# Patient Record
Sex: FEMALE | Race: WHITE | URBAN - METROPOLITAN AREA
[De-identification: names, ages, dates, MRNs, and addresses within clinical notes are randomized per-mention and may not be internally consistent; named-entity substitution may affect disease eponyms.]

---

## 2018-12-23 ENCOUNTER — WALK IN (OUTPATIENT)
Dept: URGENT CARE | Age: 38
End: 2018-12-23

## 2018-12-23 VITALS — HEART RATE: 76 BPM | DIASTOLIC BLOOD PRESSURE: 72 MMHG | TEMPERATURE: 98.7 F | SYSTOLIC BLOOD PRESSURE: 114 MMHG

## 2018-12-23 DIAGNOSIS — H01.001 BLEPHARITIS OF RIGHT UPPER EYELID, UNSPECIFIED TYPE: Primary | ICD-10-CM

## 2018-12-23 PROCEDURE — 99202 OFFICE O/P NEW SF 15 MIN: CPT | Performed by: NURSE PRACTITIONER

## 2018-12-23 RX ORDER — ALBUTEROL SULFATE 90 UG/1
2 AEROSOL, METERED RESPIRATORY (INHALATION) EVERY 4 HOURS PRN
COMMUNITY

## 2018-12-23 RX ORDER — MONTELUKAST SODIUM 10 MG/1
10 TABLET ORAL NIGHTLY
COMMUNITY

## 2018-12-23 RX ORDER — ALBUTEROL SULFATE 1.25 MG/3ML
1.25 SOLUTION RESPIRATORY (INHALATION) EVERY 6 HOURS PRN
COMMUNITY

## 2018-12-23 RX ORDER — TOBRAMYCIN 3 MG/ML
1 SOLUTION/ DROPS OPHTHALMIC 4 TIMES DAILY
Qty: 5 ML | Refills: 0 | Status: SHIPPED | OUTPATIENT
Start: 2018-12-23 | End: 2018-12-30

## 2018-12-23 RX ORDER — SERTRALINE HYDROCHLORIDE 100 MG/1
100 TABLET, FILM COATED ORAL DAILY
COMMUNITY

## 2018-12-23 ASSESSMENT — ENCOUNTER SYMPTOMS
EYE ITCHING: 1
EYE DISCHARGE: 0
FEVER: 0
PHOTOPHOBIA: 0
EYE REDNESS: 0
CHILLS: 0
EYE PAIN: 0

## 2019-10-05 ENCOUNTER — HOSPITAL (OUTPATIENT)
Dept: OTHER | Age: 39
End: 2019-10-05

## 2021-01-30 ENCOUNTER — IMMUNIZATION (OUTPATIENT)
Dept: LAB | Facility: HOSPITAL | Age: 41
End: 2021-01-30
Attending: EMERGENCY MEDICINE
Payer: COMMERCIAL

## 2021-01-30 DIAGNOSIS — Z23 NEED FOR VACCINATION: Primary | ICD-10-CM

## 2021-01-30 PROCEDURE — 0011A SARSCOV2 VAC 100MCG/0.5ML IM: CPT

## 2021-02-10 ENCOUNTER — LAB ENCOUNTER (OUTPATIENT)
Dept: LAB | Age: 41
End: 2021-02-10
Attending: ALLERGY & IMMUNOLOGY
Payer: COMMERCIAL

## 2021-02-10 ENCOUNTER — NURSE ONLY (OUTPATIENT)
Dept: ALLERGY | Facility: CLINIC | Age: 41
End: 2021-02-10
Payer: COMMERCIAL

## 2021-02-10 ENCOUNTER — OFFICE VISIT (OUTPATIENT)
Dept: ALLERGY | Facility: CLINIC | Age: 41
End: 2021-02-10
Payer: COMMERCIAL

## 2021-02-10 VITALS
OXYGEN SATURATION: 99 % | HEART RATE: 74 BPM | BODY MASS INDEX: 30.31 KG/M2 | WEIGHT: 200 LBS | SYSTOLIC BLOOD PRESSURE: 117 MMHG | HEIGHT: 68 IN | DIASTOLIC BLOOD PRESSURE: 76 MMHG

## 2021-02-10 DIAGNOSIS — J45.40 MODERATE PERSISTENT EXTRINSIC ASTHMA WITHOUT COMPLICATION: Primary | ICD-10-CM

## 2021-02-10 DIAGNOSIS — J30.89 PERENNIAL ALLERGIC RHINITIS WITH SEASONAL VARIATION: ICD-10-CM

## 2021-02-10 DIAGNOSIS — J30.2 PERENNIAL ALLERGIC RHINITIS WITH SEASONAL VARIATION: ICD-10-CM

## 2021-02-10 DIAGNOSIS — J45.40 MODERATE PERSISTENT EXTRINSIC ASTHMA WITHOUT COMPLICATION: ICD-10-CM

## 2021-02-10 LAB
BASOPHILS # BLD AUTO: 0.03 X10(3) UL (ref 0–0.2)
BASOPHILS NFR BLD AUTO: 0.5 %
DEPRECATED RDW RBC AUTO: 40.4 FL (ref 35.1–46.3)
EOSINOPHIL # BLD AUTO: 0.12 X10(3) UL (ref 0–0.7)
EOSINOPHIL NFR BLD AUTO: 2 %
ERYTHROCYTE [DISTWIDTH] IN BLOOD BY AUTOMATED COUNT: 13 % (ref 11–15)
HCT VFR BLD AUTO: 43.8 %
HGB BLD-MCNC: 14.3 G/DL
IMM GRANULOCYTES # BLD AUTO: 0 X10(3) UL (ref 0–1)
IMM GRANULOCYTES NFR BLD: 0 %
LYMPHOCYTES # BLD AUTO: 2.07 X10(3) UL (ref 1–4)
LYMPHOCYTES NFR BLD AUTO: 34.7 %
MCH RBC QN AUTO: 28.1 PG (ref 26–34)
MCHC RBC AUTO-ENTMCNC: 32.6 G/DL (ref 31–37)
MCV RBC AUTO: 86.2 FL
MONOCYTES # BLD AUTO: 0.34 X10(3) UL (ref 0.1–1)
MONOCYTES NFR BLD AUTO: 5.7 %
NEUTROPHILS # BLD AUTO: 3.41 X10 (3) UL (ref 1.5–7.7)
NEUTROPHILS # BLD AUTO: 3.41 X10(3) UL (ref 1.5–7.7)
NEUTROPHILS NFR BLD AUTO: 57.1 %
PLATELET # BLD AUTO: 192 10(3)UL (ref 150–450)
RBC # BLD AUTO: 5.08 X10(6)UL
WBC # BLD AUTO: 6 X10(3) UL (ref 4–11)

## 2021-02-10 PROCEDURE — 3008F BODY MASS INDEX DOCD: CPT | Performed by: ALLERGY & IMMUNOLOGY

## 2021-02-10 PROCEDURE — 95024 IQ TESTS W/ALLERGENIC XTRCS: CPT | Performed by: ALLERGY & IMMUNOLOGY

## 2021-02-10 PROCEDURE — 36415 COLL VENOUS BLD VENIPUNCTURE: CPT

## 2021-02-10 PROCEDURE — 85025 COMPLETE CBC W/AUTO DIFF WBC: CPT

## 2021-02-10 PROCEDURE — 99204 OFFICE O/P NEW MOD 45 MIN: CPT | Performed by: ALLERGY & IMMUNOLOGY

## 2021-02-10 PROCEDURE — 95004 PERQ TESTS W/ALRGNC XTRCS: CPT | Performed by: ALLERGY & IMMUNOLOGY

## 2021-02-10 PROCEDURE — 82785 ASSAY OF IGE: CPT

## 2021-02-10 PROCEDURE — 3078F DIAST BP <80 MM HG: CPT | Performed by: ALLERGY & IMMUNOLOGY

## 2021-02-10 PROCEDURE — 3074F SYST BP LT 130 MM HG: CPT | Performed by: ALLERGY & IMMUNOLOGY

## 2021-02-10 RX ORDER — AMITRIPTYLINE HYDROCHLORIDE 50 MG/1
TABLET, FILM COATED ORAL
COMMUNITY
End: 2021-04-15

## 2021-02-10 RX ORDER — METHYLPREDNISOLONE 4 MG/1
TABLET ORAL
COMMUNITY
Start: 2020-10-09 | End: 2021-04-01

## 2021-02-10 RX ORDER — SUMATRIPTAN 50 MG/1
TABLET, FILM COATED ORAL
COMMUNITY
Start: 2018-12-05 | End: 2021-09-08

## 2021-02-10 RX ORDER — SERTRALINE HYDROCHLORIDE 100 MG/1
100 TABLET, FILM COATED ORAL
COMMUNITY
Start: 2018-04-23 | End: 2021-03-29

## 2021-02-10 RX ORDER — FLUTICASONE FUROATE AND VILANTEROL TRIFENATATE 100; 25 UG/1; UG/1
POWDER RESPIRATORY (INHALATION) DAILY
COMMUNITY
End: 2021-02-10

## 2021-02-10 RX ORDER — CETIRIZINE HCL/PSEUDOEPHEDRINE 5 MG-120MG
TABLET, EXTENDED RELEASE 12 HR ORAL
COMMUNITY
Start: 2020-11-17

## 2021-02-10 RX ORDER — MONTELUKAST SODIUM 10 MG/1
10 TABLET ORAL
COMMUNITY
Start: 2018-03-17 | End: 2021-02-10

## 2021-02-10 RX ORDER — PREDNISONE 20 MG/1
TABLET ORAL
COMMUNITY
Start: 2020-10-05 | End: 2021-04-01

## 2021-02-10 RX ORDER — MULTIVITAMIN WITH FOLIC ACID 400 MCG
TABLET ORAL
COMMUNITY

## 2021-02-10 RX ORDER — LORATADINE 10 MG/1
TABLET ORAL
COMMUNITY
End: 2021-10-04 | Stop reason: ALTCHOICE

## 2021-02-10 RX ORDER — BUDESONIDE 0.5 MG/2ML
INHALANT ORAL
COMMUNITY
Start: 2020-11-19 | End: 2022-01-28 | Stop reason: ALTCHOICE

## 2021-02-10 RX ORDER — TIOTROPIUM BROMIDE INHALATION SPRAY 3.12 UG/1
2 SPRAY, METERED RESPIRATORY (INHALATION) DAILY
Qty: 1 INHALER | Refills: 5 | Status: SHIPPED | OUTPATIENT
Start: 2021-02-10 | End: 2021-09-09

## 2021-02-10 RX ORDER — PREDNISONE 10 MG/1
TABLET ORAL
COMMUNITY
Start: 2020-10-22 | End: 2021-04-01

## 2021-02-10 RX ORDER — ALBUTEROL SULFATE 2.5 MG/3ML
SOLUTION RESPIRATORY (INHALATION)
COMMUNITY
Start: 2020-11-29

## 2021-02-10 RX ORDER — ALBUTEROL SULFATE 90 UG/1
2 AEROSOL, METERED RESPIRATORY (INHALATION)
COMMUNITY
Start: 2018-03-04 | End: 2022-01-28

## 2021-02-10 RX ORDER — MONTELUKAST SODIUM 10 MG/1
10 TABLET ORAL NIGHTLY
Qty: 30 TABLET | Refills: 5 | Status: SHIPPED | OUTPATIENT
Start: 2021-02-10 | End: 2021-10-04 | Stop reason: ALTCHOICE

## 2021-02-10 RX ORDER — AZITHROMYCIN 250 MG/1
TABLET, FILM COATED ORAL
COMMUNITY
Start: 2020-10-22 | End: 2021-04-01

## 2021-02-10 NOTE — PATIENT INSTRUCTIONS
1. Asthma  Moderate persistent by history. Denies active or persistent symptoms more than 2 days/week over the past month . 1 prior hospitalization In 2012. No history of intubations.   Flu vaccine and Pneumovax up-to-date  Continue with Alvesco 160 1 puf

## 2021-02-10 NOTE — PROGRESS NOTES
Kalpesh Ledbetter is a 41year old female. HPI:   Patient presents with:   Allergies: patient presents for environmental allergies, possible mold and dust, ragweed, has had allergies for 30 years, has sinuss pressure, itchy watery eyes, nasal congestio Smoking status: Never Smoker      Smokeless tobacco: Never Used    Alcohol use: Yes      Frequency: Monthly or less    Drug use: Never       Medications (Active prior to today's visit):  Current Outpatient Medications   Medication Sig Dispense Refill   • Allergic/Immuno:  See HPI  Cardiovascular:  Negative for irregular heartbeat/palpitations, chest pain, edema  Constitutional:  Negative night sweats,weight loss, irritability and lethargy  Endocrine:  Negative for cold intolerance, polydipsia and polyp was + to rw, weeds, mold, dog     1. Asthma  Moderate persistent by history. Denies active or persistent symptoms more than 2 days/week over the past month . 1 prior hospitalization In 2012. No history of intubations.   Flu vaccine and Pneumovax up-to-roscoe administration of these medications. Teaching, instruction and sample was provided to the patient by myself. Teaching included  a review of potential adverse side effects as well as potential efficacy.   Patient's questions were answered in regards to med

## 2021-02-18 ENCOUNTER — HOSPITAL ENCOUNTER (OUTPATIENT)
Age: 41
Discharge: HOME | End: 2021-02-18
Payer: COMMERCIAL

## 2021-02-18 ENCOUNTER — APPOINTMENT (OUTPATIENT)
Dept: GENERAL RADIOLOGY | Age: 41
End: 2021-02-18
Attending: NURSE PRACTITIONER
Payer: COMMERCIAL

## 2021-02-18 VITALS
RESPIRATION RATE: 18 BRPM | OXYGEN SATURATION: 97 % | HEART RATE: 83 BPM | SYSTOLIC BLOOD PRESSURE: 128 MMHG | DIASTOLIC BLOOD PRESSURE: 72 MMHG | TEMPERATURE: 99 F

## 2021-02-18 DIAGNOSIS — M25.561 ACUTE PAIN OF RIGHT KNEE: Primary | ICD-10-CM

## 2021-02-18 PROCEDURE — 99203 OFFICE O/P NEW LOW 30 MIN: CPT | Performed by: NURSE PRACTITIONER

## 2021-02-18 PROCEDURE — 99213 OFFICE O/P EST LOW 20 MIN: CPT | Performed by: NURSE PRACTITIONER

## 2021-02-18 PROCEDURE — 73560 X-RAY EXAM OF KNEE 1 OR 2: CPT | Performed by: NURSE PRACTITIONER

## 2021-02-18 NOTE — ED INITIAL ASSESSMENT (HPI)
Gradual onset of right knee pain getting worse over past 2 weeks. No known trauma. Pain is worse walking up stairs. Pain located just below patella. + distal CMS.

## 2021-02-18 NOTE — ED PROVIDER NOTES
Patient Seen in: [facility]      History   Patient presents with:  Leg or Foot Injury: Right Knee Pain - Entered by patient  Knee Pain    Stated Complaint: Leg or Foot Injury - Right Knee Pain    HPI/Subjective:   HPI    This is a well-appear external ear normal.      Nose: Nose normal.      Mouth/Throat:      Mouth: Mucous membranes are moist.      Pharynx: Oropharynx is clear. Uvula midline. Eyes:      General: Lids are normal.      Extraocular Movements: Extraocular movements intact. the patient the need for close follow-up. Orthopedics contact information on her discharge paperwork. I have placed her in a knee immobilizer. We discussed supportive care and close follow-up. Strict ER precautions given.   Patient verbalized plan of ca

## 2021-02-24 ENCOUNTER — OFFICE VISIT (OUTPATIENT)
Dept: ORTHOPEDICS CLINIC | Facility: CLINIC | Age: 41
End: 2021-02-24
Payer: COMMERCIAL

## 2021-02-24 ENCOUNTER — HOSPITAL ENCOUNTER (OUTPATIENT)
Dept: GENERAL RADIOLOGY | Facility: HOSPITAL | Age: 41
Discharge: HOME | End: 2021-02-24
Attending: ORTHOPAEDIC SURGERY
Payer: COMMERCIAL

## 2021-02-24 VITALS — HEIGHT: 68.31 IN | BODY MASS INDEX: 29.66 KG/M2 | WEIGHT: 198 LBS

## 2021-02-24 DIAGNOSIS — R52 PAIN: ICD-10-CM

## 2021-02-24 DIAGNOSIS — M22.41 PATELLOFEMORAL CHONDROSIS OF RIGHT KNEE: Primary | ICD-10-CM

## 2021-02-24 PROCEDURE — 3008F BODY MASS INDEX DOCD: CPT | Performed by: ORTHOPAEDIC SURGERY

## 2021-02-24 PROCEDURE — 99204 OFFICE O/P NEW MOD 45 MIN: CPT | Performed by: ORTHOPAEDIC SURGERY

## 2021-02-24 PROCEDURE — 73560 X-RAY EXAM OF KNEE 1 OR 2: CPT | Performed by: ORTHOPAEDIC SURGERY

## 2021-02-24 NOTE — PROGRESS NOTES
NURSING INTAKE COMMENTS: Patient presents with:  Knee Pain: Right - onset a couple of weeks ago - no injury - has pain more with the stairs - rates pain as 3-8/10 on and off - has x-rays in the system - has little swelling, no  numbness or tingling       H MCG/ACT Inhalation Aerosol Inhale into the lungs. • predniSONE 10 MG Oral Tab TK 2 TS PO D FOR 4 DAYS THEN TK 1 T PO D FOR 4 DAYS     • predniSONE 20 MG Oral Tab TK 2 TS PO D FOR 4 DAYS     • Sertraline HCl 100 MG Oral Tab Take 100 mg by mouth.      • S dizziness, memory loss  PSYCHIATRIC: denies Hx of depression, anxiety, other psychiatric disorders  HEMATOLOGIC: denies blood clots, anemia, blood clotting disorders, blood transfusion  ENDOCRINE: denies autoimmune disease, thyroid issues, or diabetes  ALL 2/24/2021 at 4:24 PM     Finalized by (CST): Snuil Napoles MD on 2/24/2021 at 4:25 PM          Xr Knee (1 Or 2 Views), Right (cpt=73560)    Result Date: 2/18/2021  PROCEDURE: XR KNEE (1 OR 2 VIEWS), RIGHT (CPT=73560)  COMPARISON: None.   INDICATIONS: An

## 2021-02-27 ENCOUNTER — IMMUNIZATION (OUTPATIENT)
Dept: LAB | Facility: HOSPITAL | Age: 41
End: 2021-02-27
Attending: PREVENTIVE MEDICINE
Payer: COMMERCIAL

## 2021-02-27 DIAGNOSIS — Z23 NEED FOR VACCINATION: Primary | ICD-10-CM

## 2021-02-27 PROCEDURE — 0012A SARSCOV2 VAC 100MCG/0.5ML IM: CPT

## 2021-03-16 ENCOUNTER — TELEPHONE (OUTPATIENT)
Dept: PHYSICAL THERAPY | Facility: HOSPITAL | Age: 41
End: 2021-03-16

## 2021-03-17 ENCOUNTER — OFFICE VISIT (OUTPATIENT)
Dept: PHYSICAL THERAPY | Facility: HOSPITAL | Age: 41
End: 2021-03-17
Attending: ORTHOPAEDIC SURGERY
Payer: COMMERCIAL

## 2021-03-17 DIAGNOSIS — M22.41 PATELLOFEMORAL CHONDROSIS OF RIGHT KNEE: ICD-10-CM

## 2021-03-17 PROCEDURE — 97530 THERAPEUTIC ACTIVITIES: CPT

## 2021-03-17 PROCEDURE — 97110 THERAPEUTIC EXERCISES: CPT

## 2021-03-17 PROCEDURE — 97161 PT EVAL LOW COMPLEX 20 MIN: CPT

## 2021-03-17 NOTE — PROGRESS NOTES
LOWER EXTREMITY EVALUATION:   Referring Physician: Dr. Jami Baker  Diagnosis: R knee pain, L posterolateral thigh pain, LBP     Date of Service: 3/17/2021     PATIENT SUMMARY   Lars Schroeder is a 41year old female who presents to therapy today with repetitively, worse at end of     Exercise/Hobbies/Recreation: chasing around 4yo      Sleep screen: slow adjustment, used to work overnights, getting 6-8 hours all in one.       Past Medical History:   Diagnosis Date   • Allergic rhinitis    • Asthma Furo-Formoterol Fum 100-5 MCG/ACT Inhalation Aerosol, Inhale into the lungs. , Disp: , Rfl:   predniSONE 10 MG Oral Tab, TK 2 TS PO D FOR 4 DAYS THEN TK 1 T PO D FOR 4 DAYS, Disp: , Rfl:   predniSONE 20 MG Oral Tab, TK 2 TS PO D FOR 4 DAYS, Disp: , Rfl:   S impairments and reach functional goals. Precautions:  None  OBJECTIVE:     Palpation: L lateral TFJ space, L GT bursae, ITB, gerdy.   R all WFL    LROM  TBD    Accessory Lumbar T12-S1 progressively more groin pain R as PT goes lower  AROM: (* denotes pe provided recommendations for activity modifications, possible soreness after evaluation, modalities as needed [ice/heat], postural corrections, ergonomics, pain science education , detrimental fear avoidance behaviors and importance of remaining active.   P be seen for 1-2 x/week or a total of 8 visits over a 90 day period.  Treatment will include: Gait training, Manual Therapy, Mechanical Traction, Neuromuscular Re-education, Self-Care Home Management, Therapeutic Activities, Therapeutic Exercise and Home Exe

## 2021-03-23 ENCOUNTER — OFFICE VISIT (OUTPATIENT)
Dept: PHYSICAL THERAPY | Facility: HOSPITAL | Age: 41
End: 2021-03-23
Attending: ORTHOPAEDIC SURGERY
Payer: COMMERCIAL

## 2021-03-23 PROCEDURE — 97112 NEUROMUSCULAR REEDUCATION: CPT

## 2021-03-23 PROCEDURE — 97110 THERAPEUTIC EXERCISES: CPT

## 2021-03-23 PROCEDURE — 97530 THERAPEUTIC ACTIVITIES: CPT

## 2021-03-23 NOTE — PROGRESS NOTES
PHYSICAL THERAPY DAILY TREATMENT NOTE  Phyllis Gramajo  41year old  female  Diagnosis: R knee pain, L posterolateral thigh pain, LBP      Insurance (Authorized # of Visits):      Vinita Melchor 73 Physician: Dr. Jesus Fraser -    Palpation: L lateral TFJ space, L GT bursae, ITB, gerdy.   R all WFL    Accessory Lumbar T12-S1 progressively more groin pain R as PT goes lower   •  •  •  •  •    NV=next visit, AMRAP= as many reps as possible, RPE= rate of perceived exertion, LLLD= l

## 2021-03-29 ENCOUNTER — PATIENT MESSAGE (OUTPATIENT)
Dept: OBGYN CLINIC | Facility: CLINIC | Age: 41
End: 2021-03-29

## 2021-03-29 ENCOUNTER — OFFICE VISIT (OUTPATIENT)
Dept: OBGYN CLINIC | Facility: CLINIC | Age: 41
End: 2021-03-29
Payer: COMMERCIAL

## 2021-03-29 VITALS
WEIGHT: 195.81 LBS | HEIGHT: 68 IN | HEART RATE: 96 BPM | SYSTOLIC BLOOD PRESSURE: 120 MMHG | DIASTOLIC BLOOD PRESSURE: 80 MMHG | BODY MASS INDEX: 29.68 KG/M2

## 2021-03-29 DIAGNOSIS — Z01.419 ENCOUNTER FOR ANNUAL ROUTINE GYNECOLOGICAL EXAMINATION: Primary | ICD-10-CM

## 2021-03-29 DIAGNOSIS — Z12.31 ENCOUNTER FOR SCREENING MAMMOGRAM FOR BREAST CANCER: ICD-10-CM

## 2021-03-29 DIAGNOSIS — Z98.890 HISTORY OF LOOP ELECTRICAL EXCISION PROCEDURE (LEEP): ICD-10-CM

## 2021-03-29 DIAGNOSIS — Z12.4 SCREENING FOR MALIGNANT NEOPLASM OF CERVIX: ICD-10-CM

## 2021-03-29 DIAGNOSIS — Z13.21 ENCOUNTER FOR VITAMIN DEFICIENCY SCREENING: ICD-10-CM

## 2021-03-29 PROBLEM — O14.23 HEMOLYSIS, ELEVATED LIVER ENZYMES, AND LOW PLATELET (HELLP) SYNDROME DURING PREGNANCY IN THIRD TRIMESTER: Status: ACTIVE | Noted: 2021-03-29

## 2021-03-29 PROBLEM — O14.23 HEMOLYSIS, ELEVATED LIVER ENZYMES, AND LOW PLATELET (HELLP) SYNDROME DURING PREGNANCY IN THIRD TRIMESTER (HCC): Status: ACTIVE | Noted: 2021-03-29

## 2021-03-29 PROBLEM — N80.9 ENDOMETRIOSIS: Status: ACTIVE | Noted: 2021-03-29

## 2021-03-29 PROCEDURE — 99386 PREV VISIT NEW AGE 40-64: CPT | Performed by: ADVANCED PRACTICE MIDWIFE

## 2021-03-29 PROCEDURE — 3074F SYST BP LT 130 MM HG: CPT | Performed by: ADVANCED PRACTICE MIDWIFE

## 2021-03-29 PROCEDURE — 3079F DIAST BP 80-89 MM HG: CPT | Performed by: ADVANCED PRACTICE MIDWIFE

## 2021-03-29 PROCEDURE — 3008F BODY MASS INDEX DOCD: CPT | Performed by: ADVANCED PRACTICE MIDWIFE

## 2021-03-29 NOTE — PROGRESS NOTES
HPI/Subjective:   Patient ID: Jessika Decker is a 41year old female. HPI    History/Other:   Review of Systems   Constitutional: Negative. HENT: Negative. Eyes: Negative. Respiratory: Negative. Cardiovascular: Negative.     Gastrointest SUMAtriptan Succinate 50 MG Oral Tab TK 1 T PO BID PRN     • Ciclesonide 160 MCG/ACT Inhalation Aero Soln Inhale 2 puffs into the lungs daily.  1 Inhaler 5   • Tiotropium Bromide Monohydrate (SPIRIVA RESPIMAT) 2.5 MCG/ACT Inhalation Aero Soln Inhale 2 puffs MG Oral Tab daily     • azithromycin 250 MG Oral Tab TK 2 TS PO FOR 1 DAY THEN TK 1 T PO D FOR 4 DAYS     • budesonide 0.5 MG/2ML Inhalation Suspension INHALE 1 VIAL PO VIA NEBULIZER BID     • ZYRTEC-D ALLERGY & CONGESTION 5-120 MG Oral Tablet 12 Hr TK 1 T Tenderness: There is no abdominal tenderness. Genitourinary:     General: Normal vulva. Vagina: Normal.      Adnexa:         Right: No mass, tenderness or fullness. Left: No mass, tenderness or fullness.         Rectum: Normal.      Comments

## 2021-03-30 ENCOUNTER — OFFICE VISIT (OUTPATIENT)
Dept: PHYSICAL THERAPY | Facility: HOSPITAL | Age: 41
End: 2021-03-30
Attending: ORTHOPAEDIC SURGERY
Payer: COMMERCIAL

## 2021-03-30 ENCOUNTER — LAB ENCOUNTER (OUTPATIENT)
Dept: LAB | Facility: HOSPITAL | Age: 41
End: 2021-03-30
Attending: ADVANCED PRACTICE MIDWIFE
Payer: COMMERCIAL

## 2021-03-30 DIAGNOSIS — Z01.419 ENCOUNTER FOR ANNUAL ROUTINE GYNECOLOGICAL EXAMINATION: ICD-10-CM

## 2021-03-30 DIAGNOSIS — Z13.21 ENCOUNTER FOR VITAMIN DEFICIENCY SCREENING: ICD-10-CM

## 2021-03-30 PROCEDURE — 82306 VITAMIN D 25 HYDROXY: CPT

## 2021-03-30 PROCEDURE — 36415 COLL VENOUS BLD VENIPUNCTURE: CPT

## 2021-03-30 PROCEDURE — 84443 ASSAY THYROID STIM HORMONE: CPT

## 2021-03-30 PROCEDURE — 97530 THERAPEUTIC ACTIVITIES: CPT

## 2021-03-30 PROCEDURE — 80053 COMPREHEN METABOLIC PANEL: CPT

## 2021-03-30 PROCEDURE — 97110 THERAPEUTIC EXERCISES: CPT

## 2021-03-30 PROCEDURE — 97112 NEUROMUSCULAR REEDUCATION: CPT

## 2021-03-30 PROCEDURE — 80061 LIPID PANEL: CPT

## 2021-03-30 NOTE — PROGRESS NOTES
PHYSICAL THERAPY DAILY TREATMENT NOTE  Britney Connell  41year old  female  Diagnosis: R knee pain, L posterolateral thigh pain, LBP      Insurance (Authorized # of Visits):      Meera Melchor 73 Physician: Dr. Waldo Curling 4+/5*    Flexibility:  Jorge test - Hip Flexor: R +, L +  Supine 90-90 - Hamstrings: R +; L +  Piriformis: R -; L -  Quads: R +; L +  Gastroc-soleus: R -; L -    Palpation: L lateral TFJ space, L GT bursae, ITB, gerdy.   R all WFL    Accessory Lumbar T12-S

## 2021-04-01 ENCOUNTER — MOBILE ENCOUNTER (OUTPATIENT)
Dept: OBGYN CLINIC | Facility: CLINIC | Age: 41
End: 2021-04-01

## 2021-04-01 ENCOUNTER — OFFICE VISIT (OUTPATIENT)
Dept: INTERNAL MEDICINE CLINIC | Facility: CLINIC | Age: 41
End: 2021-04-01
Payer: COMMERCIAL

## 2021-04-01 ENCOUNTER — HOSPITAL ENCOUNTER (OUTPATIENT)
Dept: MAMMOGRAPHY | Facility: HOSPITAL | Age: 41
Discharge: HOME | End: 2021-04-01
Attending: ADVANCED PRACTICE MIDWIFE
Payer: COMMERCIAL

## 2021-04-01 VITALS
HEIGHT: 68 IN | HEART RATE: 85 BPM | WEIGHT: 197 LBS | DIASTOLIC BLOOD PRESSURE: 74 MMHG | RESPIRATION RATE: 17 BRPM | BODY MASS INDEX: 29.86 KG/M2 | SYSTOLIC BLOOD PRESSURE: 109 MMHG

## 2021-04-01 DIAGNOSIS — Z12.31 ENCOUNTER FOR SCREENING MAMMOGRAM FOR BREAST CANCER: ICD-10-CM

## 2021-04-01 DIAGNOSIS — E55.9 VITAMIN D DEFICIENCY: ICD-10-CM

## 2021-04-01 DIAGNOSIS — Z00.00 PHYSICAL EXAM, ANNUAL: Primary | ICD-10-CM

## 2021-04-01 PROCEDURE — 3008F BODY MASS INDEX DOCD: CPT | Performed by: INTERNAL MEDICINE

## 2021-04-01 PROCEDURE — 77063 BREAST TOMOSYNTHESIS BI: CPT | Performed by: ADVANCED PRACTICE MIDWIFE

## 2021-04-01 PROCEDURE — 3078F DIAST BP <80 MM HG: CPT | Performed by: INTERNAL MEDICINE

## 2021-04-01 PROCEDURE — 77067 SCR MAMMO BI INCL CAD: CPT | Performed by: ADVANCED PRACTICE MIDWIFE

## 2021-04-01 PROCEDURE — 99386 PREV VISIT NEW AGE 40-64: CPT | Performed by: INTERNAL MEDICINE

## 2021-04-01 PROCEDURE — 3074F SYST BP LT 130 MM HG: CPT | Performed by: INTERNAL MEDICINE

## 2021-04-01 RX ORDER — CHOLECALCIFEROL (VITAMIN D3) 1250 MCG
1 CAPSULE ORAL WEEKLY
Qty: 8 CAPSULE | Refills: 0 | Status: SHIPPED | OUTPATIENT
Start: 2021-04-01 | End: 2021-05-31

## 2021-04-01 NOTE — PATIENT INSTRUCTIONS
Prevention Guidelines, Women Ages 36 to 52  Screening tests and vaccines are an important part of managing your health. A screening test is done to find diseases in people who don't have any symptoms.  The goal is to find a disease early so lifestyle brown sigmoidoscopy every 5 years, or  · Colonoscopy every 10 years, or  · CT colonography (virtual colonoscopy) every 5 years, or  · Yearly fecal occult blood test, or  · Yearly fecal immunochemical test every year, or  · Stool DNA test, every 3 years  If you c least 4 weeks after the first dose   Hepatitis A Women at increased risk for infection–talk with your healthcare provider 2 doses given 6 months apart   Hepatitis B Women at increased risk for infection–talk with your healthcare provider 3 doses over 6 mon American Academy of Ophthalmology  Domenico last reviewed this educational content on 11/1/2017  © 9092-9556 [employer]. All rights reserved. This information is not intended as a substitute for professional medical care.  Always follow your

## 2021-04-01 NOTE — PROGRESS NOTES
Funmi Julio is a 41year old female.   Patient presents with:  Physical      HPI:   New pt   C/c establish care   C/o  Physical   Overall doing well , had a hysterectomy for endometriosis and heavy periods etc         PMH  Allergic rhinitis/seasonal Laterality Date   • Appendectomy     •      •  delivery only     • Cholecystectomy  2016   • Hysterectomy  2015   • [facility], ,,    ablation of implants      Social History:  Social History    Tobacc advised     will take vit d otc as per the gyn recs , will monitor     Preventive medicine  Pap smear done 3/20/2021 results pending  Labs done  Mammogram 4/1/2021  Albs from 2/2021 and 3/2021        The patient indicates understanding of these issues and

## 2021-04-02 ENCOUNTER — APPOINTMENT (OUTPATIENT)
Dept: PHYSICAL THERAPY | Facility: HOSPITAL | Age: 41
End: 2021-04-02
Attending: ORTHOPAEDIC SURGERY
Payer: COMMERCIAL

## 2021-04-07 ENCOUNTER — OFFICE VISIT (OUTPATIENT)
Dept: PHYSICAL THERAPY | Facility: HOSPITAL | Age: 41
End: 2021-04-07
Attending: ORTHOPAEDIC SURGERY
Payer: COMMERCIAL

## 2021-04-07 PROCEDURE — 97140 MANUAL THERAPY 1/> REGIONS: CPT

## 2021-04-07 PROCEDURE — 97112 NEUROMUSCULAR REEDUCATION: CPT

## 2021-04-07 PROCEDURE — 97110 THERAPEUTIC EXERCISES: CPT

## 2021-04-07 NOTE — PROGRESS NOTES
PHYSICAL THERAPY DAILY TREATMENT NOTE  Donna Seen  41year old  female  Diagnosis: R knee pain, L posterolateral thigh pain, LBP      Insurance (Authorized # of Visits):      Alem Melchor 73 Physician: Dr. Narda Yanez sitting, bending/stooping over, lifting.   •  •  •  •  •    Objective comparable signs • decr squat  • MMT Hip F 4+ R*, Ext 4+ B, R knee ext 4+/5*    Flexibility:  Jorge test - Hip Flexor: R +, L +  Supine 90-90 - Hamstrings: R +; L +  Piriformis: R -; L - Total Timed Treatment: 45 min  Total Treatment Time: 45 min

## 2021-04-09 ENCOUNTER — APPOINTMENT (OUTPATIENT)
Dept: PHYSICAL THERAPY | Facility: HOSPITAL | Age: 41
End: 2021-04-09
Attending: ORTHOPAEDIC SURGERY
Payer: COMMERCIAL

## 2021-04-14 ENCOUNTER — OFFICE VISIT (OUTPATIENT)
Dept: PHYSICAL THERAPY | Facility: HOSPITAL | Age: 41
End: 2021-04-14
Attending: ORTHOPAEDIC SURGERY
Payer: COMMERCIAL

## 2021-04-14 ENCOUNTER — OFFICE VISIT (OUTPATIENT)
Dept: PODIATRY CLINIC | Facility: CLINIC | Age: 41
End: 2021-04-14
Payer: COMMERCIAL

## 2021-04-14 DIAGNOSIS — B35.1 ONYCHOMYCOSIS: Primary | ICD-10-CM

## 2021-04-14 PROCEDURE — 97110 THERAPEUTIC EXERCISES: CPT

## 2021-04-14 PROCEDURE — 99202 OFFICE O/P NEW SF 15 MIN: CPT | Performed by: PODIATRIST

## 2021-04-14 PROCEDURE — 97530 THERAPEUTIC ACTIVITIES: CPT

## 2021-04-14 NOTE — PROGRESS NOTES
HPI:    Patient ID: Donna Seen is a 41year old female. This pleasant 43-year-old female presents as a new patient to me and is self-referred. 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   The reason she is here is because of the nails par Aero Soln Inhale 2 puffs into the lungs daily. 1 Inhaler 5   • Tiotropium Bromide Monohydrate (SPIRIVA RESPIMAT) 2.5 MCG/ACT Inhalation Aero Soln Inhale 2 puffs into the lungs daily.  1 Inhaler 5   • Montelukast Sodium 10 MG Oral Tab Take 1 tablet (10 mg to

## 2021-04-14 NOTE — PROGRESS NOTES
PHYSICAL THERAPY DAILY TREATMENT NOTE  Jocy Moncada  41year old  female  Diagnosis: R knee pain, L posterolateral thigh pain, LBP      Insurance (Authorized # of Visits):      Kate Melchor 73 Physician: Dr. Nikki Rogers 2x8 ea • SL skater squat rtb 3x10-15 ea • Retro tap gtb 2x10 ea 6\"  Hip abd facil  • Lunge 2x10 static gtb hip abd facil ea •  •    Subjective Comparable signs stair negotiation, Sit <>stand, working (lots of repetitive sit <> stand), prolonged sitting, b over position  All: laying       PLAN OF CARE:    Short Term Goals: (to be met in 86 visits)  · Pt will be independent and compliant with comprehensive HEP to maintain progress achieved in PT   · Pt will improve quad strength to 5/5 to ascend 1 flight of s

## 2021-04-15 ENCOUNTER — OFFICE VISIT (OUTPATIENT)
Dept: ORTHOPEDICS CLINIC | Facility: CLINIC | Age: 41
End: 2021-04-15
Payer: COMMERCIAL

## 2021-04-15 VITALS — WEIGHT: 198 LBS | BODY MASS INDEX: 30.01 KG/M2 | HEIGHT: 68 IN

## 2021-04-15 DIAGNOSIS — M22.41 PATELLOFEMORAL CHONDROSIS OF RIGHT KNEE: Primary | ICD-10-CM

## 2021-04-15 DIAGNOSIS — Z78.9 KNOWN HEALTH PROBLEMS: NONE: Primary | ICD-10-CM

## 2021-04-15 PROCEDURE — 3008F BODY MASS INDEX DOCD: CPT | Performed by: ORTHOPAEDIC SURGERY

## 2021-04-15 PROCEDURE — 99213 OFFICE O/P EST LOW 20 MIN: CPT | Performed by: ORTHOPAEDIC SURGERY

## 2021-04-15 NOTE — PROGRESS NOTES
NURSING INTAKE COMMENTS: Patient presents with:  Knee Pain: right knee, a little pinchy, not in any pain. Pain worsens as day goes on. PT did help. HPI: This 41year old female presents today with complaints of right knee follow-up.   She has patellof lungs.     • SUMAtriptan Succinate 50 MG Oral Tab TK 1 T PO BID PRN     • Ciclesonide 160 MCG/ACT Inhalation Aero Soln Inhale 2 puffs into the lungs daily.  1 Inhaler 5   • Tiotropium Bromide Monohydrate (SPIRIVA RESPIMAT) 2.5 MCG/ACT Inhalation Aero Soln I denies numbness, tingling, weakness, balance issues, dizziness, memory loss  PSYCHIATRIC: denies Hx of depression, anxiety, other psychiatric disorders  HEMATOLOGIC: denies blood clots, anemia, blood clotting disorders, blood transfusion  ENDOCRINE: denies for breast cancer  TECHNIQUE:  Full field direct screening mammography was performed and images were reviewed with the R2 RAI [de-identified] CAD device.   3D tomosynthesis was performed and reviewed   BREAST COMPOSITION:   Category c-Heterogeneously dense, which m exercises for isometric strengthening of the VMO. Gradually increase activity. Follow-up on an as-needed basis. All of her questions were asked and answered. Follow Up: No follow-ups on file.     BRITTANEY Stubbs Dr. was present for th

## 2021-05-12 ENCOUNTER — APPOINTMENT (OUTPATIENT)
Dept: PHYSICAL THERAPY | Facility: HOSPITAL | Age: 41
End: 2021-05-12
Attending: ORTHOPAEDIC SURGERY
Payer: COMMERCIAL

## 2021-05-18 ENCOUNTER — OFFICE VISIT (OUTPATIENT)
Dept: PODIATRY CLINIC | Facility: CLINIC | Age: 41
End: 2021-05-18
Payer: COMMERCIAL

## 2021-05-18 DIAGNOSIS — B35.1 DERMATOPHYTOSIS OF NAIL: Primary | ICD-10-CM

## 2021-05-18 PROCEDURE — 99212 OFFICE O/P EST SF 10 MIN: CPT | Performed by: PODIATRIST

## 2021-05-18 NOTE — PROGRESS NOTES
HPI:    Patient ID: Emperatriz Pederson is a 41year old female. This 15-year-old female presents for nail culture. The indication is that she has discontinued all local antifungal treatments for the past month.   After discussion and review of options FACE FLUSHING, HIVES, RASH,                            SWELLING   PHYSICAL EXAM:              ASSESSMENT/PLAN:   Dermatophytosis of nail  (primary encounter diagnosis)    Orders Placed This Encounter      Dermatophyte Only, Culture [E]      Meds This [employer]

## 2021-05-26 ENCOUNTER — OFFICE VISIT (OUTPATIENT)
Dept: PHYSICAL THERAPY | Facility: HOSPITAL | Age: 41
End: 2021-05-26
Attending: ORTHOPAEDIC SURGERY
Payer: COMMERCIAL

## 2021-05-26 PROCEDURE — 97112 NEUROMUSCULAR REEDUCATION: CPT

## 2021-05-26 PROCEDURE — 97530 THERAPEUTIC ACTIVITIES: CPT

## 2021-05-26 NOTE — PROGRESS NOTES
PHYSICAL THERAPY DAILY TREATMENT NOTE  Mecca Watts  41year old  female  Diagnosis: R knee pain, L posterolateral thigh pain, LBP      Insurance (Authorized # of Visits):      Sherri Melchor 73 Physician: Dr. Sujey Ng bracing  • 10'   Manual Therapy •  •  •  • TFJ post mob L Gr 3  • Lumbar L L5 Gr 3-4, x1 Gr 5 rotation •  •    Neuromuscular Re-education •  • Squat 3x10 rtb  • Standing hip abd 3x10 ea rtb  • Y balance anterior slide 2x8 ea • SL skater squat rtb 3x10-15 e more activity    L constant posterolateral thigh pain (since she fell in a septic tank) 2018 May  Aggs: depends on how she is sitting (just depends on chair), in the car, pressure into posterior thigh  All: standing     LBP  Agg: bending, lifting, bent ove

## 2021-06-15 ENCOUNTER — PATIENT MESSAGE (OUTPATIENT)
Dept: INTERNAL MEDICINE CLINIC | Facility: CLINIC | Age: 41
End: 2021-06-15

## 2021-06-15 DIAGNOSIS — R10.31 RIGHT INGUINAL PAIN: Primary | ICD-10-CM

## 2021-06-15 NOTE — TELEPHONE ENCOUNTER
Reviewed PT note from [facility], PT from 5/26/21 in chart. Pt reported having right inguinal pain. Please see patient's report below.  Requesting referral for pelvic floor PT. LOV for you 4/1/21 for physical. Per message seems she has history of abdomi

## 2021-06-15 NOTE — TELEPHONE ENCOUNTER
From: Nathen Lomeli  To: America Mayer MD  Sent: 6/15/2021 11:53 AM CDT  Subject: Referral Request    Hi Dr Holger Moseley,     I was wondering if you could write me a referral to see physical therapist Angelo Salcido.  Last month when I seen Philipp Oro for for my 1 mo

## 2021-06-18 ENCOUNTER — TELEPHONE (OUTPATIENT)
Dept: PHYSICAL THERAPY | Facility: HOSPITAL | Age: 41
End: 2021-06-18

## 2021-07-14 ENCOUNTER — TELEPHONE (OUTPATIENT)
Dept: ALLERGY | Facility: CLINIC | Age: 41
End: 2021-07-14

## 2021-07-14 NOTE — TELEPHONE ENCOUNTER
Per cover my meds, no PA required, is covered med. Per [employer] [employer] prescriptions for 90 day supply needs to be filled at pharmacies listed below, not at [place].      Called [facility], [phone], spoke with Arin Duran, inquired which are el

## 2021-07-14 NOTE — TELEPHONE ENCOUNTER
Medication PA Requested:  Tiotropium Bromide Monohydrate (SPIRIVA RESPIMAT) 2.5 MCG/ACT Inhalation Aero Soln, Disp: 1 Inhaler, Rfl: 5                     CoverMyMeds Used:  Key: [id number]  Sig:  Inhale 2 puffs into the lungs daily. ,  DX Code:  Moderate persi

## 2021-07-14 NOTE — TELEPHONE ENCOUNTER
Medication PA requested:  Tiotropium Bromide Monohydrate (SPIRIVA RESPIMAT) 2.5 MCG/ACT Inhalation Aero Soln, Inhale 2 puffs into the lungs daily. , Disp: 1 Inhaler, Rfl: 5    Dx Code:  Moderate persistent extrinsic asthma without complication D56.52      [facility]

## 2021-07-14 NOTE — TELEPHONE ENCOUNTER
•  Tiotropium Bromide Monohydrate (SPIRIVA RESPIMAT) 2.5 MCG/ACT Inhalation Aero Soln, Inhale 2 puffs into the lungs daily. , Disp: 1 Inhaler, Rfl: 5    Key: [id number]

## 2021-07-15 NOTE — TELEPHONE ENCOUNTER
LMTCB on patient's voicemail. When patient returns call please inform her as below and ask what pharmacy that is covered by per insurance plan she would like a prescription sent to for Spiriva Respimat.       Per Angelica, reject states has to be 90day

## 2021-08-05 ENCOUNTER — NURSE ONLY (OUTPATIENT)
Dept: LAB | Age: 41
End: 2021-08-05
Attending: PREVENTIVE MEDICINE
Payer: COMMERCIAL

## 2021-08-05 ENCOUNTER — TELEPHONE (OUTPATIENT)
Dept: INTERNAL MEDICINE CLINIC | Facility: HOSPITAL | Age: 41
End: 2021-08-05

## 2021-08-05 DIAGNOSIS — Z20.822 SUSPECTED COVID-19 VIRUS INFECTION: Primary | ICD-10-CM

## 2021-08-05 DIAGNOSIS — Z20.822 SUSPECTED COVID-19 VIRUS INFECTION: ICD-10-CM

## 2021-08-05 LAB — SARS-COV-2 RNA RESP QL NAA+PROBE: NOT DETECTED

## 2021-08-05 NOTE — TELEPHONE ENCOUNTER
Results and RTW guidelines:    COVID RESULT GIVEN:      Test type:    [x] Rapid         [] Alinity      [x] NEGATIVE     Ordered Alinity retest?  []Yes   [x] No (skip to RTW)  Notes: Needs to be symptom free for 48hrs    RTW PLAN:    [] RTW 10 days with

## 2021-08-05 NOTE — TELEPHONE ENCOUNTER
Department:   [facility]                             [] [address] [facility]  []JAQUI   [x] [address] [facility]    Dept Manager/Supervisor/team or clinical lead: Sarah Vickers    Position:  [] MD     [] RN     [] Respiratory Therapist     [] PCT     [] Other psr    [address] [facility] [phone]  When are you next scheduled to work? 08.05.2021    Did you have close contact with someone on your unit while not wearing a mask? (e.g., during meal breaks):  Yes []   No [x]    If yes, who:   Do you share a workspace?  Yes [x]   No []       If months: Remove from work. Order Alinity testing at least 5 days from exposure. If negative may return to work after 7 days from exposure date (on day 8 after exposure).         [] Asymptomatic AND vaccinated or COVID i

## 2021-08-19 ENCOUNTER — OFFICE VISIT (OUTPATIENT)
Dept: PHYSICAL THERAPY | Facility: HOSPITAL | Age: 41
End: 2021-08-19
Attending: INTERNAL MEDICINE
Payer: COMMERCIAL

## 2021-08-19 DIAGNOSIS — R10.31 RIGHT INGUINAL PAIN: ICD-10-CM

## 2021-08-19 PROCEDURE — 97140 MANUAL THERAPY 1/> REGIONS: CPT

## 2021-08-19 PROCEDURE — 97535 SELF CARE MNGMENT TRAINING: CPT

## 2021-08-19 PROCEDURE — 97162 PT EVAL MOD COMPLEX 30 MIN: CPT

## 2021-08-19 NOTE — PROGRESS NOTES
PELVIC FLOOR EVALUATION:   Referring Physician: Dr. Escobar Ruiz  Diagnosis: Right inguinal pain (R10.31)     Date of Onset: 2014 Date of Service: 8/19/2021     PATIENT SUMMARY   Nathen Lomeli is a 41year old female  who presents to therapy today with stabbing    PFDI 20    Scores   POPDI 6:   0    CRAD 8:   0    KASEY 6:   [id number]    Summary:   [id number]         PMH was discussed/reviewed with patient and pertinent findings are noted below.    Past Medical History:   Diagnosis Date   • All during day and occasionally at night   Pad Change frequency: as needed   Water Intake (oz/day): 40-50  Bladder irritants: soda, coffee     BOWEL HABITS  Types of symptoms: n/a   Frequency of bowel movements: 1-3x/day   [place] Stool Scale: 3, 4  Do you str layer 3. Demonstrates fair PF activation, 1+/5 MMT grade for 3 seconds endurance. Patient would benefit from skilled Physical Therapy to address the above stated impairments in order to return patient to prior level of function.  Patient was instructed in a minimal restriction and tenderness    Ischiocavernosus minimal restriction and tenderness minimal restriction and tenderness    Deep Transverse Perineal WNL minimal restriction and tenderness    Compress Urethrae/Sphincter Urethrovaginalis   WNL minimal re HEP during and upon discharge to aide with symptom management and return to previous level of function. 2. Patient to increase water intake to at least 40-50 oz/day to improve overall health/hydration.      3. Patient to report improved urinary frequency

## 2021-08-26 ENCOUNTER — APPOINTMENT (OUTPATIENT)
Dept: PHYSICAL THERAPY | Facility: HOSPITAL | Age: 41
End: 2021-08-26
Attending: INTERNAL MEDICINE
Payer: COMMERCIAL

## 2021-09-02 ENCOUNTER — OFFICE VISIT (OUTPATIENT)
Dept: PHYSICAL THERAPY | Facility: HOSPITAL | Age: 41
End: 2021-09-02
Attending: INTERNAL MEDICINE
Payer: COMMERCIAL

## 2021-09-02 PROCEDURE — 97535 SELF CARE MNGMENT TRAINING: CPT

## 2021-09-02 PROCEDURE — 97140 MANUAL THERAPY 1/> REGIONS: CPT

## 2021-09-02 NOTE — PROGRESS NOTES
Dx: Right inguinal pain (R10.31)          Authorized # of Visits/Insurance: [facility] PPO  Script Dates: 8/19/21 - 10/15/21           Next MD visit: none scheduled  Referring MD: Oli Philippe  Fall Risk:  standard             Medication Changes since last visit displacement     Today's Treatment   9/2/2021   Visit: 2   Notes/Precautions:    HEP    8/19: Female pelvic floor anatomy, bladder normatives, PF holds/quicks 3x10 3s, x5; diaphragmatic breathing   9/2: bladder inhibition techniques    Warm Up       Stretc

## 2021-09-03 ENCOUNTER — PATIENT MESSAGE (OUTPATIENT)
Dept: INTERNAL MEDICINE CLINIC | Facility: CLINIC | Age: 41
End: 2021-09-03

## 2021-09-04 RX ORDER — SUMATRIPTAN 25 MG/1
25 TABLET, FILM COATED ORAL EVERY 2 HOUR PRN
Qty: 20 TABLET | Refills: 0 | Status: SHIPPED | OUTPATIENT
Start: 2021-09-04 | End: 2021-09-08

## 2021-09-04 NOTE — TELEPHONE ENCOUNTER
Pt's last appt was 4/1/21 for a physical with you, advised appt as migraines were not discussed at that visit.

## 2021-09-04 NOTE — TELEPHONE ENCOUNTER
From: Afia Moscoso  To: Chalino Boyle MD  Sent: 9/3/2021 2:05 PM CDT  Subject: Non-Urgent Medical Question    I know I haven't seen you for dealing with Migraines. It's not that often that I get them or that i get them really bad.  Most of the time I

## 2021-09-04 NOTE — TELEPHONE ENCOUNTER
Noted and agree with advise given --ty   Video visit is ok as well   Stay well hydrated   Avoid etoh , get enough rest /sleep   Noted sumatriptan on prifile -- dd she take this ? Does she need a refill ?

## 2021-09-08 RX ORDER — SUMATRIPTAN 25 MG/1
TABLET, FILM COATED ORAL
Qty: 20 TABLET | Refills: 0 | Status: SHIPPED | OUTPATIENT
Start: 2021-09-08 | End: 2022-01-28

## 2021-09-08 RX ORDER — SUMATRIPTAN 25 MG/1
TABLET, FILM COATED ORAL
Qty: 20 TABLET | Refills: 0 | Status: SHIPPED | OUTPATIENT
Start: 2021-09-08 | End: 2021-09-09

## 2021-09-09 ENCOUNTER — APPOINTMENT (OUTPATIENT)
Dept: PHYSICAL THERAPY | Facility: HOSPITAL | Age: 41
End: 2021-09-09
Attending: INTERNAL MEDICINE
Payer: COMMERCIAL

## 2021-09-09 ENCOUNTER — TELEMEDICINE (OUTPATIENT)
Dept: INTERNAL MEDICINE CLINIC | Facility: CLINIC | Age: 41
End: 2021-09-09
Payer: COMMERCIAL

## 2021-09-09 ENCOUNTER — TELEPHONE (OUTPATIENT)
Dept: INTERNAL MEDICINE CLINIC | Facility: HOSPITAL | Age: 41
End: 2021-09-09

## 2021-09-09 DIAGNOSIS — G43.909 MIGRAINE WITHOUT STATUS MIGRAINOSUS, NOT INTRACTABLE, UNSPECIFIED MIGRAINE TYPE: Primary | ICD-10-CM

## 2021-09-09 DIAGNOSIS — Z20.822 SUSPECTED COVID-19 VIRUS INFECTION: Primary | ICD-10-CM

## 2021-09-09 PROCEDURE — 99213 OFFICE O/P EST LOW 20 MIN: CPT | Performed by: INTERNAL MEDICINE

## 2021-09-09 RX ORDER — ONDANSETRON 4 MG/1
4 TABLET, FILM COATED ORAL 2 TIMES DAILY PRN
Qty: 20 TABLET | Refills: 2 | Status: SHIPPED | OUTPATIENT
Start: 2021-09-09

## 2021-09-09 NOTE — TELEPHONE ENCOUNTER
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 days  When was the last shift you worked? 9/9/2021  When are you next scheduled to work?  9/10/2021    Did you have close contact with someone on your unit while not wearing a mask? (e.g., during meal vaccinated or COVID infection in past 3 months: May work and continue to monitor symptoms for the                                       next 14 days. Test w/ Alinity 3-5 days after exposure.                                                  COVID-19 testing

## 2021-09-09 NOTE — PROGRESS NOTES
Patient ID: Mecca Watts is a 41year old female. Patient presents with:  Migraine       HISTORY OF PRESENT ILLNESS:   Patient presents for above. This visit is conducted using Telemedicine with live, interactive video and audio.   C/C migraine  C/ PRN, Disp: , Rfl:   •  CALCIUM CITRATE-VITAMIN D OR, Take by mouth., Disp: , Rfl:   •  Multiple Vitamin (HIGH POTENCY MULTIVITAMIN) Oral Tab, one tablet daily, Disp: , Rfl:   •  OMEGA-3 FATTY ACIDS OR, Take by mouth., Disp: , Rfl:   •  Psyllium 58.6 % Oral (Non-Medical):   Physical Activity:       Days of Exercise per Week:       Minutes of Exercise per Session:   Stress:       Feeling of Stress :   Social Connections:       Frequency of Communication with Friends and Family:       Frequency of Social Gather continuity of care in the best interest of the provider-patient relationship, due to the COVID -19 public health crisis/national emergency where restrictions of face-to-face office visits are ongoing.  Every conscious effort was taken to allow for sufficien

## 2021-09-10 ENCOUNTER — NURSE ONLY (OUTPATIENT)
Dept: LAB | Age: 41
End: 2021-09-10
Attending: PREVENTIVE MEDICINE
Payer: COMMERCIAL

## 2021-09-10 DIAGNOSIS — Z20.822 SUSPECTED COVID-19 VIRUS INFECTION: ICD-10-CM

## 2021-09-10 LAB — SARS-COV-2 RNA RESP QL NAA+PROBE: DETECTED

## 2021-09-10 NOTE — TELEPHONE ENCOUNTER
Results and RTW guidelines:    COVID RESULT:    [] Viewed by employee in [address] RTW plan and instructions as indicated on triage call. Manager notified. Estimated RTW date:   [x] Discussed with employee   [] Unable to reach by phone.   Sent via [employer] results  INSTRUCTIONS PROVIDED:  [x]  Plan as noted above  []  Length of time to obtain results  []  May return to work if employee views negative result in [address] and remains fever, vomiting, and diarrhea free  [x]  Quarantine instructions  [x]  S/S of w

## 2021-09-16 ENCOUNTER — APPOINTMENT (OUTPATIENT)
Dept: PHYSICAL THERAPY | Facility: HOSPITAL | Age: 41
End: 2021-09-16
Attending: INTERNAL MEDICINE
Payer: COMMERCIAL

## 2021-09-16 ENCOUNTER — TELEPHONE (OUTPATIENT)
Dept: INTERNAL MEDICINE CLINIC | Facility: CLINIC | Age: 41
End: 2021-09-16

## 2021-09-16 ENCOUNTER — PATIENT MESSAGE (OUTPATIENT)
Dept: INTERNAL MEDICINE CLINIC | Facility: CLINIC | Age: 41
End: 2021-09-16

## 2021-09-17 RX ORDER — BENZONATATE 100 MG/1
100 CAPSULE ORAL 2 TIMES DAILY PRN
Qty: 10 CAPSULE | Refills: 0 | Status: SHIPPED | OUTPATIENT
Start: 2021-09-17 | End: 2021-09-18

## 2021-09-17 NOTE — TELEPHONE ENCOUNTER
Spoke with patient ( verified) and relayed Dr. Miles Jacobo message below--patient verbalizes understanding and agreement--PCP did call patient back and spoke with her. No further questions/concerns at this time.

## 2021-09-17 NOTE — TELEPHONE ENCOUNTER
Patient called about [employer] message.  Had telemedicine     States that every time she coughs, she feels pain along her abdominal surgical scars     She has applied the abdominal wrap from her  to hold her abdomen    Has difficulty moving elianu

## 2021-09-17 NOTE — TELEPHONE ENCOUNTER
Tried to call patient–no answer did not leave msg -  message that Yadira Monteiro has been sent to her pharmacy on file was what I wanted to tell her

## 2021-09-17 NOTE — TELEPHONE ENCOUNTER
RN pls call pt and triage or offer virtual ov if needed, thanks. From: Malick sEtes  To:  Kayla Jo MD  Sent: 9/16/2021  6:31 PM CDT  Subject: Abdominal pain    So up until today I was doing fairly decent with Covid.  Today my coughing ki

## 2021-09-17 NOTE — TELEPHONE ENCOUNTER
See TE 9-16-21.     Future Appointments   Date Time Provider Nydia Young   9/23/2021  8:00 AM Zackary London, PT [facility] NEURO PT EM [facility]   9/30/2021  8:00 AM Zackary London, PT [facility] NEURO PT EM [facility]

## 2021-09-17 NOTE — TELEPHONE ENCOUNTER
From: Eligio Bah  To: Erik Griggs MD  Sent: 9/16/2021 6:31 PM CDT  Subject: Abdominal pain    So up until today I was doing fairly decent with Covid. Today my coughing kicked up really bad.  I'm not short of breathe really but there is some sligh

## 2021-09-17 NOTE — TELEPHONE ENCOUNTER
Patient read [employer] message. Will postpone until Monday and will check if patient will call back.        Abdominal pain  Message [phone]  From   Tami Araya RN To   Gómez Bradley and Delivered   9/16/2021 11:07 PM   Last Read in My

## 2021-09-20 ENCOUNTER — TELEPHONE (OUTPATIENT)
Dept: INTERNAL MEDICINE CLINIC | Facility: CLINIC | Age: 41
End: 2021-09-20

## 2021-09-20 DIAGNOSIS — Z86.16 HISTORY OF COVID-19: ICD-10-CM

## 2021-09-20 DIAGNOSIS — R05.9 COUGH: Primary | ICD-10-CM

## 2021-09-20 RX ORDER — GUAIFENESIN AND CODEINE PHOSPHATE 100; 10 MG/5ML; MG/5ML
5 SOLUTION ORAL 2 TIMES DAILY PRN
Qty: 118 ML | Refills: 0 | Status: SHIPPED | OUTPATIENT
Start: 2021-09-20 | End: 2021-09-23

## 2021-09-20 NOTE — TELEPHONE ENCOUNTER
Called and spoke to patient  If not better in the next couple days–chest x-ray ordered and follow-up visit advised  Cough syrup sent to pharmacy on file  Encounter closed

## 2021-09-20 NOTE — TELEPHONE ENCOUNTER
----- Message from Fatmata Zarco sent at 9/20/2021 11:06 AM CDT -----  Regarding: Not cleared by Employee  Health  Hi,  Had my appt with Employee health today and was not cleared.  My cough is still really bad, occasionally  shortness in breath, lopez

## 2021-09-20 NOTE — TELEPHONE ENCOUNTER
Spoke with patient ( verified) RE: MyChart message below:    Patient denies fever, but is experiencing persistent, harsh cough--has tried [employer] without relief--also taking NyQuil at bedtime, but cough not letting her sleep.     Today is day #10

## 2021-09-22 ENCOUNTER — HOSPITAL ENCOUNTER (OUTPATIENT)
Dept: GENERAL RADIOLOGY | Facility: HOSPITAL | Age: 41
Discharge: HOME | End: 2021-09-22
Attending: INTERNAL MEDICINE
Payer: COMMERCIAL

## 2021-09-22 DIAGNOSIS — R05.9 COUGH: ICD-10-CM

## 2021-09-22 DIAGNOSIS — Z86.16 HISTORY OF COVID-19: ICD-10-CM

## 2021-09-22 PROCEDURE — 71046 X-RAY EXAM CHEST 2 VIEWS: CPT | Performed by: INTERNAL MEDICINE

## 2021-09-22 NOTE — TELEPHONE ENCOUNTER
ESTEPHANIAI, patient calling to provide update since last conversation 9/20/21. patient recently diagnosed with Covid19    Patient states she has CXR scheduled for [address]  Still having harsh frequent cough that is aggravated by talking or doing a lot of mov

## 2021-09-23 ENCOUNTER — APPOINTMENT (OUTPATIENT)
Dept: PHYSICAL THERAPY | Facility: HOSPITAL | Age: 41
End: 2021-09-23
Attending: INTERNAL MEDICINE
Payer: COMMERCIAL

## 2021-09-23 ENCOUNTER — VIRTUAL PHONE E/M (OUTPATIENT)
Dept: INTERNAL MEDICINE CLINIC | Facility: CLINIC | Age: 41
End: 2021-09-23
Payer: COMMERCIAL

## 2021-09-23 DIAGNOSIS — Z86.16 HISTORY OF COVID-19: ICD-10-CM

## 2021-09-23 DIAGNOSIS — R05.9 COUGH: Primary | ICD-10-CM

## 2021-09-23 PROCEDURE — 99213 OFFICE O/P EST LOW 20 MIN: CPT | Performed by: INTERNAL MEDICINE

## 2021-09-23 RX ORDER — PREDNISONE 20 MG/1
20 TABLET ORAL DAILY
Qty: 7 TABLET | Refills: 0 | Status: SHIPPED | OUTPATIENT
Start: 2021-09-23 | End: 2021-09-30

## 2021-09-23 RX ORDER — GUAIFENESIN AND CODEINE PHOSPHATE 100; 10 MG/5ML; MG/5ML
5 SOLUTION ORAL 2 TIMES DAILY PRN
Qty: 118 ML | Refills: 0 | Status: SHIPPED | OUTPATIENT
Start: 2021-09-23 | End: 2021-10-04

## 2021-09-23 NOTE — PROGRESS NOTES
Telemedicine Visit for Respiratory Illness - COVID-19 Infection    Virtual/Telephone Check-In    Michelleailyn Meredith verbally consents to a Telephone Check-In service on 09/23/21.  Patient understands and accepts financial responsibility for any deductible, Endometriosis    Current Outpatient Medications   Medication Sig Dispense Refill   • predniSONE 20 MG Oral Tab Take 1 tablet (20 mg total) by mouth daily for 7 days.  7 tablet 0   • guaiFENesin-Codeine 100-10 MG/5ML Oral Syrup Take 5 mg of codeine by mouth check CT if not better  A letter for her work to be off until she feels better    Duration of service, in minutes: 12 min    Follow up: if not better     Please note that the following visit was completed using telephone communications.   This has been done

## 2021-09-24 ENCOUNTER — TELEPHONE (OUTPATIENT)
Dept: INTERNAL MEDICINE CLINIC | Facility: CLINIC | Age: 41
End: 2021-09-24

## 2021-09-24 NOTE — TELEPHONE ENCOUNTER
Disability forms received in forms department, logged for processing, sent pt a [employer] message for missing HIPAA.

## 2021-09-26 ENCOUNTER — TELEPHONE (OUTPATIENT)
Dept: INTERNAL MEDICINE CLINIC | Facility: CLINIC | Age: 41
End: 2021-09-26

## 2021-09-26 DIAGNOSIS — R05.9 COUGH: ICD-10-CM

## 2021-09-26 RX ORDER — CODEINE PHOSPHATE AND GUAIFENESIN 10; 100 MG/5ML; MG/5ML
SOLUTION ORAL
Qty: 180 ML | Refills: 0 | OUTPATIENT
Start: 2021-09-26

## 2021-09-26 NOTE — TELEPHONE ENCOUNTER
Patient called requesting refill on guaifenesin with codeine, states that insurance will not cover for a while could not get medication as she has been taking 10 mL instead of 5 mL it works better for her requested to increase the dose.   Sent prescription

## 2021-09-27 ENCOUNTER — TELEPHONE (OUTPATIENT)
Dept: INTERNAL MEDICINE CLINIC | Facility: CLINIC | Age: 41
End: 2021-09-27

## 2021-09-27 ENCOUNTER — APPOINTMENT (OUTPATIENT)
Dept: GENERAL RADIOLOGY | Facility: HOSPITAL | Age: 41
End: 2021-09-27
Payer: COMMERCIAL

## 2021-09-27 ENCOUNTER — HOSPITAL ENCOUNTER (EMERGENCY)
Facility: HOSPITAL | Age: 41
Discharge: HOME | End: 2021-09-27
Payer: COMMERCIAL

## 2021-09-27 VITALS
OXYGEN SATURATION: 97 % | RESPIRATION RATE: 20 BRPM | TEMPERATURE: 98 F | HEART RATE: 71 BPM | SYSTOLIC BLOOD PRESSURE: 116 MMHG | DIASTOLIC BLOOD PRESSURE: 72 MMHG

## 2021-09-27 DIAGNOSIS — R05.9 COUGH: ICD-10-CM

## 2021-09-27 DIAGNOSIS — R07.89 CHEST PAIN, MUSCULOSKELETAL: Primary | ICD-10-CM

## 2021-09-27 PROCEDURE — 71046 X-RAY EXAM CHEST 2 VIEWS: CPT

## 2021-09-27 PROCEDURE — 93005 ELECTROCARDIOGRAM TRACING: CPT

## 2021-09-27 PROCEDURE — 99284 EMERGENCY DEPT VISIT MOD MDM: CPT

## 2021-09-27 PROCEDURE — 93010 ELECTROCARDIOGRAM REPORT: CPT | Performed by: INTERNAL MEDICINE

## 2021-09-27 RX ORDER — HYDROCODONE BITARTRATE AND ACETAMINOPHEN 5; 325 MG/1; MG/1
1 TABLET ORAL EVERY 6 HOURS PRN
Qty: 15 TABLET | Refills: 0 | Status: SHIPPED | OUTPATIENT
Start: 2021-09-27 | End: 2021-10-04

## 2021-09-27 RX ORDER — HYDROCODONE BITARTRATE AND ACETAMINOPHEN 5; 325 MG/1; MG/1
1 TABLET ORAL EVERY 6 HOURS PRN
Qty: 15 TABLET | Refills: 0 | Status: SHIPPED | OUTPATIENT
Start: 2021-09-27 | End: 2021-10-11

## 2021-09-27 RX ORDER — HYDROCODONE BITARTRATE AND ACETAMINOPHEN 5; 325 MG/1; MG/1
2 TABLET ORAL ONCE
Status: COMPLETED | OUTPATIENT
Start: 2021-09-27 | End: 2021-09-27

## 2021-09-27 NOTE — TELEPHONE ENCOUNTER
Current Outpatient Medications:    VIRTUSSIN EVERETT SY (SF&ALCOHOL FREE)AS NEEDED TAKE 2.5 MLBY MOUTH TWICE A DAY

## 2021-09-27 NOTE — ED QUICK NOTES
PATIENT APPROVED FOR DISCHARGE PER ER PROVIDER. PATIENT GIVEN VERBAL AND WRITTEN DISCHARGE INSTRUCTIONS. GIVEN INSTRUCTIONS ON RX X 1, FOLLOW UP WITH PCP AND SYMPTOMS THAT NECESSITATE COMING BACK TO ED. VERBALIZES UNDERSTANDING OF DISCHARGE INSTRUCTIONS.

## 2021-09-27 NOTE — TELEPHONE ENCOUNTER
Disp Refills Start End     guaiFENesin-Codeine 100-10 MG/5ML Oral Syrup 180 mL 0 9/26/2021     Sig: Take 10 ml po every 12 hours a s needed for cough    Sent to pharmacy as: guaiFENesin-Codeine 100-10 MG/5ML Oral Syrup    E-Prescribing Status: Receipt conf

## 2021-09-27 NOTE — ED INITIAL ASSESSMENT (HPI)
Nasreen Sol was diagnosed with COVID-19 on 9/10. She developed midsternal chest pain radiating to back one week later.

## 2021-09-28 NOTE — ED PROVIDER NOTES
Patient Seen in: [facility]    History   Patient presents with:  Chest Pain Angina  Cough/URI      HPI    Patient presents to the ED after being diagnosed with COVID-19 on 9/10.   She states that she felt okay for the first wee Male      ROS  Pertinent positives: Chest and back pain, cough  All other organ systems are reviewed and are negative. Constitutional and vital signs reviewed.       Social History and Family History elements reviewed from today, pertinent positives to t Musculoskeletal:         General: No deformity. Skin:     General: Skin is warm and dry. Neurological:      Mental Status: She is alert and oriented to person, place, and time.    Psychiatric:         Mood and Affect: Mood normal.         Behavior: Be PE clinically and normal EKG. Patient reassured and stable for discharge with pain control and outpatient follow-up. Additional verbal instructions and return precautions were discussed with the patient and/or caregiver.       Condition upon leaving the

## 2021-09-29 ENCOUNTER — TELEPHONE (OUTPATIENT)
Dept: INTERNAL MEDICINE CLINIC | Facility: CLINIC | Age: 41
End: 2021-09-29

## 2021-09-29 NOTE — TELEPHONE ENCOUNTER
Patient calling to schedule an ER f/u visit. Patient scheduled for 10/4/21. She is fully vaccinated. Covid posititive 9/10/2021. Gave patient number to call [phone] when she arrives for her appointment.    Advised to call back for worsening of symptoms

## 2021-09-30 ENCOUNTER — HOSPITAL ENCOUNTER (OUTPATIENT)
Dept: CT IMAGING | Facility: HOSPITAL | Age: 41
Discharge: HOME | End: 2021-09-30
Attending: INTERNAL MEDICINE
Payer: COMMERCIAL

## 2021-09-30 ENCOUNTER — APPOINTMENT (OUTPATIENT)
Dept: PHYSICAL THERAPY | Facility: HOSPITAL | Age: 41
End: 2021-09-30
Attending: INTERNAL MEDICINE
Payer: COMMERCIAL

## 2021-09-30 DIAGNOSIS — R05.9 COUGH: ICD-10-CM

## 2021-09-30 DIAGNOSIS — R05.3 CHRONIC COUGH: Primary | ICD-10-CM

## 2021-09-30 DIAGNOSIS — Z86.16 HISTORY OF COVID-19: ICD-10-CM

## 2021-09-30 PROCEDURE — 74160 CT ABDOMEN W/CONTRAST: CPT | Performed by: INTERNAL MEDICINE

## 2021-09-30 PROCEDURE — 82565 ASSAY OF CREATININE: CPT

## 2021-09-30 PROCEDURE — 71260 CT THORAX DX C+: CPT | Performed by: INTERNAL MEDICINE

## 2021-09-30 RX ORDER — FLUTICASONE FUROATE AND VILANTEROL TRIFENATATE 100; 25 UG/1; UG/1
1 POWDER RESPIRATORY (INHALATION) DAILY
Qty: 1 EACH | Refills: 0 | Status: SHIPPED | OUTPATIENT
Start: 2021-09-30 | End: 2021-10-19

## 2021-10-01 ENCOUNTER — TELEPHONE (OUTPATIENT)
Dept: INTERNAL MEDICINE CLINIC | Facility: CLINIC | Age: 41
End: 2021-10-01

## 2021-10-01 NOTE — TELEPHONE ENCOUNTER
Disability forms received in forms department, possible duplicate, logged for processing, [employer] message for missing HIPAA already sent.

## 2021-10-04 ENCOUNTER — OFFICE VISIT (OUTPATIENT)
Dept: INTERNAL MEDICINE CLINIC | Facility: CLINIC | Age: 41
End: 2021-10-04
Payer: COMMERCIAL

## 2021-10-04 VITALS
HEART RATE: 96 BPM | DIASTOLIC BLOOD PRESSURE: 70 MMHG | OXYGEN SATURATION: 96 % | WEIGHT: 196 LBS | HEIGHT: 68 IN | BODY MASS INDEX: 29.7 KG/M2 | SYSTOLIC BLOOD PRESSURE: 114 MMHG | TEMPERATURE: 98 F

## 2021-10-04 DIAGNOSIS — Z86.16 HISTORY OF COVID-19: ICD-10-CM

## 2021-10-04 DIAGNOSIS — R05.8 POST-VIRAL COUGH SYNDROME: Primary | ICD-10-CM

## 2021-10-04 DIAGNOSIS — R07.9 CHEST PAIN, UNSPECIFIED TYPE: ICD-10-CM

## 2021-10-04 DIAGNOSIS — R10.9 RIGHT SIDED ABDOMINAL PAIN: ICD-10-CM

## 2021-10-04 PROCEDURE — 3078F DIAST BP <80 MM HG: CPT | Performed by: INTERNAL MEDICINE

## 2021-10-04 PROCEDURE — 99214 OFFICE O/P EST MOD 30 MIN: CPT | Performed by: INTERNAL MEDICINE

## 2021-10-04 PROCEDURE — 3074F SYST BP LT 130 MM HG: CPT | Performed by: INTERNAL MEDICINE

## 2021-10-04 PROCEDURE — 3008F BODY MASS INDEX DOCD: CPT | Performed by: INTERNAL MEDICINE

## 2021-10-04 RX ORDER — CYCLOBENZAPRINE HCL 10 MG
10 TABLET ORAL DAILY PRN
Qty: 20 TABLET | Refills: 1 | Status: SHIPPED | OUTPATIENT
Start: 2021-10-04 | End: 2021-10-19 | Stop reason: ALTCHOICE

## 2021-10-04 RX ORDER — FLUTICASONE PROPIONATE 50 MCG
2 SPRAY, SUSPENSION (ML) NASAL DAILY
Qty: 1 EACH | Refills: 0 | Status: SHIPPED | OUTPATIENT
Start: 2021-10-04 | End: 2021-10-05

## 2021-10-04 RX ORDER — HYDROCODONE BITARTRATE AND ACETAMINOPHEN 5; 325 MG/1; MG/1
1 TABLET ORAL
Qty: 20 TABLET | Refills: 0 | Status: SHIPPED | OUTPATIENT
Start: 2021-10-04 | End: 2022-01-28

## 2021-10-04 NOTE — TELEPHONE ENCOUNTER
Dr. Michael Jett,     Please sign off on form: Disability   -Highlight the patient and hit \"Chart\" button.   -In Chart Review, w/in the Encounter tab - click 1 time on the Telephone call encounter for 10/4/21 Scroll down the telephone encounter.  -Click Bogdan Earl

## 2021-10-04 NOTE — TELEPHONE ENCOUNTER
Disab completed and faxed to [address] [place], [place] [address] [phone].  Sent pt [employer] message

## 2021-10-04 NOTE — PROGRESS NOTES
Bj Guzman is a 41year old female.   Patient presents with:  ER F/U      HPI:   Pt comes for f/u  C/c cough , h/o covid   C/o breo is helping finally   No help with Tessalon Perles with the cough syrup with codeine helps a little  Rate under here total) by mouth 2 (two) times daily as needed for Nausea.  20 tablet 2   • SUMATRIPTAN SUCCINATE 25 MG Oral Tab TAKE 1 TABLET(25 MG) BY MOUTH EVERY 2 HOURS AS NEEDED 20 tablet 0   • albuterol sulfate (2.5 MG/3ML) 0.083% Inhalation Nebu Soln INHALE 1 VIAL PO depression    EXAM:   /70   Pulse 96   Temp 98.4 °F (36.9 °C) (Tympanic)   Ht 5' 8\" (1.727 m)   Wt 196 lb (88.9 kg)   SpO2 96%   BMI 29.80 kg/m²   GENERAL: well developed, well nourished,in no apparent distress, pt coughing throughout the exam-dry h pending  Labs done  Mammogram 4/1/2021  Albs from 2/2021 and 3/2021            The patient indicates understanding of these issues and agrees to the plan. No follow-ups on file.

## 2021-10-04 NOTE — TELEPHONE ENCOUNTER
Dr. Barrett,     Please sign off on form: Disability   -Highlight the patient and hit \"Chart\" button.   -In Chart Review, w/in the Encounter tab - click 1 time on the Telephone call encounter for 9/24/21 Scroll down the telephone encounter.  -Click Robin Milton

## 2021-10-05 DIAGNOSIS — R05.8 POST-VIRAL COUGH SYNDROME: ICD-10-CM

## 2021-10-05 RX ORDER — FLUTICASONE PROPIONATE 50 MCG
2 SPRAY, SUSPENSION (ML) NASAL DAILY
Qty: 3 EACH | Refills: 0 | Status: SHIPPED | OUTPATIENT
Start: 2021-10-05

## 2021-10-05 NOTE — TELEPHONE ENCOUNTER
Please review as was prescribed yesterday and per pharmacy note--requesting 90-day Rx instead.    Requested Prescriptions   Pending Prescriptions Disp Refills    FLUTICASONE PROPIONATE 50 MCG/ACT Nasal Suspension [Pharmacy Med Name: FLUTICASONE 50MCG NASAL

## 2021-10-11 ENCOUNTER — OFFICE VISIT (OUTPATIENT)
Dept: INTERNAL MEDICINE CLINIC | Facility: CLINIC | Age: 41
End: 2021-10-11
Payer: COMMERCIAL

## 2021-10-11 ENCOUNTER — TELEPHONE (OUTPATIENT)
Dept: PULMONOLOGY | Facility: CLINIC | Age: 41
End: 2021-10-11

## 2021-10-11 VITALS
DIASTOLIC BLOOD PRESSURE: 70 MMHG | BODY MASS INDEX: 29.7 KG/M2 | HEART RATE: 96 BPM | HEIGHT: 68 IN | SYSTOLIC BLOOD PRESSURE: 106 MMHG | WEIGHT: 196 LBS | OXYGEN SATURATION: 98 %

## 2021-10-11 DIAGNOSIS — J45.909 MODERATE ASTHMA WITHOUT COMPLICATION, UNSPECIFIED WHETHER PERSISTENT: ICD-10-CM

## 2021-10-11 DIAGNOSIS — R05.3 CHRONIC COUGH: ICD-10-CM

## 2021-10-11 DIAGNOSIS — Z86.16 HISTORY OF COVID-19: Primary | ICD-10-CM

## 2021-10-11 DIAGNOSIS — R05.8 POST-VIRAL COUGH SYNDROME: ICD-10-CM

## 2021-10-11 PROCEDURE — 3008F BODY MASS INDEX DOCD: CPT | Performed by: INTERNAL MEDICINE

## 2021-10-11 PROCEDURE — 3074F SYST BP LT 130 MM HG: CPT | Performed by: INTERNAL MEDICINE

## 2021-10-11 PROCEDURE — 99214 OFFICE O/P EST MOD 30 MIN: CPT | Performed by: INTERNAL MEDICINE

## 2021-10-11 PROCEDURE — 3078F DIAST BP <80 MM HG: CPT | Performed by: INTERNAL MEDICINE

## 2021-10-11 RX ORDER — DIAZEPAM 2 MG/1
2 TABLET ORAL DAILY
Qty: 10 TABLET | Refills: 0 | Status: SHIPPED | OUTPATIENT
Start: 2021-10-11 | End: 2021-10-19 | Stop reason: ALTCHOICE

## 2021-10-11 RX ORDER — MONTELUKAST SODIUM 10 MG/1
10 TABLET ORAL DAILY
Qty: 90 TABLET | Refills: 3 | Status: SHIPPED | OUTPATIENT
Start: 2021-10-11 | End: 2022-01-28

## 2021-10-11 RX ORDER — CODEINE PHOSPHATE AND GUAIFENESIN 10; 100 MG/5ML; MG/5ML
5 SOLUTION ORAL NIGHTLY PRN
Qty: 118 ML | Refills: 0 | Status: SHIPPED | OUTPATIENT
Start: 2021-10-11 | End: 2021-10-19 | Stop reason: ALTCHOICE

## 2021-10-11 NOTE — PROGRESS NOTES
Britney Connell is a 41year old female. Patient presents with:   Follow - Up: cough       HPI:   Pt comes for f/u  C/c chronic cough   C/o           HISTORY   Sternum isnt constantly hurting , now its more in the left lower back and goes to sternum VIA NEBULIZER Q 4 H PRN     • CALCIUM CITRATE-VITAMIN D OR Take by mouth. • Multiple Vitamin (HIGH POTENCY MULTIVITAMIN) Oral Tab one tablet daily     • OMEGA-3 FATTY ACIDS OR Take by mouth. • Psyllium 58.6 % Oral Powder Take by mouth.      • Albute depression    EXAM:   /70   Pulse 96   Ht 5' 8\" (1.727 m)   Wt 196 lb (88.9 kg)   SpO2 98%   BMI 29.80 kg/m²   GENERAL: well developed, well nourished,in no apparent distress BUT HARD BARKING COUGH THROUGHOUT THE EPISODE   SKIN: no rashes,no suspici relaxant to be used cautiously and aware of the side effects due to her severe pain and hopefully this will help reduce the need for Norco but it makes her very sleepy  Advised her to take Colace twice a day and she is on the Norco and causing constipation

## 2021-10-11 NOTE — TELEPHONE ENCOUNTER
Incoming call from Dr. Bret Spurling, patient was seen by PCP with history of COVID, has lingering cough. Per Dr. Bret Spurling, ok to add on to schedule on Thursday 10/14/21.   Spoke with patient consult appointment with Dr. Bret Spurling scheduled on 10/14/21 at 1:15pm.  Miriam Frialanna

## 2021-10-14 ENCOUNTER — LAB ENCOUNTER (OUTPATIENT)
Dept: LAB | Facility: HOSPITAL | Age: 41
End: 2021-10-14
Attending: INTERNAL MEDICINE
Payer: COMMERCIAL

## 2021-10-14 ENCOUNTER — OFFICE VISIT (OUTPATIENT)
Dept: PULMONOLOGY | Facility: CLINIC | Age: 41
End: 2021-10-14
Payer: COMMERCIAL

## 2021-10-14 VITALS
HEIGHT: 68 IN | RESPIRATION RATE: 14 BRPM | OXYGEN SATURATION: 99 % | WEIGHT: 196.81 LBS | HEART RATE: 88 BPM | TEMPERATURE: 99 F | BODY MASS INDEX: 29.83 KG/M2 | DIASTOLIC BLOOD PRESSURE: 77 MMHG | SYSTOLIC BLOOD PRESSURE: 118 MMHG

## 2021-10-14 DIAGNOSIS — R05.9 COUGH: Primary | ICD-10-CM

## 2021-10-14 DIAGNOSIS — R06.00 DYSPNEA, UNSPECIFIED TYPE: ICD-10-CM

## 2021-10-14 PROCEDURE — 99243 OFF/OP CNSLTJ NEW/EST LOW 30: CPT | Performed by: INTERNAL MEDICINE

## 2021-10-14 PROCEDURE — 36415 COLL VENOUS BLD VENIPUNCTURE: CPT

## 2021-10-14 PROCEDURE — 3008F BODY MASS INDEX DOCD: CPT | Performed by: INTERNAL MEDICINE

## 2021-10-14 PROCEDURE — 3074F SYST BP LT 130 MM HG: CPT | Performed by: INTERNAL MEDICINE

## 2021-10-14 PROCEDURE — 85379 FIBRIN DEGRADATION QUANT: CPT

## 2021-10-14 PROCEDURE — 3078F DIAST BP <80 MM HG: CPT | Performed by: INTERNAL MEDICINE

## 2021-10-14 RX ORDER — HYDROCODONE POLISTIREX AND CHLORPHENIRAMINE POLISTIREX 10; 8 MG/5ML; MG/5ML
5 SUSPENSION, EXTENDED RELEASE ORAL 2 TIMES DAILY PRN
Qty: 140 ML | Refills: 0 | Status: SHIPPED | OUTPATIENT
Start: 2021-10-14 | End: 2021-10-28

## 2021-10-14 NOTE — PROGRESS NOTES
Dear  Thelma Gonsales  :           As you know, Anthony Bourgeois is a 52-year-old female who I am now evaluating for cough.        HISTORY OF PRESENT ILLNESS: The patient has a longstanding history of asthma which began in her middle school years and then improved through high No depression currently but depression in the past. No thyroid disease. No lymphatic system concerns. No rash currently although eczema in the past. Muscles and joints unremarkable. Weight fluctuates.     PHYSICAL EXAMINATION: Physical Examination:  Vital month and give me a buzz in a week with an update.   Interestingly, years ago her cough symptomatology had improved in response to danazol making me question whether or not the long-term asthmatic syndrome is somehow related to the endometriosis as has occu

## 2021-10-18 ENCOUNTER — OFFICE VISIT (OUTPATIENT)
Dept: NEUROLOGY | Facility: CLINIC | Age: 41
End: 2021-10-18
Payer: COMMERCIAL

## 2021-10-18 ENCOUNTER — LAB ENCOUNTER (OUTPATIENT)
Dept: LAB | Age: 41
End: 2021-10-18
Attending: Other
Payer: COMMERCIAL

## 2021-10-18 VITALS — BODY MASS INDEX: 30.01 KG/M2 | WEIGHT: 198 LBS | HEIGHT: 68 IN

## 2021-10-18 DIAGNOSIS — U09.9 LONG COVID: Primary | ICD-10-CM

## 2021-10-18 DIAGNOSIS — U09.9 LONG COVID: ICD-10-CM

## 2021-10-18 PROCEDURE — 86256 FLUORESCENT ANTIBODY TITER: CPT

## 2021-10-18 PROCEDURE — 83516 IMMUNOASSAY NONANTIBODY: CPT

## 2021-10-18 PROCEDURE — 82784 ASSAY IGA/IGD/IGG/IGM EACH: CPT

## 2021-10-18 PROCEDURE — 85025 COMPLETE CBC W/AUTO DIFF WBC: CPT | Performed by: OTHER

## 2021-10-18 PROCEDURE — 99203 OFFICE O/P NEW LOW 30 MIN: CPT | Performed by: OTHER

## 2021-10-18 PROCEDURE — 86780 TREPONEMA PALLIDUM: CPT

## 2021-10-18 PROCEDURE — 36415 COLL VENOUS BLD VENIPUNCTURE: CPT

## 2021-10-18 PROCEDURE — 83090 ASSAY OF HOMOCYSTEINE: CPT

## 2021-10-18 PROCEDURE — 3008F BODY MASS INDEX DOCD: CPT | Performed by: OTHER

## 2021-10-18 PROCEDURE — 82607 VITAMIN B-12: CPT | Performed by: OTHER

## 2021-10-18 NOTE — PROGRESS NOTES
Neurology Initial Visit     Referred By: Dr. Michael Jett    Chief Complaint: Patient presents with:  Cough: Referredby Dr. Michael Jett- cough and tightness of chest since having covid in September.  Pt has seen Dr. Siria Crespo as well who gave her Chlorpheniramine / Kevin Valdovinos Maternal Cousin Female 37         Current Outpatient Medications:   •  fluticasone furoate-vilanterol (BREO ELLIPTA) 200-25 MCG/INH Inhalation Aerosol Powder, Breath Activated, Inhale 1 puff into the lungs daily. , Disp: 1 each, Rfl: 5  •  Hydrocod Polst-CP Tablet 12 Hr, TK 1 T PO 1 TO 2 TIMES A DAY, Disp: , Rfl:   •  fluticasone furoate-vilanterol (BREO ELLIPTA) 100-25 MCG/INH Inhalation Aerosol Powder, Breath Activated, Inhale 1 puff into the lungs daily.  (Patient not taking: Reported on 10/18/2021), Disp: symmetric  Ankle jerk 2+ bilateral symmetric    No clonus  No Babinski sign    Coordination:  Finger to nose intact  Rapid alternating movements intact    Gait:  Normal posture  Normal physiologic      Labs:    Lab Results   Component Value Date    TSH 1.1

## 2021-10-19 ENCOUNTER — OFFICE VISIT (OUTPATIENT)
Dept: INTERNAL MEDICINE CLINIC | Facility: CLINIC | Age: 41
End: 2021-10-19
Payer: COMMERCIAL

## 2021-10-19 VITALS
HEIGHT: 68 IN | WEIGHT: 195 LBS | RESPIRATION RATE: 18 BRPM | BODY MASS INDEX: 29.55 KG/M2 | OXYGEN SATURATION: 98 % | HEART RATE: 114 BPM | SYSTOLIC BLOOD PRESSURE: 127 MMHG | DIASTOLIC BLOOD PRESSURE: 81 MMHG

## 2021-10-19 DIAGNOSIS — R05.9 COUGH: Primary | ICD-10-CM

## 2021-10-19 DIAGNOSIS — Z86.16 HISTORY OF COVID-19: ICD-10-CM

## 2021-10-19 PROCEDURE — 3074F SYST BP LT 130 MM HG: CPT | Performed by: INTERNAL MEDICINE

## 2021-10-19 PROCEDURE — 99213 OFFICE O/P EST LOW 20 MIN: CPT | Performed by: INTERNAL MEDICINE

## 2021-10-19 PROCEDURE — 3008F BODY MASS INDEX DOCD: CPT | Performed by: INTERNAL MEDICINE

## 2021-10-19 PROCEDURE — 3079F DIAST BP 80-89 MM HG: CPT | Performed by: INTERNAL MEDICINE

## 2021-10-19 NOTE — PROGRESS NOTES
Yaneli Henderson is a 41year old female.   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

## 2021-10-21 ENCOUNTER — TELEPHONE (OUTPATIENT)
Dept: INTERNAL MEDICINE CLINIC | Facility: CLINIC | Age: 41
End: 2021-10-21

## 2021-10-21 NOTE — TELEPHONE ENCOUNTER
Forms received by fax today. Not sure if they are duplicate but sending to Forms Dept.  PROGRESS NOTES

## 2021-10-22 ENCOUNTER — TELEPHONE (OUTPATIENT)
Dept: PULMONOLOGY | Facility: CLINIC | Age: 41
End: 2021-10-22

## 2021-10-22 NOTE — TELEPHONE ENCOUNTER
Spoke with patient. Patient wanted to give Dr. Pau Felder an update. Patient states cough is better, chest pain a lot easier, using cough med as needed, patient using Breo and is helping. Patient has been having constipation/nausea from taking Norco- has been taking stool softener and zofran. PCP aware of constipation/nausea.

## 2021-10-22 NOTE — TELEPHONE ENCOUNTER
Patient requesting to speak with RN to update Dr Raffy Cheema how she is doing with her treatment. Please call. Thank you.

## 2021-10-22 NOTE — TELEPHONE ENCOUNTER
Disability forms received in forms department, logged for processing, valid [employer] consent on file.

## 2021-10-28 ENCOUNTER — TELEPHONE (OUTPATIENT)
Dept: INTERNAL MEDICINE CLINIC | Facility: CLINIC | Age: 41
End: 2021-10-28

## 2021-10-28 NOTE — TELEPHONE ENCOUNTER
Patient calling, confirmed name and . Covid positive 9/10.  Med hx: Asthma    She states she was instructed by Dr. Michael Jett to call with a condition update:     She states she was feeling better over the weekend but now her sinus pressure has become worse

## 2021-10-28 NOTE — TELEPHONE ENCOUNTER
----- Message from Jennifer Hobson sent at 10/28/2021  1:31 PM CDT -----  Regarding: Poss sinus infection   Hi,  So my cough had been getting so much better. But I feel like I have now started with a sinus infection.  I've started with alot of post nasa

## 2021-10-29 RX ORDER — AMOXICILLIN AND CLAVULANATE POTASSIUM 875; 125 MG/1; MG/1
1 TABLET, FILM COATED ORAL 2 TIMES DAILY
Qty: 20 TABLET | Refills: 0 | Status: SHIPPED | OUTPATIENT
Start: 2021-10-29 | End: 2021-11-08

## 2021-11-01 NOTE — TELEPHONE ENCOUNTER
Patient agreed to try sitz bath. Reports she feels ok to return to work by tomorrow if provider agrees. Requesting work note clearing to return to work. Letter pended review.

## 2021-11-01 NOTE — TELEPHONE ENCOUNTER
Spoke to patient. She said she was instructed by Dr. Vivian Lawler to call with update. She said that her cough is good. The cough is rare and it is more like her regular asthma cough.      She said she had a sinus infection last week and has been on antibiot

## 2021-11-04 NOTE — TELEPHONE ENCOUNTER
Dr. Kelton Dunn,     Please sign off on form: Disability update  -Highlight the patient and hit \"Chart\" button.   -In Chart Review, w/in the Encounter tab - click 1 time on the Telephone call encounter for 9/24/21 Scroll down the telephone encounter.  -Click

## 2021-11-05 ENCOUNTER — TELEPHONE (OUTPATIENT)
Dept: NEUROLOGY | Facility: CLINIC | Age: 41
End: 2021-11-05

## 2021-11-05 NOTE — TELEPHONE ENCOUNTER
----- Message from Fabian Hightower MD sent at 11/5/2021  9:30 AM CDT -----  Please let the patient know that results of these particular lab tests so far were normal.    Thank you

## 2021-11-09 ENCOUNTER — OFFICE VISIT (OUTPATIENT)
Dept: PULMONOLOGY | Facility: CLINIC | Age: 41
End: 2021-11-09
Payer: COMMERCIAL

## 2021-11-09 VITALS
OXYGEN SATURATION: 98 % | HEART RATE: 83 BPM | HEIGHT: 68 IN | RESPIRATION RATE: 16 BRPM | WEIGHT: 195 LBS | BODY MASS INDEX: 29.55 KG/M2 | SYSTOLIC BLOOD PRESSURE: 113 MMHG | DIASTOLIC BLOOD PRESSURE: 72 MMHG

## 2021-11-09 DIAGNOSIS — Z86.16 HISTORY OF COVID-19: Primary | ICD-10-CM

## 2021-11-09 PROCEDURE — 3008F BODY MASS INDEX DOCD: CPT | Performed by: INTERNAL MEDICINE

## 2021-11-09 PROCEDURE — 99213 OFFICE O/P EST LOW 20 MIN: CPT | Performed by: INTERNAL MEDICINE

## 2021-11-09 PROCEDURE — 3074F SYST BP LT 130 MM HG: CPT | Performed by: INTERNAL MEDICINE

## 2021-11-09 PROCEDURE — 3078F DIAST BP <80 MM HG: CPT | Performed by: INTERNAL MEDICINE

## 2021-11-09 NOTE — PROGRESS NOTES
The patient is a 10-year-old female who Remedios from prior evaluation comes in now for follow-up. Her cough is gone. She is breathing well. The Tussionex and Breo both helped. She mainly needs cough medication.   She wants to stay on the [facility] although

## 2021-11-11 ENCOUNTER — TELEPHONE (OUTPATIENT)
Dept: ALLERGY | Facility: CLINIC | Age: 41
End: 2021-11-11

## 2021-11-11 NOTE — TELEPHONE ENCOUNTER
Fax from [place] received. Fax states that pt's insurance plan does not cover Spiriva Respimat 2. 5MCG inhaler 4GM 60D. Dr. Patel Dhaliwal would you like us to start a Prior Authorization?

## 2021-11-11 NOTE — TELEPHONE ENCOUNTER
Hello, would you be able to please complete a Prior Authorization for patient's Spiriva Inhaler? Thank you. Medication PA requested: Spiriva Respimat 2. 5MCG INH 4GM 60D     Dx Code:  Moderate persistent extrinsic asthma without complication U16.12    D

## 2021-11-12 ENCOUNTER — TELEPHONE (OUTPATIENT)
Dept: INTERNAL MEDICINE CLINIC | Facility: CLINIC | Age: 41
End: 2021-11-12

## 2021-11-12 NOTE — TELEPHONE ENCOUNTER
Called [employer] clinical review [phone], spoke with Vargas. Inquired if PA required for Spiriva Respimat 2.5MG inhaler,   Is Tier 3, preferred, has quantity limit. She states cannot do a PA for Spiriva.   Can have max of 90 days supply and mu

## 2021-11-12 NOTE — TELEPHONE ENCOUNTER
Ok to get flu shot if she is feeling better   I believe she had covid inf and it wasn't the covid vaccine that gave her the covid inf so it is ok to get the booster   Both these immunizations is also rec by pulm

## 2021-11-12 NOTE — TELEPHONE ENCOUNTER
Patient calling to ask pcp if she should receive the flu vaccine due to being a Covid [place]. She is also asking about receiving the Covid booster as she got sick after receiving the 2nd vaccine. .    Please advise.  We can respond by calling her at 200

## 2021-11-23 NOTE — TELEPHONE ENCOUNTER
We last saw patient in 2021 at that time Dr. Sherice Vann had prescribed Spiriva. It appears it has not been covered. Office visit note from Dr. Victor Hugo Manzanares in 2021 states patient is on Breo.      Called patient to find out which inhaler she is takin

## 2021-12-07 RX ORDER — TIOTROPIUM BROMIDE INHALATION SPRAY 3.12 UG/1
2 SPRAY, METERED RESPIRATORY (INHALATION) DAILY
Qty: 3 EACH | Refills: 0 | Status: SHIPPED | OUTPATIENT
Start: 2021-12-07 | End: 2022-01-28

## 2021-12-07 NOTE — TELEPHONE ENCOUNTER
Spiriva prescription sent to [facility]. Patient will need follow-up appointment prior to any further refills.

## 2021-12-07 NOTE — TELEPHONE ENCOUNTER
Dr. oRsario Nixon, please see PA approval as below. Spiriva is covered only through [facility]. Rx for Spriva pended for routing to [facility]. Original Rx for Bozena Alberto was sent to [facility].      Tiotropium Bromide Monohydrat

## 2021-12-14 NOTE — TELEPHONE ENCOUNTER
Dr. Jose Martin English, please advise. Patient is requesting that she be able to return Spiriva to pharmacy as she has not been taking the medication. Patient last seen in Allergy 2/10/2021 for . . . 1. Asthma  Moderate persistent by history.   Denies activ

## 2021-12-14 NOTE — TELEPHONE ENCOUNTER
[facility] contacted at [phone]. Spoke with Pharmacy Balaji Gerardo. Informed by rep that Spiriva was shipped out on 12/9, delivered 12/14 and cannot be returned. [employer] message sent to patient.

## 2021-12-14 NOTE — TELEPHONE ENCOUNTER
Call reviewed and noted. Per notes patient was on Spiriva at her visit earlier this year. Please call [facility] and cancel the prescription. Please see if patient is allowed to return the Spiriva as patient reports she is no longer using it.   Ple

## 2022-01-17 ENCOUNTER — TELEPHONE (OUTPATIENT)
Dept: INTERNAL MEDICINE CLINIC | Facility: CLINIC | Age: 42
End: 2022-01-17

## 2022-01-17 NOTE — TELEPHONE ENCOUNTER
Patient is requesting an appointment to see PCP on 1/28/2022 at 12:20 PM.  Patient states her migraines have not improved with medication and she also has left ear irritation and rib tender since covid in September. Patient states she works at [employer] and only has 1/28/022 open. Please advise.

## 2022-01-28 ENCOUNTER — OFFICE VISIT (OUTPATIENT)
Dept: INTERNAL MEDICINE CLINIC | Facility: CLINIC | Age: 42
End: 2022-01-28
Payer: COMMERCIAL

## 2022-01-28 VITALS
HEIGHT: 68 IN | HEART RATE: 69 BPM | WEIGHT: 198 LBS | DIASTOLIC BLOOD PRESSURE: 65 MMHG | RESPIRATION RATE: 16 BRPM | BODY MASS INDEX: 30.01 KG/M2 | SYSTOLIC BLOOD PRESSURE: 100 MMHG

## 2022-01-28 DIAGNOSIS — G43.101 MIGRAINE WITH AURA AND WITH STATUS MIGRAINOSUS, NOT INTRACTABLE: Primary | ICD-10-CM

## 2022-01-28 DIAGNOSIS — R07.81 RIB PAIN: ICD-10-CM

## 2022-01-28 DIAGNOSIS — G43.101 MIGRAINE WITH AURA AND WITH STATUS MIGRAINOSUS, NOT INTRACTABLE: ICD-10-CM

## 2022-01-28 DIAGNOSIS — H92.12 EAR DISCHARGE, LEFT: ICD-10-CM

## 2022-01-28 DIAGNOSIS — J45.20 MILD INTERMITTENT ASTHMA WITHOUT COMPLICATION: ICD-10-CM

## 2022-01-28 DIAGNOSIS — R07.89 STERNAL PAIN: ICD-10-CM

## 2022-01-28 PROCEDURE — 3078F DIAST BP <80 MM HG: CPT | Performed by: INTERNAL MEDICINE

## 2022-01-28 PROCEDURE — 3074F SYST BP LT 130 MM HG: CPT | Performed by: INTERNAL MEDICINE

## 2022-01-28 PROCEDURE — 3008F BODY MASS INDEX DOCD: CPT | Performed by: INTERNAL MEDICINE

## 2022-01-28 PROCEDURE — 99214 OFFICE O/P EST MOD 30 MIN: CPT | Performed by: INTERNAL MEDICINE

## 2022-01-28 RX ORDER — ALBUTEROL SULFATE 90 UG/1
2 AEROSOL, METERED RESPIRATORY (INHALATION) EVERY 6 HOURS PRN
Qty: 1 EACH | Refills: 1 | Status: SHIPPED | OUTPATIENT
Start: 2022-01-28

## 2022-01-28 RX ORDER — TOPIRAMATE 25 MG/1
25 CAPSULE, COATED PELLETS ORAL DAILY
Qty: 30 CAPSULE | Refills: 0 | Status: SHIPPED | OUTPATIENT
Start: 2022-01-28

## 2022-01-28 RX ORDER — SUMATRIPTAN 25 MG/1
25 TABLET, FILM COATED ORAL EVERY 2 HOUR PRN
Qty: 20 TABLET | Refills: 0 | Status: SHIPPED | OUTPATIENT
Start: 2022-01-28

## 2022-01-28 RX ORDER — NEOMYCIN SULFATE, POLYMYXIN B SULFATE, HYDROCORTISONE 3.5; 10000; 1 MG/ML; [USP'U]/ML; MG/ML
2 SOLUTION/ DROPS AURICULAR (OTIC) 3 TIMES DAILY
Qty: 1 EACH | Refills: 0 | Status: SHIPPED | OUTPATIENT
Start: 2022-01-28

## 2022-01-28 RX ORDER — TOPIRAMATE 25 MG/1
CAPSULE, COATED PELLETS ORAL
Qty: 90 CAPSULE | Refills: 0 | OUTPATIENT
Start: 2022-01-28

## 2022-01-28 NOTE — PROGRESS NOTES
Soham Shepherd is a 35year old female.   Patient presents with:  Headache  Ear Problem: left       HPI:   Patient comes for follow-up  C/C headache and left ear pain (ITHCES)    C/o still has the sternal pain although it is now specifically to one are office   Lives with daughter 8 yo       Current Outpatient Medications   Medication Sig Dispense Refill   • SUMAtriptan 25 MG Oral Tab Take 1 tablet (25 mg total) by mouth every 2 (two) hours as needed.  20 tablet 0   • albuterol 108 (90 Base) MCG/ACT Inhal HEALTH: No fevers, chills, sweats, fatigue  VISION: No recent vision problems, blurry vision or double vision  HEENT: No decreased hearing ear pain, +itcy and drains ,  No nasal congestion or sore throat  RESPIRATORY: denies shortness of breath, cough, whe Neomycin-Polymyxin-HC 1 % Otic Solution; Place 2 drops in ear(s) in the morning, at noon, and at bedtime.  -     ENT - INTERNAL  Will try eardrops that if not better then however to follow-up with ENT         Preventive medicine  Pap smear done 3/20/2021 n

## 2022-01-28 NOTE — TELEPHONE ENCOUNTER
Duplicate request, previously addressed. Topiramate 25 MG Oral Capsule Sprinkle 30 capsule 0 1/28/2022     Sig - Route: Take 1 capsule (25 mg total) by mouth daily. - Oral    Sent to pharmacy as:  Topiramate 25 MG Oral Capsule Sprinkle    E-Prescribing Status: Receipt confirmed by pharmacy (1/28/2022  1:04 PM CST)

## 2022-02-10 ENCOUNTER — NURSE TRIAGE (OUTPATIENT)
Dept: INTERNAL MEDICINE CLINIC | Facility: CLINIC | Age: 42
End: 2022-02-10

## 2022-02-10 NOTE — TELEPHONE ENCOUNTER
Call placed to patient. Patient identity confirmed by name and date of birth  Pt advised of provider response below. Patient verbalizes understanding and agrees with plan.

## 2022-02-15 ENCOUNTER — TELEPHONE (OUTPATIENT)
Dept: NEUROLOGY | Facility: CLINIC | Age: 42
End: 2022-02-15

## 2022-02-15 ENCOUNTER — OFFICE VISIT (OUTPATIENT)
Dept: NEUROLOGY | Facility: CLINIC | Age: 42
End: 2022-02-15
Payer: COMMERCIAL

## 2022-02-15 VITALS
HEIGHT: 68 IN | WEIGHT: 200 LBS | BODY MASS INDEX: 30.31 KG/M2 | SYSTOLIC BLOOD PRESSURE: 102 MMHG | DIASTOLIC BLOOD PRESSURE: 78 MMHG | HEART RATE: 80 BPM

## 2022-02-15 DIAGNOSIS — G43.101 MIGRAINE WITH AURA AND WITH STATUS MIGRAINOSUS, NOT INTRACTABLE: ICD-10-CM

## 2022-02-15 DIAGNOSIS — M54.81 BILATERAL OCCIPITAL NEURALGIA: ICD-10-CM

## 2022-02-15 DIAGNOSIS — G43.001 MIGRAINE WITHOUT AURA AND WITH STATUS MIGRAINOSUS, NOT INTRACTABLE: ICD-10-CM

## 2022-02-15 DIAGNOSIS — U09.9 LONG COVID: Primary | ICD-10-CM

## 2022-02-15 PROCEDURE — 3074F SYST BP LT 130 MM HG: CPT | Performed by: OTHER

## 2022-02-15 PROCEDURE — 3078F DIAST BP <80 MM HG: CPT | Performed by: OTHER

## 2022-02-15 PROCEDURE — 99214 OFFICE O/P EST MOD 30 MIN: CPT | Performed by: OTHER

## 2022-02-15 PROCEDURE — 3008F BODY MASS INDEX DOCD: CPT | Performed by: OTHER

## 2022-02-15 PROCEDURE — 64405 NJX AA&/STRD GR OCPL NRV: CPT | Performed by: OTHER

## 2022-02-15 RX ORDER — RIZATRIPTAN BENZOATE 10 MG/1
TABLET ORAL
Qty: 12 TABLET | Refills: 3 | Status: SHIPPED | OUTPATIENT
Start: 2022-02-15

## 2022-02-15 RX ORDER — LIDOCAINE HYDROCHLORIDE 10 MG/ML
2 INJECTION, SOLUTION INFILTRATION; PERINEURAL ONCE
Status: COMPLETED | OUTPATIENT
Start: 2022-02-15 | End: 2022-02-15

## 2022-02-15 RX ORDER — TOPIRAMATE 25 MG/1
TABLET ORAL
Qty: 90 TABLET | Refills: 2 | Status: SHIPPED | OUTPATIENT
Start: 2022-02-15 | End: 2022-03-08

## 2022-02-15 RX ORDER — TRIAMCINOLONE ACETONIDE 40 MG/ML
40 INJECTION, SUSPENSION INTRA-ARTICULAR; INTRAMUSCULAR ONCE
Status: COMPLETED | OUTPATIENT
Start: 2022-02-15 | End: 2022-02-15

## 2022-02-15 NOTE — PROCEDURES
Procedure Note: Bilateral Greater Occipital Nerve Block    Indications:  Severe left occipital pain    Informed consent was obtained (explaining the procedure and risks and benefits of procedure) from patient:  the signed consent form was placed in the medical record. A time out was completed, verifying correct patient, procedure,site, positioning, and implants or special equipment. Patient's left occipital area was palpated to identify location of greater occipital nerve. Alcohol was applied topically to the skin. Using a 27 gauge needle (aspirating during insertion), 2 cc of a mixture of Kenalog, 1 cc of 1% lidocaine was injected on the left side (directing needle to center, left and right of painful focus). Pressure with a gauze pad was held briefly upon the site of puncture to minimize bleeding and to further spread anaesthetic subcutaneously. It was repeated on the right side. There were no complications. Patient was comfortable and left without complaint.

## 2022-02-15 NOTE — TELEPHONE ENCOUNTER
[employer] for authorization of approval for Occipital nerve block cpt code 95134 Authorization is not required. Transaction ID: [id number]. Will inform Nursing. Merary Amador

## 2022-02-17 ENCOUNTER — TELEPHONE (OUTPATIENT)
Dept: NEUROLOGY | Facility: CLINIC | Age: 42
End: 2022-02-17

## 2022-02-23 NOTE — TELEPHONE ENCOUNTER
Fax received from [employer]. Case Number [id number]  Rizatriptan Benzonate 10mg tablets up to 18 tablets (1 package of 18) per month does not require authorization at this time per letter.     Letter placed in scanning bin

## 2022-03-08 ENCOUNTER — OFFICE VISIT (OUTPATIENT)
Dept: NEUROLOGY | Facility: CLINIC | Age: 42
End: 2022-03-08
Payer: COMMERCIAL

## 2022-03-08 ENCOUNTER — LAB ENCOUNTER (OUTPATIENT)
Dept: LAB | Facility: HOSPITAL | Age: 42
End: 2022-03-08
Attending: Other
Payer: COMMERCIAL

## 2022-03-08 ENCOUNTER — OFFICE VISIT (OUTPATIENT)
Dept: PODIATRY CLINIC | Facility: CLINIC | Age: 42
End: 2022-03-08
Payer: COMMERCIAL

## 2022-03-08 VITALS
HEART RATE: 84 BPM | BODY MASS INDEX: 30.31 KG/M2 | HEIGHT: 68 IN | WEIGHT: 200 LBS | DIASTOLIC BLOOD PRESSURE: 64 MMHG | SYSTOLIC BLOOD PRESSURE: 102 MMHG

## 2022-03-08 DIAGNOSIS — B35.1 ONYCHOMYCOSIS: Primary | ICD-10-CM

## 2022-03-08 DIAGNOSIS — U09.9 LONG COVID: ICD-10-CM

## 2022-03-08 DIAGNOSIS — G43.001 MIGRAINE WITHOUT AURA AND WITH STATUS MIGRAINOSUS, NOT INTRACTABLE: ICD-10-CM

## 2022-03-08 DIAGNOSIS — G43.001 MIGRAINE WITHOUT AURA AND WITH STATUS MIGRAINOSUS, NOT INTRACTABLE: Primary | ICD-10-CM

## 2022-03-08 LAB
ALBUMIN SERPL-MCNC: 3.8 G/DL (ref 3.4–5)
ALBUMIN/GLOB SERPL: 1.4 {RATIO} (ref 1–2)
ALP LIVER SERPL-CCNC: 57 U/L
ALT SERPL-CCNC: 19 U/L
ANION GAP SERPL CALC-SCNC: 5 MMOL/L (ref 0–18)
AST SERPL-CCNC: 11 U/L (ref 15–37)
BILIRUB SERPL-MCNC: 0.3 MG/DL (ref 0.1–2)
BUN BLD-MCNC: 13 MG/DL (ref 7–18)
BUN/CREAT SERPL: 14.6 (ref 10–20)
CALCIUM BLD-MCNC: 8.6 MG/DL (ref 8.5–10.1)
CHLORIDE SERPL-SCNC: 109 MMOL/L (ref 98–112)
CO2 SERPL-SCNC: 24 MMOL/L (ref 21–32)
CREAT BLD-MCNC: 0.89 MG/DL
FASTING STATUS PATIENT QL REPORTED: NO
GLOBULIN PLAS-MCNC: 2.8 G/DL (ref 2.8–4.4)
OSMOLALITY SERPL CALC.SUM OF ELEC: 286 MOSM/KG (ref 275–295)
POTASSIUM SERPL-SCNC: 4.1 MMOL/L (ref 3.5–5.1)
PROT SERPL-MCNC: 6.6 G/DL (ref 6.4–8.2)
SODIUM SERPL-SCNC: 138 MMOL/L (ref 136–145)

## 2022-03-08 PROCEDURE — 99213 OFFICE O/P EST LOW 20 MIN: CPT | Performed by: PODIATRIST

## 2022-03-08 PROCEDURE — 3008F BODY MASS INDEX DOCD: CPT | Performed by: OTHER

## 2022-03-08 PROCEDURE — 3078F DIAST BP <80 MM HG: CPT | Performed by: OTHER

## 2022-03-08 PROCEDURE — 99214 OFFICE O/P EST MOD 30 MIN: CPT | Performed by: OTHER

## 2022-03-08 PROCEDURE — 80053 COMPREHEN METABOLIC PANEL: CPT

## 2022-03-08 PROCEDURE — 3074F SYST BP LT 130 MM HG: CPT | Performed by: OTHER

## 2022-03-08 PROCEDURE — 36415 COLL VENOUS BLD VENIPUNCTURE: CPT

## 2022-03-08 RX ORDER — DIVALPROEX SODIUM 500 MG/1
TABLET, EXTENDED RELEASE ORAL
Qty: 90 TABLET | Refills: 3 | Status: SHIPPED | OUTPATIENT
Start: 2022-03-08

## 2022-03-09 ENCOUNTER — TELEPHONE (OUTPATIENT)
Dept: NEUROLOGY | Facility: CLINIC | Age: 42
End: 2022-03-09

## 2022-03-09 NOTE — TELEPHONE ENCOUNTER
----- Message from Shannon Otero MD sent at 3/9/2022  7:17 AM CST -----  Please let the patient know that results of these particular lab tests so far were normal.    Thank you

## 2022-03-11 ENCOUNTER — PATIENT MESSAGE (OUTPATIENT)
Dept: INTERNAL MEDICINE CLINIC | Facility: CLINIC | Age: 42
End: 2022-03-11

## 2022-03-11 ENCOUNTER — TELEPHONE (OUTPATIENT)
Dept: ORTHOPEDICS CLINIC | Facility: CLINIC | Age: 42
End: 2022-03-11

## 2022-03-11 RX ORDER — TERBINAFINE HYDROCHLORIDE 250 MG/1
250 TABLET ORAL DAILY
Qty: 90 TABLET | Refills: 0 | Status: SHIPPED | OUTPATIENT
Start: 2022-03-11

## 2022-03-11 NOTE — TELEPHONE ENCOUNTER
Labs done on 3/8/22 ordered by Dr. Julio Rankin. Please advise if you would like patient scheduled to review results or ok to send Lamisil 250 mg 1 tablet per day for 90 days. Thank you.

## 2022-03-11 NOTE — TELEPHONE ENCOUNTER
From: Cody Patel  To: Derrek Santana MD  Sent: 3/11/2022 12:28 PM CST  Subject: Blood work     Hi  Dr Lex Moody did some blood work for me prior to starting some new meds. He said that everything was good to go ahead and start the new medication for my migraines. But I was wondering, there are a couple things that are low but normal, and AST is in the low range. Is this anything to be of concerned about? Just wondering.      Thank you

## 2022-03-11 NOTE — TELEPHONE ENCOUNTER
Called pt verified name and  and informed her liver enzymes in labs were WNL and per Dr. Melita Vivar orders. Confirmed pharmacy. Pt already made 2 month f/u appt. rx sent.

## 2022-04-01 ENCOUNTER — OFFICE VISIT (OUTPATIENT)
Dept: OBGYN CLINIC | Facility: CLINIC | Age: 42
End: 2022-04-01
Payer: COMMERCIAL

## 2022-04-01 VITALS — BODY MASS INDEX: 30 KG/M2 | SYSTOLIC BLOOD PRESSURE: 100 MMHG | DIASTOLIC BLOOD PRESSURE: 68 MMHG | WEIGHT: 198 LBS

## 2022-04-01 DIAGNOSIS — N90.89 VULVAR IRRITATION: Primary | ICD-10-CM

## 2022-04-01 DIAGNOSIS — N89.8 VAGINAL DISCHARGE: ICD-10-CM

## 2022-04-01 PROCEDURE — 99213 OFFICE O/P EST LOW 20 MIN: CPT | Performed by: NURSE PRACTITIONER

## 2022-04-01 PROCEDURE — 3078F DIAST BP <80 MM HG: CPT | Performed by: NURSE PRACTITIONER

## 2022-04-01 PROCEDURE — 3074F SYST BP LT 130 MM HG: CPT | Performed by: NURSE PRACTITIONER

## 2022-04-01 RX ORDER — CLOTRIMAZOLE AND BETAMETHASONE DIPROPIONATE 10; .64 MG/G; MG/G
1 CREAM TOPICAL 2 TIMES DAILY
Qty: 1 EACH | Refills: 0 | Status: SHIPPED | OUTPATIENT
Start: 2022-04-01 | End: 2022-04-08

## 2022-04-03 LAB — TRICHOMONAS SCREEN: NEGATIVE

## 2022-04-04 LAB
C TRACH DNA SPEC QL NAA+PROBE: NEGATIVE
N GONORRHOEA DNA SPEC QL NAA+PROBE: NEGATIVE

## 2022-04-12 ENCOUNTER — OFFICE VISIT (OUTPATIENT)
Dept: OBGYN CLINIC | Facility: CLINIC | Age: 42
End: 2022-04-12
Payer: COMMERCIAL

## 2022-04-12 VITALS — SYSTOLIC BLOOD PRESSURE: 112 MMHG | BODY MASS INDEX: 30 KG/M2 | WEIGHT: 199 LBS | DIASTOLIC BLOOD PRESSURE: 74 MMHG

## 2022-04-12 DIAGNOSIS — N90.89 VULVAR IRRITATION: Primary | ICD-10-CM

## 2022-04-12 DIAGNOSIS — Z86.718 HISTORY OF DVT (DEEP VEIN THROMBOSIS): ICD-10-CM

## 2022-04-12 DIAGNOSIS — Z90.710 H/O: HYSTERECTOMY: ICD-10-CM

## 2022-04-12 PROCEDURE — 99213 OFFICE O/P EST LOW 20 MIN: CPT | Performed by: NURSE PRACTITIONER

## 2022-04-12 PROCEDURE — 3074F SYST BP LT 130 MM HG: CPT | Performed by: NURSE PRACTITIONER

## 2022-04-12 PROCEDURE — 3078F DIAST BP <80 MM HG: CPT | Performed by: NURSE PRACTITIONER

## 2022-04-13 ENCOUNTER — LAB ENCOUNTER (OUTPATIENT)
Dept: LAB | Facility: HOSPITAL | Age: 42
End: 2022-04-13
Attending: NURSE PRACTITIONER
Payer: COMMERCIAL

## 2022-04-13 DIAGNOSIS — N90.89 VULVAR IRRITATION: ICD-10-CM

## 2022-04-13 LAB
FSH SERPL-ACNC: 17.2 MIU/ML
LH SERPL-ACNC: 36.6 MIU/ML

## 2022-04-13 PROCEDURE — 36415 COLL VENOUS BLD VENIPUNCTURE: CPT

## 2022-04-13 PROCEDURE — 83001 ASSAY OF GONADOTROPIN (FSH): CPT

## 2022-04-13 PROCEDURE — 83002 ASSAY OF GONADOTROPIN (LH): CPT

## 2022-04-17 ENCOUNTER — TELEPHONE (OUTPATIENT)
Dept: OBGYN CLINIC | Facility: CLINIC | Age: 42
End: 2022-04-17

## 2022-04-19 ENCOUNTER — TELEMEDICINE (OUTPATIENT)
Dept: NEUROLOGY | Facility: CLINIC | Age: 42
End: 2022-04-19
Payer: COMMERCIAL

## 2022-04-19 DIAGNOSIS — U09.9 LONG COVID: ICD-10-CM

## 2022-04-19 DIAGNOSIS — G43.001 MIGRAINE WITHOUT AURA AND WITH STATUS MIGRAINOSUS, NOT INTRACTABLE: Primary | ICD-10-CM

## 2022-04-19 PROCEDURE — 99214 OFFICE O/P EST MOD 30 MIN: CPT | Performed by: OTHER

## 2022-04-19 RX ORDER — RIZATRIPTAN BENZOATE 10 MG/1
TABLET ORAL
Qty: 12 TABLET | Refills: 3 | Status: SHIPPED | OUTPATIENT
Start: 2022-04-19

## 2022-04-19 RX ORDER — DIVALPROEX SODIUM 500 MG/1
TABLET, EXTENDED RELEASE ORAL
Qty: 90 TABLET | Refills: 3 | Status: SHIPPED | OUTPATIENT
Start: 2022-04-19

## 2022-04-21 ENCOUNTER — LAB ENCOUNTER (OUTPATIENT)
Dept: LAB | Facility: HOSPITAL | Age: 42
End: 2022-04-21
Attending: Other
Payer: COMMERCIAL

## 2022-04-21 DIAGNOSIS — G43.001 MIGRAINE WITHOUT AURA AND WITH STATUS MIGRAINOSUS, NOT INTRACTABLE: ICD-10-CM

## 2022-04-21 LAB
ALBUMIN SERPL-MCNC: 3.6 G/DL (ref 3.4–5)
ALBUMIN/GLOB SERPL: 1.2 {RATIO} (ref 1–2)
ALP LIVER SERPL-CCNC: 46 U/L
ALT SERPL-CCNC: 17 U/L
ANION GAP SERPL CALC-SCNC: 5 MMOL/L (ref 0–18)
AST SERPL-CCNC: 6 U/L (ref 15–37)
BASOPHILS # BLD AUTO: 0.03 X10(3) UL (ref 0–0.2)
BASOPHILS NFR BLD AUTO: 0.5 %
BILIRUB SERPL-MCNC: 0.3 MG/DL (ref 0.1–2)
BUN BLD-MCNC: 11 MG/DL (ref 7–18)
BUN/CREAT SERPL: 15.1 (ref 10–20)
CALCIUM BLD-MCNC: 8.8 MG/DL (ref 8.5–10.1)
CHLORIDE SERPL-SCNC: 105 MMOL/L (ref 98–112)
CO2 SERPL-SCNC: 28 MMOL/L (ref 21–32)
CREAT BLD-MCNC: 0.73 MG/DL
DEPRECATED RDW RBC AUTO: 42 FL (ref 35.1–46.3)
EOSINOPHIL # BLD AUTO: 0.15 X10(3) UL (ref 0–0.7)
EOSINOPHIL NFR BLD AUTO: 2.7 %
ERYTHROCYTE [DISTWIDTH] IN BLOOD BY AUTOMATED COUNT: 13.2 % (ref 11–15)
FASTING STATUS PATIENT QL REPORTED: NO
GLOBULIN PLAS-MCNC: 3 G/DL (ref 2.8–4.4)
GLUCOSE BLD-MCNC: 89 MG/DL (ref 70–99)
HCT VFR BLD AUTO: 39.4 %
HGB BLD-MCNC: 13 G/DL
IMM GRANULOCYTES # BLD AUTO: 0.01 X10(3) UL (ref 0–1)
IMM GRANULOCYTES NFR BLD: 0.2 %
LYMPHOCYTES # BLD AUTO: 1.92 X10(3) UL (ref 1–4)
LYMPHOCYTES NFR BLD AUTO: 34.4 %
MCH RBC QN AUTO: 28.8 PG (ref 26–34)
MCHC RBC AUTO-ENTMCNC: 33 G/DL (ref 31–37)
MCV RBC AUTO: 87.4 FL
MONOCYTES # BLD AUTO: 0.34 X10(3) UL (ref 0.1–1)
MONOCYTES NFR BLD AUTO: 6.1 %
NEUTROPHILS # BLD AUTO: 3.13 X10 (3) UL (ref 1.5–7.7)
NEUTROPHILS # BLD AUTO: 3.13 X10(3) UL (ref 1.5–7.7)
NEUTROPHILS NFR BLD AUTO: 56.1 %
OSMOLALITY SERPL CALC.SUM OF ELEC: 285 MOSM/KG (ref 275–295)
PLATELET # BLD AUTO: 164 10(3)UL (ref 150–450)
POTASSIUM SERPL-SCNC: 4.4 MMOL/L (ref 3.5–5.1)
PROT SERPL-MCNC: 6.6 G/DL (ref 6.4–8.2)
RBC # BLD AUTO: 4.51 X10(6)UL
SODIUM SERPL-SCNC: 138 MMOL/L (ref 136–145)
VALPROATE SERPL-MCNC: 35.8 UG/ML (ref 50–100)
WBC # BLD AUTO: 5.6 X10(3) UL (ref 4–11)

## 2022-04-21 PROCEDURE — 80053 COMPREHEN METABOLIC PANEL: CPT

## 2022-04-21 PROCEDURE — 80164 ASSAY DIPROPYLACETIC ACD TOT: CPT

## 2022-04-21 PROCEDURE — 36415 COLL VENOUS BLD VENIPUNCTURE: CPT

## 2022-04-21 PROCEDURE — 85025 COMPLETE CBC W/AUTO DIFF WBC: CPT

## 2022-04-22 ENCOUNTER — TELEPHONE (OUTPATIENT)
Dept: NEUROLOGY | Facility: CLINIC | Age: 42
End: 2022-04-22

## 2022-04-22 NOTE — TELEPHONE ENCOUNTER
----- Message from Julee Wallis MD sent at 4/22/2022 10:43 AM CDT -----  Please let the patient know that results of these particular lab tests so far were normal.    Thank you

## 2022-04-27 ENCOUNTER — NURSE TRIAGE (OUTPATIENT)
Dept: INTERNAL MEDICINE CLINIC | Facility: CLINIC | Age: 42
End: 2022-04-27

## 2022-04-29 ENCOUNTER — OFFICE VISIT (OUTPATIENT)
Dept: INTERNAL MEDICINE CLINIC | Facility: CLINIC | Age: 42
End: 2022-04-29
Payer: COMMERCIAL

## 2022-04-29 VITALS
WEIGHT: 195 LBS | RESPIRATION RATE: 16 BRPM | BODY MASS INDEX: 29.55 KG/M2 | HEIGHT: 68 IN | SYSTOLIC BLOOD PRESSURE: 121 MMHG | HEART RATE: 76 BPM | DIASTOLIC BLOOD PRESSURE: 79 MMHG

## 2022-04-29 DIAGNOSIS — H92.02 LEFT EAR PAIN: Primary | ICD-10-CM

## 2022-04-29 DIAGNOSIS — N90.89 VULVAR IRRITATION: ICD-10-CM

## 2022-04-29 PROCEDURE — 3008F BODY MASS INDEX DOCD: CPT | Performed by: NURSE PRACTITIONER

## 2022-04-29 PROCEDURE — 99213 OFFICE O/P EST LOW 20 MIN: CPT | Performed by: NURSE PRACTITIONER

## 2022-04-29 PROCEDURE — 3074F SYST BP LT 130 MM HG: CPT | Performed by: NURSE PRACTITIONER

## 2022-04-29 PROCEDURE — 3078F DIAST BP <80 MM HG: CPT | Performed by: NURSE PRACTITIONER

## 2022-04-29 RX ORDER — AZITHROMYCIN 250 MG/1
TABLET, FILM COATED ORAL
Qty: 6 TABLET | Refills: 0 | Status: SHIPPED | OUTPATIENT
Start: 2022-04-29 | End: 2022-05-04

## 2022-05-05 ENCOUNTER — OFFICE VISIT (OUTPATIENT)
Dept: OTOLARYNGOLOGY | Facility: CLINIC | Age: 42
End: 2022-05-05
Payer: COMMERCIAL

## 2022-05-05 ENCOUNTER — NURSE TRIAGE (OUTPATIENT)
Dept: INTERNAL MEDICINE CLINIC | Facility: CLINIC | Age: 42
End: 2022-05-05

## 2022-05-05 VITALS — BODY MASS INDEX: 30.01 KG/M2 | HEIGHT: 68 IN | WEIGHT: 198 LBS

## 2022-05-05 DIAGNOSIS — H92.03 REFERRED OTALGIA OF BOTH EARS: Primary | ICD-10-CM

## 2022-05-05 DIAGNOSIS — Z91.09 ENVIRONMENTAL ALLERGIES: ICD-10-CM

## 2022-05-05 PROCEDURE — 3008F BODY MASS INDEX DOCD: CPT | Performed by: OTOLARYNGOLOGY

## 2022-05-05 PROCEDURE — 99204 OFFICE O/P NEW MOD 45 MIN: CPT | Performed by: OTOLARYNGOLOGY

## 2022-05-05 RX ORDER — PREDNISONE 20 MG/1
TABLET ORAL
Qty: 7 TABLET | Refills: 0 | Status: SHIPPED | OUTPATIENT
Start: 2022-05-05

## 2022-05-05 NOTE — TELEPHONE ENCOUNTER
Spoke with the patient,verified full name and , informed her of message and instructions below, patient verbalized understanding. Recommend she follow up with ENT. She should be able to get an appt before she flies out on . If she not able to follow up with ENT let me know.

## 2022-05-05 NOTE — TELEPHONE ENCOUNTER
Patient saw Dominick Medeiros on 4/29/2022 for left ear pain and given a Z-Edouard. Patient finished the Z-Edouard on 5/3/2022. Still having the left ear pain and her normal allergy symptoms:post nasal drip. Taking zyrtec D or zyrtec and flonase. Patient is flying on 5/8/2022 and does not want symptoms to get worse. Wanted to know what else to do? Please advise.

## 2022-05-06 ENCOUNTER — OFFICE VISIT (OUTPATIENT)
Dept: PODIATRY CLINIC | Facility: CLINIC | Age: 42
End: 2022-05-06
Payer: COMMERCIAL

## 2022-05-06 DIAGNOSIS — B35.1 ONYCHOMYCOSIS: Primary | ICD-10-CM

## 2022-05-06 PROCEDURE — 99212 OFFICE O/P EST SF 10 MIN: CPT | Performed by: PODIATRIST

## 2022-09-22 ENCOUNTER — OFFICE VISIT (OUTPATIENT)
Dept: INTERNAL MEDICINE CLINIC | Facility: CLINIC | Age: 42
End: 2022-09-22

## 2022-09-22 VITALS
WEIGHT: 196.81 LBS | SYSTOLIC BLOOD PRESSURE: 101 MMHG | BODY MASS INDEX: 29.83 KG/M2 | DIASTOLIC BLOOD PRESSURE: 68 MMHG | HEIGHT: 68 IN | HEART RATE: 91 BPM | RESPIRATION RATE: 18 BRPM

## 2022-09-22 DIAGNOSIS — J45.20 MILD INTERMITTENT ASTHMA WITHOUT COMPLICATION: ICD-10-CM

## 2022-09-22 DIAGNOSIS — Z12.31 ENCOUNTER FOR SCREENING MAMMOGRAM FOR MALIGNANT NEOPLASM OF BREAST: ICD-10-CM

## 2022-09-22 DIAGNOSIS — Z00.00 PHYSICAL EXAM, ANNUAL: Primary | ICD-10-CM

## 2022-09-22 PROCEDURE — 3078F DIAST BP <80 MM HG: CPT | Performed by: INTERNAL MEDICINE

## 2022-09-22 PROCEDURE — 99396 PREV VISIT EST AGE 40-64: CPT | Performed by: INTERNAL MEDICINE

## 2022-09-22 PROCEDURE — 3074F SYST BP LT 130 MM HG: CPT | Performed by: INTERNAL MEDICINE

## 2022-09-22 PROCEDURE — 3008F BODY MASS INDEX DOCD: CPT | Performed by: INTERNAL MEDICINE

## 2022-09-22 RX ORDER — ALBUTEROL SULFATE 90 UG/1
2 AEROSOL, METERED RESPIRATORY (INHALATION) EVERY 6 HOURS PRN
Qty: 1 EACH | Refills: 1 | OUTPATIENT
Start: 2022-09-22

## 2022-09-22 RX ORDER — ALBUTEROL SULFATE 2.5 MG/3ML
SOLUTION RESPIRATORY (INHALATION)
Qty: 1 EACH | Refills: 0 | Status: SHIPPED | OUTPATIENT
Start: 2022-09-22

## 2022-09-22 RX ORDER — ALBUTEROL SULFATE 90 UG/1
2 AEROSOL, METERED RESPIRATORY (INHALATION) EVERY 6 HOURS PRN
Qty: 1 EACH | Refills: 1 | Status: SHIPPED | OUTPATIENT
Start: 2022-09-22

## 2022-09-27 ENCOUNTER — LAB ENCOUNTER (OUTPATIENT)
Dept: LAB | Facility: HOSPITAL | Age: 42
End: 2022-09-27
Attending: INTERNAL MEDICINE
Payer: COMMERCIAL

## 2022-09-27 DIAGNOSIS — Z00.00 PHYSICAL EXAM, ANNUAL: ICD-10-CM

## 2022-09-27 LAB
ALBUMIN SERPL-MCNC: 3.6 G/DL (ref 3.4–5)
ALBUMIN/GLOB SERPL: 1.2 {RATIO} (ref 1–2)
ALP LIVER SERPL-CCNC: 53 U/L
ALT SERPL-CCNC: 17 U/L
ANION GAP SERPL CALC-SCNC: 3 MMOL/L (ref 0–18)
AST SERPL-CCNC: 7 U/L (ref 15–37)
BILIRUB SERPL-MCNC: 0.4 MG/DL (ref 0.1–2)
BUN BLD-MCNC: 9 MG/DL (ref 7–18)
BUN/CREAT SERPL: 13.2 (ref 10–20)
CALCIUM BLD-MCNC: 8.6 MG/DL (ref 8.5–10.1)
CHLORIDE SERPL-SCNC: 104 MMOL/L (ref 98–112)
CHOLEST SERPL-MCNC: 168 MG/DL (ref ?–200)
CO2 SERPL-SCNC: 29 MMOL/L (ref 21–32)
CREAT BLD-MCNC: 0.68 MG/DL
DEPRECATED RDW RBC AUTO: 40 FL (ref 35.1–46.3)
ERYTHROCYTE [DISTWIDTH] IN BLOOD BY AUTOMATED COUNT: 12.7 % (ref 11–15)
FASTING PATIENT LIPID ANSWER: YES
FASTING STATUS PATIENT QL REPORTED: YES
GFR SERPLBLD BASED ON 1.73 SQ M-ARVRAT: 111 ML/MIN/1.73M2 (ref 60–?)
GLOBULIN PLAS-MCNC: 3 G/DL (ref 2.8–4.4)
GLUCOSE BLD-MCNC: 85 MG/DL (ref 70–99)
HCT VFR BLD AUTO: 42 %
HDLC SERPL-MCNC: 55 MG/DL (ref 40–59)
HGB BLD-MCNC: 13.9 G/DL
LDLC SERPL CALC-MCNC: 98 MG/DL (ref ?–100)
MCH RBC QN AUTO: 28.7 PG (ref 26–34)
MCHC RBC AUTO-ENTMCNC: 33.1 G/DL (ref 31–37)
MCV RBC AUTO: 86.8 FL
NONHDLC SERPL-MCNC: 113 MG/DL (ref ?–130)
OSMOLALITY SERPL CALC.SUM OF ELEC: 280 MOSM/KG (ref 275–295)
PLATELET # BLD AUTO: 168 10(3)UL (ref 150–450)
POTASSIUM SERPL-SCNC: 4.4 MMOL/L (ref 3.5–5.1)
PROT SERPL-MCNC: 6.6 G/DL (ref 6.4–8.2)
RBC # BLD AUTO: 4.84 X10(6)UL
SODIUM SERPL-SCNC: 136 MMOL/L (ref 136–145)
TRIGL SERPL-MCNC: 80 MG/DL (ref 30–149)
TSI SER-ACNC: 1.13 MIU/ML (ref 0.36–3.74)
VLDLC SERPL CALC-MCNC: 13 MG/DL (ref 0–30)
WBC # BLD AUTO: 5.4 X10(3) UL (ref 4–11)

## 2022-09-27 PROCEDURE — 80053 COMPREHEN METABOLIC PANEL: CPT

## 2022-09-27 PROCEDURE — 85027 COMPLETE CBC AUTOMATED: CPT

## 2022-09-27 PROCEDURE — 80061 LIPID PANEL: CPT

## 2022-09-27 PROCEDURE — 36415 COLL VENOUS BLD VENIPUNCTURE: CPT

## 2022-09-27 PROCEDURE — 84443 ASSAY THYROID STIM HORMONE: CPT

## 2022-09-28 ENCOUNTER — OFFICE VISIT (OUTPATIENT)
Dept: OBGYN CLINIC | Facility: CLINIC | Age: 42
End: 2022-09-28

## 2022-09-28 VITALS
WEIGHT: 196.63 LBS | BODY MASS INDEX: 30 KG/M2 | DIASTOLIC BLOOD PRESSURE: 69 MMHG | HEART RATE: 73 BPM | SYSTOLIC BLOOD PRESSURE: 103 MMHG

## 2022-09-28 DIAGNOSIS — N89.8 VAGINAL DISCHARGE: ICD-10-CM

## 2022-09-28 DIAGNOSIS — N89.8 VAGINAL IRRITATION: Primary | ICD-10-CM

## 2022-09-28 PROCEDURE — 3078F DIAST BP <80 MM HG: CPT | Performed by: NURSE PRACTITIONER

## 2022-09-28 PROCEDURE — 99213 OFFICE O/P EST LOW 20 MIN: CPT | Performed by: NURSE PRACTITIONER

## 2022-09-28 PROCEDURE — 3074F SYST BP LT 130 MM HG: CPT | Performed by: NURSE PRACTITIONER

## 2022-09-28 RX ORDER — NYSTATIN 100000 U/G
1 CREAM TOPICAL 2 TIMES DAILY
Qty: 30 G | Refills: 0 | Status: SHIPPED | OUTPATIENT
Start: 2022-09-28

## 2022-09-29 LAB
C TRACH DNA SPEC QL NAA+PROBE: NEGATIVE
N GONORRHOEA DNA SPEC QL NAA+PROBE: NEGATIVE

## 2022-09-30 LAB
GENITAL VAGINOSIS SCREEN: NEGATIVE
TRICHOMONAS SCREEN: NEGATIVE

## 2022-10-11 ENCOUNTER — HOSPITAL ENCOUNTER (OUTPATIENT)
Dept: MAMMOGRAPHY | Facility: HOSPITAL | Age: 42
Discharge: HOME | End: 2022-10-11
Attending: INTERNAL MEDICINE
Payer: COMMERCIAL

## 2022-10-11 DIAGNOSIS — Z12.31 ENCOUNTER FOR SCREENING MAMMOGRAM FOR MALIGNANT NEOPLASM OF BREAST: ICD-10-CM

## 2022-10-11 PROCEDURE — 77063 BREAST TOMOSYNTHESIS BI: CPT | Performed by: INTERNAL MEDICINE

## 2022-10-11 PROCEDURE — 77067 SCR MAMMO BI INCL CAD: CPT | Performed by: INTERNAL MEDICINE

## 2022-10-12 ENCOUNTER — PATIENT MESSAGE (OUTPATIENT)
Dept: OBGYN CLINIC | Facility: CLINIC | Age: 42
End: 2022-10-12

## 2022-10-12 NOTE — TELEPHONE ENCOUNTER
From: Valdemar Estrada  To: FILIPE Brady  Sent: 10/12/2022 9:10 AM CDT  Subject: Done with medication     Saul Peralta,  I'm done with the medication and there has not been much of a change.

## 2022-10-12 NOTE — TELEPHONE ENCOUNTER
Pt seen 9/28 for vaginal irritation. All labs normal. Pt states she used rx cream and is still symptomatic. Routed to EMB to please review and advise, thank you.

## 2022-10-13 ENCOUNTER — OFFICE VISIT (OUTPATIENT)
Dept: OBGYN CLINIC | Facility: CLINIC | Age: 42
End: 2022-10-13
Payer: COMMERCIAL

## 2022-10-13 VITALS
HEIGHT: 68 IN | BODY MASS INDEX: 29.98 KG/M2 | HEART RATE: 80 BPM | DIASTOLIC BLOOD PRESSURE: 73 MMHG | WEIGHT: 197.81 LBS | SYSTOLIC BLOOD PRESSURE: 118 MMHG

## 2022-10-13 DIAGNOSIS — N89.8 VAGINAL ITCHING: Primary | ICD-10-CM

## 2022-10-13 PROCEDURE — 3074F SYST BP LT 130 MM HG: CPT | Performed by: NURSE PRACTITIONER

## 2022-10-13 PROCEDURE — 3078F DIAST BP <80 MM HG: CPT | Performed by: NURSE PRACTITIONER

## 2022-10-13 PROCEDURE — 3008F BODY MASS INDEX DOCD: CPT | Performed by: NURSE PRACTITIONER

## 2022-10-13 PROCEDURE — 99212 OFFICE O/P EST SF 10 MIN: CPT | Performed by: NURSE PRACTITIONER

## 2022-10-19 ENCOUNTER — IMMUNIZATION (OUTPATIENT)
Dept: LAB | Facility: HOSPITAL | Age: 42
End: 2022-10-19
Attending: PREVENTIVE MEDICINE

## 2022-10-19 DIAGNOSIS — Z23 NEED FOR VACCINATION: Primary | ICD-10-CM

## 2022-10-19 PROCEDURE — 90471 IMMUNIZATION ADMIN: CPT

## 2022-11-22 ENCOUNTER — HOSPITAL ENCOUNTER (OUTPATIENT)
Age: 42
Discharge: HOME | End: 2022-11-22
Payer: COMMERCIAL

## 2022-11-22 VITALS
RESPIRATION RATE: 16 BRPM | OXYGEN SATURATION: 99 % | SYSTOLIC BLOOD PRESSURE: 109 MMHG | TEMPERATURE: 98 F | HEART RATE: 83 BPM | DIASTOLIC BLOOD PRESSURE: 50 MMHG

## 2022-11-22 DIAGNOSIS — H00.011 HORDEOLUM OF RIGHT UPPER EYELID, UNSPECIFIED HORDEOLUM TYPE: Primary | ICD-10-CM

## 2022-11-22 DIAGNOSIS — S05.01XA ABRASION OF RIGHT CORNEA, INITIAL ENCOUNTER: ICD-10-CM

## 2022-11-22 PROCEDURE — 99213 OFFICE O/P EST LOW 20 MIN: CPT

## 2022-11-22 RX ORDER — ERYTHROMYCIN 5 MG/G
1 OINTMENT OPHTHALMIC EVERY 6 HOURS
Qty: 1 G | Refills: 0 | Status: SHIPPED | OUTPATIENT
Start: 2022-11-22 | End: 2022-11-29

## 2022-11-22 NOTE — DISCHARGE INSTRUCTIONS
Apply the antibiotic ointment on the inside of the inner lower lid will coat the eye and the eyelashes you may apply a little extra along the right upper eyelid. Aggressive warm compresses to the right eye 5-7 times per day. Continue to wear your glasses do not wear your contacts. If you develop severe swelling around the entire eye pus or drainage from the eye feel like you are losing vision severe headache fever nausea or vomiting or any new or worsening symptoms go to the nearest emergency department.

## 2022-11-22 NOTE — ED INITIAL ASSESSMENT (HPI)
Pt c/o right upper eyelid swelling, redness and pain x 2 days.  Also c/o right side of face pressure and pain

## 2022-12-05 ENCOUNTER — TELEPHONE (OUTPATIENT)
Dept: INTERNAL MEDICINE CLINIC | Facility: HOSPITAL | Age: 42
End: 2022-12-05

## 2022-12-05 ENCOUNTER — LAB ENCOUNTER (OUTPATIENT)
Dept: LAB | Facility: HOSPITAL | Age: 42
End: 2022-12-05
Attending: PREVENTIVE MEDICINE

## 2022-12-05 DIAGNOSIS — Z20.822 SUSPECTED COVID-19 VIRUS INFECTION: Primary | ICD-10-CM

## 2022-12-05 DIAGNOSIS — Z20.822 SUSPECTED COVID-19 VIRUS INFECTION: ICD-10-CM

## 2022-12-05 LAB — SARS-COV-2 RNA RESP QL NAA+PROBE: NOT DETECTED

## 2022-12-05 NOTE — TELEPHONE ENCOUNTER
Results and RTW guidelines:    COVID RESULT:    [x] Viewed by employee in [address] RTW plan and instructions as indicated on triage call. Manager notified. Estimated RTW date:  12/05/2022  [] Discussed with employee   [] Unable to reach by phone.   Sent via [employer] message      Test type:    [x] Rapid         [] Alinity         [] Outside test:       [x] NEGATIVE     Ordered Alinity retest?  []Yes   [x] No (skip to RTW)   Ordered Rapid retest?   []Yes   [x] No (skip to RTW)           Dated to be taken:      If Yes, PLACE ORDER NOW and instruct the following:  -Originally Symptomatic or Now Symptoms:   -RTW when sx improve- fever free for 24 hours w/o medications, Diarrhea/Vomiting for 24 hours w/o medications    -Originally  Asymptomatic  -Asymptomatic AND Vaccinated or Unvaccinated or Prior infection in past 90 days:   -May work and continue to monitor symptoms for the next 14 days.                                         -Rapid test day 2, rapid test day 5 (day 0 - exposure)

## 2022-12-06 ENCOUNTER — OFFICE VISIT (OUTPATIENT)
Dept: INTERNAL MEDICINE CLINIC | Facility: CLINIC | Age: 42
End: 2022-12-06
Payer: COMMERCIAL

## 2022-12-06 ENCOUNTER — NURSE TRIAGE (OUTPATIENT)
Dept: INTERNAL MEDICINE CLINIC | Facility: CLINIC | Age: 42
End: 2022-12-06

## 2022-12-06 VITALS
BODY MASS INDEX: 29.55 KG/M2 | SYSTOLIC BLOOD PRESSURE: 102 MMHG | HEART RATE: 91 BPM | HEIGHT: 68 IN | WEIGHT: 195 LBS | DIASTOLIC BLOOD PRESSURE: 68 MMHG

## 2022-12-06 DIAGNOSIS — J45.21 MILD INTERMITTENT ASTHMA WITH ACUTE EXACERBATION: ICD-10-CM

## 2022-12-06 DIAGNOSIS — J03.90 TONSILLITIS: Primary | ICD-10-CM

## 2022-12-06 LAB
CONTROL LINE PRESENT WITH A CLEAR BACKGROUND (YES/NO): YES YES/NO
KIT LOT #: NORMAL NUMERIC
STREP GRP A CUL-SCR: NEGATIVE

## 2022-12-06 PROCEDURE — 3008F BODY MASS INDEX DOCD: CPT | Performed by: INTERNAL MEDICINE

## 2022-12-06 PROCEDURE — 99214 OFFICE O/P EST MOD 30 MIN: CPT | Performed by: INTERNAL MEDICINE

## 2022-12-06 PROCEDURE — 87880 STREP A ASSAY W/OPTIC: CPT | Performed by: INTERNAL MEDICINE

## 2022-12-06 PROCEDURE — 3078F DIAST BP <80 MM HG: CPT | Performed by: INTERNAL MEDICINE

## 2022-12-06 PROCEDURE — 3074F SYST BP LT 130 MM HG: CPT | Performed by: INTERNAL MEDICINE

## 2022-12-06 RX ORDER — CODEINE PHOSPHATE AND GUAIFENESIN 10; 100 MG/5ML; MG/5ML
5 SOLUTION ORAL EVERY 6 HOURS PRN
Qty: 236 ML | Refills: 0 | Status: SHIPPED | OUTPATIENT
Start: 2022-12-06

## 2022-12-06 RX ORDER — PREDNISONE 20 MG/1
40 TABLET ORAL DAILY
Qty: 21 TABLET | Refills: 0 | Status: SHIPPED | OUTPATIENT
Start: 2022-12-06 | End: 2022-12-11

## 2022-12-06 RX ORDER — AZITHROMYCIN 250 MG/1
TABLET, FILM COATED ORAL
Qty: 6 TABLET | Refills: 0 | Status: SHIPPED | OUTPATIENT
Start: 2022-12-06 | End: 2022-12-11

## 2022-12-08 ENCOUNTER — PATIENT MESSAGE (OUTPATIENT)
Dept: INTERNAL MEDICINE CLINIC | Facility: CLINIC | Age: 42
End: 2022-12-08

## 2022-12-09 NOTE — TELEPHONE ENCOUNTER
Office staff=please  Assists with the letters. Alexander Mcintosh RN 12/8/2022  5:50 PM CST        ----- Message -----  From: Lizetmartín Altagracia  Sent: 12/8/2022   2:07 PM CST  To: [facility] Rn Triage  Subject: Olga He note                                     Hi Dr. Gracy Newell., I was wondering  if you can redo my doctor's note. 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 Also, I work a second part time job at [employer] in the evening and I know I am not going to be able to make  it to work through the evening  on Friday after working all day. I am supposed to work all weekend at [employer], but I'm just trying to figure out how I'm feeling day by day. I'm trying to to push it too hard, but single mom life. So if it's ok ,could you please make those 2 separate  notes for me. First one is for my main job here at [employer] to go back on Friday. I know I'll be decent if I dint have to talk to alot  of people. Wish me luck on that. Mahesh. The second is for my second job at [employer], to return to work Saturday. Thank you again,   Clovis Oliva only had a portion of the cough medicine with codeine. So hoping what I got will be enough till they get more or get me through  this.

## 2022-12-13 ENCOUNTER — NURSE TRIAGE (OUTPATIENT)
Dept: INTERNAL MEDICINE CLINIC | Facility: CLINIC | Age: 42
End: 2022-12-13

## 2022-12-13 DIAGNOSIS — H66.90 ACUTE OTITIS MEDIA, UNSPECIFIED OTITIS MEDIA TYPE: Primary | ICD-10-CM

## 2022-12-13 RX ORDER — AMOXICILLIN AND CLAVULANATE POTASSIUM 875; 125 MG/1; MG/1
1 TABLET, FILM COATED ORAL 2 TIMES DAILY
Qty: 20 TABLET | Refills: 0 | Status: SHIPPED | OUTPATIENT
Start: 2022-12-13 | End: 2022-12-23

## 2022-12-13 NOTE — TELEPHONE ENCOUNTER
Patient needs to be reexamined by the first available provider. because she recently finished antibiotics. However, I will send her amoxicillin clavulanate prescription as I am getting be out of the office in the next few days.

## 2022-12-13 NOTE — TELEPHONE ENCOUNTER
Patient was left a message to call back. Transfer to triage dept [id number]  Triage further; has she used ibuprofen, sudafed, any discharge? LOV 12/6/22    ------ Message -----  From: Marti Sutton  Sent: 12/12/2022   3:34 PM CST  To: Em Rn Triage  Subject: Left Ear pain                                    Hi Dr. Oneil Hood,    Breathing and coughing  has been getting better each day, I know that's a process for sure. But, the pressure in my left ear has not gotten better and has now begun to hurt and get sharper. To the point that when I swallow something to drink I feel the pinching in my ear but also more inside/throat side. I know I finished the zpack not to long ago, and that is supposed to linger in my system for a bit even when I am done. But when I seen you it was just some ear pressure and now I am actually  having ear pain. Hope to hear from you soon and thank you again.     Marti Sutton

## 2022-12-13 NOTE — TELEPHONE ENCOUNTER
Pt was called and informed of Dr. Rhona Freeman and she will see Brittny Junior on 12/15. Pt not able to come in tomorrow as she will be working at the [facility] location.      Future Appointments   Date Time Provider Nydia Young   12/15/2022  9:00 AM FILIPE Rondon

## 2022-12-15 ENCOUNTER — OFFICE VISIT (OUTPATIENT)
Dept: INTERNAL MEDICINE CLINIC | Facility: CLINIC | Age: 42
End: 2022-12-15
Payer: COMMERCIAL

## 2022-12-15 VITALS
BODY MASS INDEX: 30.01 KG/M2 | HEART RATE: 85 BPM | SYSTOLIC BLOOD PRESSURE: 112 MMHG | WEIGHT: 198 LBS | DIASTOLIC BLOOD PRESSURE: 73 MMHG | HEIGHT: 68 IN | RESPIRATION RATE: 16 BRPM

## 2022-12-15 DIAGNOSIS — J45.21 MILD INTERMITTENT ASTHMA WITH ACUTE EXACERBATION: ICD-10-CM

## 2022-12-15 DIAGNOSIS — H92.02 LEFT EAR PAIN: Primary | ICD-10-CM

## 2022-12-15 PROCEDURE — 3008F BODY MASS INDEX DOCD: CPT | Performed by: NURSE PRACTITIONER

## 2022-12-15 PROCEDURE — 3074F SYST BP LT 130 MM HG: CPT | Performed by: NURSE PRACTITIONER

## 2022-12-15 PROCEDURE — 3078F DIAST BP <80 MM HG: CPT | Performed by: NURSE PRACTITIONER

## 2022-12-15 PROCEDURE — 99214 OFFICE O/P EST MOD 30 MIN: CPT | Performed by: NURSE PRACTITIONER

## 2022-12-15 RX ORDER — MULTIVITAMIN WITH FOLIC ACID 400 MCG
1 TABLET ORAL DAILY
Qty: 30 TABLET | Refills: 0 | COMMUNITY
Start: 2022-12-15

## 2022-12-15 NOTE — PATIENT INSTRUCTIONS
Continue Augmentin as prescribed     Continue prednisone as prescribed     Take zytrec-D daily     Add Flonase nasal spray

## 2023-01-24 ENCOUNTER — OFFICE VISIT (OUTPATIENT)
Dept: OPHTHALMOLOGY | Facility: CLINIC | Age: 43
End: 2023-01-24

## 2023-01-24 DIAGNOSIS — H02.883 MEIBOMIAN GLAND DYSFUNCTION (MGD) OF BOTH EYES: Primary | ICD-10-CM

## 2023-01-24 DIAGNOSIS — H02.886 MEIBOMIAN GLAND DYSFUNCTION (MGD) OF BOTH EYES: Primary | ICD-10-CM

## 2023-01-24 PROCEDURE — 99203 OFFICE O/P NEW LOW 30 MIN: CPT | Performed by: OPHTHALMOLOGY

## 2023-01-24 NOTE — ASSESSMENT & PLAN NOTE
Patient was instructed to use warm compresses to the eyelids twice a day everyday. Instructions for warm compress use:   Patient should place wash compresses on both eyelids for 5 minutes every morning and every night. After 5 minutes of holding the warm compresses on the eyelids, patient should gently rub the eyelashes and then rinse thoroughly with warm water. Patient should also use artificial tears (any over the counter brand is okay) up to 4-6  times per day as needed.

## 2023-01-24 NOTE — PATIENT INSTRUCTIONS
Meibomian gland dysfunction (MGD) of both eyes  Patient was instructed to use warm compresses to the eyelids twice a day everyday. Instructions for warm compress use:   Patient should place wash compresses on both eyelids for 5 minutes every morning and every night. After 5 minutes of holding the warm compresses on the eyelids, patient should gently rub the eyelashes and then rinse thoroughly with warm water. Patient should also use artificial tears (any over the counter brand is okay) up to 4-6  times per day as needed.

## 2023-02-03 ENCOUNTER — HOSPITAL ENCOUNTER (OUTPATIENT)
Dept: CT IMAGING | Age: 43
Discharge: HOME | End: 2023-02-03
Attending: INTERNAL MEDICINE

## 2023-02-03 DIAGNOSIS — Z13.6 SCREENING FOR CARDIOVASCULAR CONDITION: ICD-10-CM

## 2023-03-13 ENCOUNTER — OFFICE VISIT (OUTPATIENT)
Dept: OBGYN CLINIC | Facility: CLINIC | Age: 43
End: 2023-03-13

## 2023-03-13 VITALS
SYSTOLIC BLOOD PRESSURE: 114 MMHG | WEIGHT: 198 LBS | HEART RATE: 73 BPM | DIASTOLIC BLOOD PRESSURE: 77 MMHG | BODY MASS INDEX: 30 KG/M2

## 2023-03-13 DIAGNOSIS — Z01.419 WELL WOMAN EXAM WITH ROUTINE GYNECOLOGICAL EXAM: Primary | ICD-10-CM

## 2023-03-13 DIAGNOSIS — Z12.31 SCREENING MAMMOGRAM FOR BREAST CANCER: ICD-10-CM

## 2023-03-13 DIAGNOSIS — Z11.3 ROUTINE SCREENING FOR STI (SEXUALLY TRANSMITTED INFECTION): ICD-10-CM

## 2023-03-13 DIAGNOSIS — N89.8 VAGINAL DISCHARGE: ICD-10-CM

## 2023-03-13 DIAGNOSIS — R92.2 DENSE BREAST TISSUE ON MAMMOGRAM: ICD-10-CM

## 2023-03-13 PROCEDURE — 3078F DIAST BP <80 MM HG: CPT | Performed by: NURSE PRACTITIONER

## 2023-03-13 PROCEDURE — 99396 PREV VISIT EST AGE 40-64: CPT | Performed by: NURSE PRACTITIONER

## 2023-03-13 PROCEDURE — 3074F SYST BP LT 130 MM HG: CPT | Performed by: NURSE PRACTITIONER

## 2023-03-14 LAB
C TRACH DNA SPEC QL NAA+PROBE: NEGATIVE
N GONORRHOEA DNA SPEC QL NAA+PROBE: NEGATIVE

## 2023-03-16 ENCOUNTER — PATIENT MESSAGE (OUTPATIENT)
Dept: OBGYN CLINIC | Facility: CLINIC | Age: 43
End: 2023-03-16

## 2023-03-16 LAB
GENITAL VAGINOSIS SCREEN: POSITIVE
TRICHOMONAS SCREEN: NEGATIVE

## 2023-03-16 RX ORDER — METRONIDAZOLE 7.5 MG/G
1 GEL VAGINAL NIGHTLY
Qty: 1 EACH | Refills: 0 | Status: SHIPPED | OUTPATIENT
Start: 2023-03-16 | End: 2023-03-21

## 2023-03-16 NOTE — TELEPHONE ENCOUNTER
From: Erick Harris  To: FILIPE Mathur  Sent: 3/16/2023 11:52 AM CDT  Subject: Question regarding CHLAMYDIA/GONOCOCCUS, NEHA    Ok, thank you. Will I need to do a recheck when I am done?

## 2023-03-30 ENCOUNTER — HOSPITAL ENCOUNTER (OUTPATIENT)
Age: 43
Discharge: HOME | End: 2023-03-30
Payer: COMMERCIAL

## 2023-03-30 ENCOUNTER — NURSE TRIAGE (OUTPATIENT)
Dept: INTERNAL MEDICINE CLINIC | Facility: CLINIC | Age: 43
End: 2023-03-30

## 2023-03-30 ENCOUNTER — PATIENT MESSAGE (OUTPATIENT)
Dept: INTERNAL MEDICINE CLINIC | Facility: CLINIC | Age: 43
End: 2023-03-30

## 2023-03-30 VITALS
OXYGEN SATURATION: 100 % | TEMPERATURE: 97 F | HEART RATE: 82 BPM | RESPIRATION RATE: 18 BRPM | SYSTOLIC BLOOD PRESSURE: 116 MMHG | DIASTOLIC BLOOD PRESSURE: 68 MMHG

## 2023-03-30 DIAGNOSIS — S06.0X0A CONCUSSION WITHOUT LOSS OF CONSCIOUSNESS, INITIAL ENCOUNTER: Primary | ICD-10-CM

## 2023-03-30 PROCEDURE — 99213 OFFICE O/P EST LOW 20 MIN: CPT

## 2023-03-30 PROCEDURE — 99214 OFFICE O/P EST MOD 30 MIN: CPT

## 2023-03-30 RX ORDER — CYCLOBENZAPRINE HCL 10 MG
10 TABLET ORAL 3 TIMES DAILY PRN
Qty: 15 TABLET | Refills: 0 | Status: SHIPPED | OUTPATIENT
Start: 2023-03-30 | End: 2023-04-04

## 2023-03-30 RX ORDER — NAPROXEN 500 MG/1
500 TABLET ORAL 2 TIMES DAILY PRN
Qty: 20 TABLET | Refills: 0 | Status: SHIPPED | OUTPATIENT
Start: 2023-03-30 | End: 2023-04-09

## 2023-03-30 NOTE — TELEPHONE ENCOUNTER
From: Babar Schroeder  To: Claudia Mooney MD  Sent: 3/30/2023 1:35 PM CDT  Subject: Headaches, hit head on Sunday     Hi,   Funny story. So, I was cleaning under and behind the couch using my vacuum extension and laying on the floor while doing it. Well, I thought I had to vacuum a bit closer and when I pulled thw hose a little more to reach the opposite end of under the couch, the vacuum fell and knocked me in the head. No bump, no bleeding, was slightly nauseous for a few hours. A headache of course but not dizzy or disoriented. I did ice packs and been doing 800 mg ibuprofen. My head is not tender to touch but it does still hurt in that area. Today it seems to hurt a bit more than yesterday. Anything other than ibuprofen and icing it?     Thanks

## 2023-03-30 NOTE — ED INITIAL ASSESSMENT (HPI)
States was hit by a pati vacuum on the left side of her head on Sunday while cleaning, no loc, c/o left sided headache with nausea, no emesis, no loc, no midline neck tenderness, no bump, no open wound

## 2023-03-30 NOTE — DISCHARGE INSTRUCTIONS
As discussed, please rest, drink plenty of water, stay well-hydrated. Naproxen twice a day for 10 days. Flexeril as needed for neck muscle tension. Please be aware this medication can cause dizziness and drowsiness. You should not work, drink, drive on this medication. Concussion protocol as follows: Stay in dark quiet environment. Avoid eyestrain: Phone, computer, tablet. You should not perform any physical labor, exercise, physical exertion or activity. Avoid second head injury. If you have any new or worsening symptoms such as sudden and severe headache, headache that is worse with exertion, headache associated with loss of vision blurry vision double vision, persistent vomiting, inability to perform daily activities, go to ER.

## 2023-03-31 ENCOUNTER — TELEPHONE (OUTPATIENT)
Dept: INTERNAL MEDICINE CLINIC | Facility: CLINIC | Age: 43
End: 2023-03-31

## 2023-03-31 NOTE — TELEPHONE ENCOUNTER
Patient called office. With updates from Urgent Care Visit yesterday       No CT Scan completed, she was told by our Triage Nurse that a CT scan is needed. RN advised that decision and order would be determined by provider that evaluates patient in Immediate Care. And if they recommended it, they would send to ER. She says symptoms have not worsened. Feels \"buzzed\" feeling after taking flexeril this morning. Denies confusion, loss of consciousness. Denies headache, sudden vision changes, nausea, vomiting. Wondering if CT scan is needed still or next steps moving forward. RN advised appointment. appointment made. But if worsening symptoms, go to ER and keep appt as a f/u visit. And if concussion / head trauma in the future - ER is better place to be evaluated than Urgent Care. She verbalizes understanding of all information, and agreeable to plan.        Future Appointments   Date Time Provider Nydia Young   4/4/2023  9:40 AM FILIPE Pascual  [address]

## 2023-04-04 ENCOUNTER — OFFICE VISIT (OUTPATIENT)
Dept: INTERNAL MEDICINE CLINIC | Facility: CLINIC | Age: 43
End: 2023-04-04

## 2023-04-04 VITALS
HEIGHT: 68 IN | DIASTOLIC BLOOD PRESSURE: 71 MMHG | SYSTOLIC BLOOD PRESSURE: 108 MMHG | BODY MASS INDEX: 30.77 KG/M2 | HEART RATE: 92 BPM | RESPIRATION RATE: 16 BRPM | WEIGHT: 203 LBS

## 2023-04-04 DIAGNOSIS — S06.0X0D CONCUSSION WITHOUT LOSS OF CONSCIOUSNESS, SUBSEQUENT ENCOUNTER: Primary | ICD-10-CM

## 2023-04-04 PROCEDURE — 3008F BODY MASS INDEX DOCD: CPT | Performed by: NURSE PRACTITIONER

## 2023-04-04 PROCEDURE — 99213 OFFICE O/P EST LOW 20 MIN: CPT | Performed by: NURSE PRACTITIONER

## 2023-04-04 PROCEDURE — 3078F DIAST BP <80 MM HG: CPT | Performed by: NURSE PRACTITIONER

## 2023-04-04 PROCEDURE — 3074F SYST BP LT 130 MM HG: CPT | Performed by: NURSE PRACTITIONER

## 2023-04-04 NOTE — PATIENT INSTRUCTIONS
You can continue naproxen twice a day as needed for headaches. You can take tylenol 500mg with naproxen. You can continue flexeril at night time. Take 5mg or a 1/2 tablet to decrease drowsiness. You can use heat therapy to help with neck stiffness.

## 2023-04-05 RX ORDER — FLUCONAZOLE 150 MG/1
150 TABLET ORAL ONCE
Qty: 1 TABLET | Refills: 0 | Status: SHIPPED | OUTPATIENT
Start: 2023-04-05 | End: 2023-04-05

## 2023-05-17 ENCOUNTER — OFFICE VISIT (OUTPATIENT)
Dept: PODIATRY CLINIC | Facility: CLINIC | Age: 43
End: 2023-05-17

## 2023-05-17 DIAGNOSIS — B35.1 DERMATOPHYTOSIS OF NAIL: ICD-10-CM

## 2023-05-17 DIAGNOSIS — M20.11 VALGUS DEFORMITY OF BOTH GREAT TOES: ICD-10-CM

## 2023-05-17 DIAGNOSIS — M79.672 LEFT FOOT PAIN: ICD-10-CM

## 2023-05-17 DIAGNOSIS — M79.671 RIGHT FOOT PAIN: ICD-10-CM

## 2023-05-17 DIAGNOSIS — G57.82 NEUROMA OF THIRD INTERSPACE OF LEFT FOOT: Primary | ICD-10-CM

## 2023-05-17 DIAGNOSIS — M20.12 VALGUS DEFORMITY OF BOTH GREAT TOES: ICD-10-CM

## 2023-05-17 PROCEDURE — 64455 NJX AA&/STRD PLTR COM DG NRV: CPT | Performed by: STUDENT IN AN ORGANIZED HEALTH CARE EDUCATION/TRAINING PROGRAM

## 2023-05-17 RX ORDER — DEXAMETHASONE SODIUM PHOSPHATE 4 MG/ML
2 VIAL (ML) INJECTION ONCE
Status: COMPLETED | OUTPATIENT
Start: 2023-05-17 | End: 2023-05-17

## 2023-05-18 NOTE — PROCEDURES
Per Dr Ahmet Glass  to draw up .5ml of dexamethasone sodium phosphate, .5ml kenalog-10 and 1ml marcaine 0.5% for a left foot injection.

## 2023-05-21 NOTE — PATIENT INSTRUCTIONS
Monitor response to cortisone injection. Ambulate with supportive shoes with wide toe box and ready orthotics. Can follow-up in approximately 1 month if symptoms fail to resolve.

## 2023-06-07 ENCOUNTER — OFFICE VISIT (OUTPATIENT)
Dept: PODIATRY CLINIC | Facility: CLINIC | Age: 43
End: 2023-06-07

## 2023-06-07 VITALS — DIASTOLIC BLOOD PRESSURE: 72 MMHG | SYSTOLIC BLOOD PRESSURE: 110 MMHG

## 2023-06-07 DIAGNOSIS — M20.11 VALGUS DEFORMITY OF BOTH GREAT TOES: ICD-10-CM

## 2023-06-07 DIAGNOSIS — G57.82 NEUROMA OF THIRD INTERSPACE OF LEFT FOOT: Primary | ICD-10-CM

## 2023-06-07 DIAGNOSIS — B35.1 ONYCHOMYCOSIS: ICD-10-CM

## 2023-06-07 DIAGNOSIS — B35.1 DERMATOPHYTOSIS OF NAIL: ICD-10-CM

## 2023-06-07 DIAGNOSIS — M20.12 VALGUS DEFORMITY OF BOTH GREAT TOES: ICD-10-CM

## 2023-06-07 DIAGNOSIS — M79.672 LEFT FOOT PAIN: ICD-10-CM

## 2023-06-07 PROCEDURE — 64455 NJX AA&/STRD PLTR COM DG NRV: CPT | Performed by: STUDENT IN AN ORGANIZED HEALTH CARE EDUCATION/TRAINING PROGRAM

## 2023-06-07 PROCEDURE — 3074F SYST BP LT 130 MM HG: CPT | Performed by: STUDENT IN AN ORGANIZED HEALTH CARE EDUCATION/TRAINING PROGRAM

## 2023-06-07 PROCEDURE — 3078F DIAST BP <80 MM HG: CPT | Performed by: STUDENT IN AN ORGANIZED HEALTH CARE EDUCATION/TRAINING PROGRAM

## 2023-06-07 NOTE — PROGRESS NOTES
Per Dr. Denzel Bains to draw up .5ml of dexamethasone sodium phosphate. .5ml Kenalog. 10 and 1ml marcaine 0.5% for a left foot injection.

## 2023-06-11 RX ORDER — DEXAMETHASONE SODIUM PHOSPHATE 4 MG/ML
2 VIAL (ML) INJECTION ONCE
Status: SHIPPED | OUTPATIENT
Start: 2023-06-11

## 2023-06-12 ENCOUNTER — OFFICE VISIT (OUTPATIENT)
Dept: OBGYN CLINIC | Facility: CLINIC | Age: 43
End: 2023-06-12

## 2023-06-12 VITALS
HEART RATE: 72 BPM | WEIGHT: 205.63 LBS | SYSTOLIC BLOOD PRESSURE: 109 MMHG | DIASTOLIC BLOOD PRESSURE: 73 MMHG | BODY MASS INDEX: 31 KG/M2

## 2023-06-12 DIAGNOSIS — L73.9 FOLLICULITIS: Primary | ICD-10-CM

## 2023-06-12 PROCEDURE — 99212 OFFICE O/P EST SF 10 MIN: CPT | Performed by: NURSE PRACTITIONER

## 2023-06-12 PROCEDURE — 3078F DIAST BP <80 MM HG: CPT | Performed by: NURSE PRACTITIONER

## 2023-06-12 PROCEDURE — 3074F SYST BP LT 130 MM HG: CPT | Performed by: NURSE PRACTITIONER

## 2023-06-12 RX ORDER — FLUTICASONE PROPIONATE 50 MCG
SPRAY, SUSPENSION (ML) NASAL DAILY
COMMUNITY

## 2023-06-14 ENCOUNTER — TELEPHONE (OUTPATIENT)
Dept: PODIATRY CLINIC | Facility: CLINIC | Age: 43
End: 2023-06-14

## 2023-06-14 DIAGNOSIS — G57.82 NEUROMA OF THIRD INTERSPACE OF LEFT FOOT: Primary | ICD-10-CM

## 2023-06-15 NOTE — TELEPHONE ENCOUNTER
Patient has new pain along third, fourth, and fifth metatarsal shafts after being very active this weekend, concerning for stress fracture. Given her neuroma symptoms and now symptoms concerning for stress reaction, will order MRI for further evaluation.

## 2023-06-19 ENCOUNTER — HOSPITAL ENCOUNTER (OUTPATIENT)
Dept: MRI IMAGING | Facility: HOSPITAL | Age: 43
Discharge: HOME | End: 2023-06-19
Attending: STUDENT IN AN ORGANIZED HEALTH CARE EDUCATION/TRAINING PROGRAM
Payer: COMMERCIAL

## 2023-06-19 DIAGNOSIS — G57.82 NEUROMA OF THIRD INTERSPACE OF LEFT FOOT: ICD-10-CM

## 2023-06-19 PROCEDURE — 73720 MRI LWR EXTREMITY W/O&W/DYE: CPT | Performed by: STUDENT IN AN ORGANIZED HEALTH CARE EDUCATION/TRAINING PROGRAM

## 2023-06-19 PROCEDURE — A9575 INJ GADOTERATE MEGLUMI 0.1ML: HCPCS | Performed by: STUDENT IN AN ORGANIZED HEALTH CARE EDUCATION/TRAINING PROGRAM

## 2023-06-19 RX ORDER — GADOTERATE MEGLUMINE 376.9 MG/ML
20 INJECTION INTRAVENOUS
Status: COMPLETED | OUTPATIENT
Start: 2023-06-19 | End: 2023-06-19

## 2023-06-19 RX ADMIN — GADOTERATE MEGLUMINE 19 ML: 376.9 INJECTION INTRAVENOUS at 16:29:00

## 2023-06-28 ENCOUNTER — OFFICE VISIT (OUTPATIENT)
Dept: NEUROLOGY | Facility: CLINIC | Age: 43
End: 2023-06-28
Payer: COMMERCIAL

## 2023-06-28 VITALS
BODY MASS INDEX: 31.37 KG/M2 | DIASTOLIC BLOOD PRESSURE: 60 MMHG | SYSTOLIC BLOOD PRESSURE: 100 MMHG | HEIGHT: 68 IN | WEIGHT: 207 LBS

## 2023-06-28 DIAGNOSIS — G43.001 MIGRAINE WITHOUT AURA AND WITH STATUS MIGRAINOSUS, NOT INTRACTABLE: Primary | ICD-10-CM

## 2023-06-28 PROCEDURE — 99212 OFFICE O/P EST SF 10 MIN: CPT | Performed by: OTHER

## 2023-06-28 PROCEDURE — 3074F SYST BP LT 130 MM HG: CPT | Performed by: OTHER

## 2023-06-28 PROCEDURE — 3008F BODY MASS INDEX DOCD: CPT | Performed by: OTHER

## 2023-06-28 PROCEDURE — 3078F DIAST BP <80 MM HG: CPT | Performed by: OTHER

## 2023-06-28 RX ORDER — RIZATRIPTAN BENZOATE 10 MG/1
TABLET ORAL
Qty: 12 TABLET | Refills: 3 | Status: SHIPPED | OUTPATIENT
Start: 2023-06-28

## 2023-07-17 ENCOUNTER — APPOINTMENT (OUTPATIENT)
Dept: GENERAL RADIOLOGY | Facility: HOSPITAL | Age: 43
End: 2023-07-17
Attending: NURSE PRACTITIONER
Payer: COMMERCIAL

## 2023-07-17 ENCOUNTER — HOSPITAL ENCOUNTER (EMERGENCY)
Facility: HOSPITAL | Age: 43
Discharge: HOME | End: 2023-07-17
Payer: COMMERCIAL

## 2023-07-17 VITALS
BODY MASS INDEX: 30.31 KG/M2 | SYSTOLIC BLOOD PRESSURE: 115 MMHG | HEIGHT: 68 IN | HEART RATE: 73 BPM | DIASTOLIC BLOOD PRESSURE: 66 MMHG | WEIGHT: 200 LBS | RESPIRATION RATE: 14 BRPM | TEMPERATURE: 98 F | OXYGEN SATURATION: 98 %

## 2023-07-17 DIAGNOSIS — R06.00 DYSPNEA, UNSPECIFIED TYPE: Primary | ICD-10-CM

## 2023-07-17 LAB
ANION GAP SERPL CALC-SCNC: 5 MMOL/L (ref 0–18)
ATRIAL RATE: 79 BPM
BASOPHILS # BLD AUTO: 0.03 X10(3) UL (ref 0–0.2)
BASOPHILS NFR BLD AUTO: 0.5 %
BUN BLD-MCNC: 7 MG/DL (ref 7–18)
BUN/CREAT SERPL: 9.1 (ref 10–20)
CALCIUM BLD-MCNC: 9.1 MG/DL (ref 8.5–10.1)
CHLORIDE SERPL-SCNC: 107 MMOL/L (ref 98–112)
CO2 SERPL-SCNC: 26 MMOL/L (ref 21–32)
CREAT BLD-MCNC: 0.77 MG/DL
D DIMER PPP FEU-MCNC: <0.27 UG/ML FEU (ref ?–0.5)
DEPRECATED RDW RBC AUTO: 39.9 FL (ref 35.1–46.3)
EOSINOPHIL # BLD AUTO: 0.14 X10(3) UL (ref 0–0.7)
EOSINOPHIL NFR BLD AUTO: 2.4 %
ERYTHROCYTE [DISTWIDTH] IN BLOOD BY AUTOMATED COUNT: 12.9 % (ref 11–15)
GFR SERPLBLD BASED ON 1.73 SQ M-ARVRAT: 97 ML/MIN/1.73M2 (ref 60–?)
GLUCOSE BLD-MCNC: 90 MG/DL (ref 70–99)
HCT VFR BLD AUTO: 42.5 %
HGB BLD-MCNC: 14.3 G/DL
IMM GRANULOCYTES # BLD AUTO: 0.01 X10(3) UL (ref 0–1)
IMM GRANULOCYTES NFR BLD: 0.2 %
LYMPHOCYTES # BLD AUTO: 1.71 X10(3) UL (ref 1–4)
LYMPHOCYTES NFR BLD AUTO: 29.3 %
MCH RBC QN AUTO: 28.8 PG (ref 26–34)
MCHC RBC AUTO-ENTMCNC: 33.6 G/DL (ref 31–37)
MCV RBC AUTO: 85.7 FL
MONOCYTES # BLD AUTO: 0.31 X10(3) UL (ref 0.1–1)
MONOCYTES NFR BLD AUTO: 5.3 %
NEUTROPHILS # BLD AUTO: 3.64 X10 (3) UL (ref 1.5–7.7)
NEUTROPHILS # BLD AUTO: 3.64 X10(3) UL (ref 1.5–7.7)
NEUTROPHILS NFR BLD AUTO: 62.3 %
OSMOLALITY SERPL CALC.SUM OF ELEC: 284 MOSM/KG (ref 275–295)
P AXIS: 21 DEGREES
P-R INTERVAL: 134 MS
PLATELET # BLD AUTO: 193 10(3)UL (ref 150–450)
POTASSIUM SERPL-SCNC: 4.3 MMOL/L (ref 3.5–5.1)
Q-T INTERVAL: 360 MS
QRS DURATION: 76 MS
QTC CALCULATION (BEZET): 412 MS
R AXIS: 36 DEGREES
RBC # BLD AUTO: 4.96 X10(6)UL
SODIUM SERPL-SCNC: 138 MMOL/L (ref 136–145)
T AXIS: 43 DEGREES
TROPONIN I HIGH SENSITIVITY: 3 NG/L
VENTRICULAR RATE: 79 BPM
WBC # BLD AUTO: 5.8 X10(3) UL (ref 4–11)

## 2023-07-17 PROCEDURE — 99284 EMERGENCY DEPT VISIT MOD MDM: CPT

## 2023-07-17 PROCEDURE — 80048 BASIC METABOLIC PNL TOTAL CA: CPT | Performed by: NURSE PRACTITIONER

## 2023-07-17 PROCEDURE — 36415 COLL VENOUS BLD VENIPUNCTURE: CPT

## 2023-07-17 PROCEDURE — 93005 ELECTROCARDIOGRAM TRACING: CPT

## 2023-07-17 PROCEDURE — 84484 ASSAY OF TROPONIN QUANT: CPT | Performed by: NURSE PRACTITIONER

## 2023-07-17 PROCEDURE — 85025 COMPLETE CBC W/AUTO DIFF WBC: CPT | Performed by: NURSE PRACTITIONER

## 2023-07-17 PROCEDURE — 71045 X-RAY EXAM CHEST 1 VIEW: CPT | Performed by: NURSE PRACTITIONER

## 2023-07-17 PROCEDURE — 85379 FIBRIN DEGRADATION QUANT: CPT | Performed by: NURSE PRACTITIONER

## 2023-07-17 PROCEDURE — 93010 ELECTROCARDIOGRAM REPORT: CPT

## 2023-07-17 RX ORDER — PREDNISONE 20 MG/1
40 TABLET ORAL DAILY
Qty: 10 TABLET | Refills: 0 | Status: SHIPPED | OUTPATIENT
Start: 2023-07-17 | End: 2023-07-22

## 2023-07-17 NOTE — ED INITIAL ASSESSMENT (HPI)
Patient arrived ambulatory c/o sob and mild cp that started this morning. Hx asthma. No relief with inhaler.

## 2023-07-18 ENCOUNTER — TELEPHONE (OUTPATIENT)
Dept: INTERNAL MEDICINE CLINIC | Facility: CLINIC | Age: 43
End: 2023-07-18

## 2023-07-18 NOTE — TELEPHONE ENCOUNTER
Patient called (identified name and ),   Went to [facility] ED yesterday for chest pain/dyspnea. Given prednisone, will start today. Still sore in chest, left side. Seeking follow up appointment.   Scheduled appointment:  Future Appointments   Date Time Provider Nydia Young   2023 10:20 AM Ozzy Smallwood MD [facility] [employer]

## 2023-07-20 ENCOUNTER — LAB ENCOUNTER (OUTPATIENT)
Dept: LAB | Age: 43
End: 2023-07-20
Attending: INTERNAL MEDICINE
Payer: COMMERCIAL

## 2023-07-20 ENCOUNTER — OFFICE VISIT (OUTPATIENT)
Dept: INTERNAL MEDICINE CLINIC | Facility: CLINIC | Age: 43
End: 2023-07-20

## 2023-07-20 VITALS
HEIGHT: 68 IN | RESPIRATION RATE: 17 BRPM | TEMPERATURE: 98 F | SYSTOLIC BLOOD PRESSURE: 121 MMHG | WEIGHT: 209.81 LBS | OXYGEN SATURATION: 100 % | HEART RATE: 83 BPM | BODY MASS INDEX: 31.8 KG/M2 | DIASTOLIC BLOOD PRESSURE: 78 MMHG

## 2023-07-20 DIAGNOSIS — R07.9 CHEST PAIN, UNSPECIFIED TYPE: ICD-10-CM

## 2023-07-20 DIAGNOSIS — R06.02 SOB (SHORTNESS OF BREATH): ICD-10-CM

## 2023-07-20 DIAGNOSIS — R07.9 CHEST PAIN, UNSPECIFIED TYPE: Primary | ICD-10-CM

## 2023-07-20 PROBLEM — J45.20 MILD INTERMITTENT ASTHMA WITHOUT COMPLICATION: Status: ACTIVE | Noted: 2023-07-20

## 2023-07-20 PROBLEM — J45.20 MILD INTERMITTENT ASTHMA WITHOUT COMPLICATION (HCC): Status: ACTIVE | Noted: 2023-07-20

## 2023-07-20 LAB
CRP SERPL-MCNC: <0.29 MG/DL (ref ?–0.3)
D DIMER PPP FEU-MCNC: <0.27 UG/ML FEU (ref ?–0.5)
ERYTHROCYTE [SEDIMENTATION RATE] IN BLOOD: 9 MM/HR

## 2023-07-20 PROCEDURE — 36415 COLL VENOUS BLD VENIPUNCTURE: CPT

## 2023-07-20 PROCEDURE — 86038 ANTINUCLEAR ANTIBODIES: CPT

## 2023-07-20 PROCEDURE — 86140 C-REACTIVE PROTEIN: CPT

## 2023-07-20 PROCEDURE — 3074F SYST BP LT 130 MM HG: CPT | Performed by: INTERNAL MEDICINE

## 2023-07-20 PROCEDURE — 85652 RBC SED RATE AUTOMATED: CPT

## 2023-07-20 PROCEDURE — 86225 DNA ANTIBODY NATIVE: CPT

## 2023-07-20 PROCEDURE — 3008F BODY MASS INDEX DOCD: CPT | Performed by: INTERNAL MEDICINE

## 2023-07-20 PROCEDURE — 85379 FIBRIN DEGRADATION QUANT: CPT

## 2023-07-20 PROCEDURE — 99214 OFFICE O/P EST MOD 30 MIN: CPT | Performed by: INTERNAL MEDICINE

## 2023-07-20 PROCEDURE — 3078F DIAST BP <80 MM HG: CPT | Performed by: INTERNAL MEDICINE

## 2023-07-20 RX ORDER — MONTELUKAST SODIUM 10 MG/1
10 TABLET ORAL NIGHTLY
Qty: 60 TABLET | Refills: 0 | Status: SHIPPED | OUTPATIENT
Start: 2023-07-20

## 2023-07-20 RX ORDER — MELOXICAM 7.5 MG/1
7.5 TABLET ORAL 2 TIMES DAILY
Qty: 30 TABLET | Refills: 0 | Status: SHIPPED | OUTPATIENT
Start: 2023-07-20

## 2023-07-21 ENCOUNTER — HOSPITAL ENCOUNTER (OUTPATIENT)
Dept: CV DIAGNOSTICS | Facility: HOSPITAL | Age: 43
Discharge: HOME | End: 2023-07-21
Attending: INTERNAL MEDICINE
Payer: COMMERCIAL

## 2023-07-21 DIAGNOSIS — R07.9 CHEST PAIN, UNSPECIFIED TYPE: ICD-10-CM

## 2023-07-21 LAB
% OF MAX PREDICTED HR: 100 %
DSDNA IGG SERPL IA-ACNC: 0.7 IU/ML
ENA AB SER QL IA: 0.1 UG/L
ENA AB SER QL IA: NEGATIVE
MAX DIASTOLIC BP: 70 MMHG
MAX HEART RATE: 157 BPM
MAX PREDICTED HEART RATE: 176 BPM
MAX SYSTOLIC BP: 135 MMHG
MAX WORK LOAD: 80

## 2023-07-21 PROCEDURE — 93017 CV STRESS TEST TRACING ONLY: CPT | Performed by: INTERNAL MEDICINE

## 2023-07-21 PROCEDURE — 93016 CV STRESS TEST SUPVJ ONLY: CPT | Performed by: INTERNAL MEDICINE

## 2023-07-21 PROCEDURE — 93018 CV STRESS TEST I&R ONLY: CPT | Performed by: INTERNAL MEDICINE

## 2023-07-21 RX ORDER — MONTELUKAST SODIUM 10 MG/1
10 TABLET ORAL NIGHTLY
Qty: 90 TABLET | Refills: 0 | OUTPATIENT
Start: 2023-07-21

## 2023-07-27 ENCOUNTER — PATIENT MESSAGE (OUTPATIENT)
Dept: INTERNAL MEDICINE CLINIC | Facility: CLINIC | Age: 43
End: 2023-07-27

## 2023-07-28 NOTE — TELEPHONE ENCOUNTER
From: Manpreet Gramajo  To: Jayshree Rae MD  Sent: 7/27/2023 1:47 PM CDT  Subject: Next steps? I'm glad that all my tests have been good so far. Sharp pains have calmed down quite a bit, just still very short of breathe. Life with Asthma and this air quality I know is not helping any of this. Any ideas on next steps or are we just waiting to see how it goes for a little bit? I also have submitted FMLA forms for intermittent leave due to my asthma since it has been extremely unhappy lately. Thank you.

## 2023-08-25 ENCOUNTER — OFFICE VISIT (OUTPATIENT)
Dept: OTOLARYNGOLOGY | Facility: CLINIC | Age: 43
End: 2023-08-25

## 2023-08-25 VITALS — HEIGHT: 68 IN | WEIGHT: 209.81 LBS | BODY MASS INDEX: 31.8 KG/M2 | TEMPERATURE: 97 F

## 2023-08-25 DIAGNOSIS — L29.9 EAR ITCH: Primary | ICD-10-CM

## 2023-08-25 RX ORDER — BETAMETHASONE DIPROPIONATE 0.05 %
1 OINTMENT (GRAM) TOPICAL DAILY PRN
Qty: 15 G | Refills: 0 | Status: SHIPPED | OUTPATIENT
Start: 2023-08-25

## 2023-08-29 ENCOUNTER — TELEPHONE (OUTPATIENT)
Dept: PODIATRY CLINIC | Facility: CLINIC | Age: 43
End: 2023-08-29

## 2023-08-29 DIAGNOSIS — G57.82 NEUROMA OF THIRD INTERSPACE OF LEFT FOOT: Primary | ICD-10-CM

## 2023-08-29 DIAGNOSIS — M79.672 LEFT FOOT PAIN: ICD-10-CM

## 2023-09-19 ENCOUNTER — APPOINTMENT (OUTPATIENT)
Dept: GENERAL RADIOLOGY | Age: 43
End: 2023-09-19
Attending: PHYSICIAN ASSISTANT
Payer: COMMERCIAL

## 2023-09-19 ENCOUNTER — HOSPITAL ENCOUNTER (OUTPATIENT)
Age: 43
Discharge: HOME | End: 2023-09-19
Payer: COMMERCIAL

## 2023-09-19 VITALS
WEIGHT: 212 LBS | OXYGEN SATURATION: 98 % | HEART RATE: 97 BPM | RESPIRATION RATE: 20 BRPM | SYSTOLIC BLOOD PRESSURE: 135 MMHG | BODY MASS INDEX: 32.13 KG/M2 | DIASTOLIC BLOOD PRESSURE: 89 MMHG | HEIGHT: 68 IN | TEMPERATURE: 99 F

## 2023-09-19 DIAGNOSIS — Z87.09 HISTORY OF ALLERGIC RHINITIS: ICD-10-CM

## 2023-09-19 DIAGNOSIS — Z87.09 HISTORY OF ASTHMA: ICD-10-CM

## 2023-09-19 DIAGNOSIS — J04.0 ACUTE LARYNGITIS: ICD-10-CM

## 2023-09-19 DIAGNOSIS — J22 ACUTE LOWER RESPIRATORY INFECTION: Primary | ICD-10-CM

## 2023-09-19 DIAGNOSIS — R05.8 PRODUCTIVE COUGH: ICD-10-CM

## 2023-09-19 LAB — SARS-COV-2 RNA RESP QL NAA+PROBE: NOT DETECTED

## 2023-09-19 PROCEDURE — 99214 OFFICE O/P EST MOD 30 MIN: CPT

## 2023-09-19 PROCEDURE — 94640 AIRWAY INHALATION TREATMENT: CPT

## 2023-09-19 PROCEDURE — 71046 X-RAY EXAM CHEST 2 VIEWS: CPT | Performed by: PHYSICIAN ASSISTANT

## 2023-09-19 RX ORDER — PREDNISONE 20 MG/1
60 TABLET ORAL ONCE
Status: COMPLETED | OUTPATIENT
Start: 2023-09-19 | End: 2023-09-19

## 2023-09-19 RX ORDER — GUAIFENESIN AND CODEINE PHOSPHATE 100; 10 MG/5ML; MG/5ML
10 SOLUTION ORAL EVERY 8 HOURS PRN
Qty: 200 ML | Refills: 0 | Status: SHIPPED | OUTPATIENT
Start: 2023-09-19

## 2023-09-19 RX ORDER — BENZONATATE 200 MG/1
200 CAPSULE ORAL 3 TIMES DAILY PRN
Qty: 30 CAPSULE | Refills: 0 | Status: SHIPPED | OUTPATIENT
Start: 2023-09-19

## 2023-09-19 RX ORDER — PREDNISONE 20 MG/1
40 TABLET ORAL DAILY
Qty: 10 TABLET | Refills: 0 | Status: SHIPPED | OUTPATIENT
Start: 2023-09-19 | End: 2023-09-24

## 2023-09-19 RX ORDER — DOXYCYCLINE HYCLATE 100 MG/1
100 CAPSULE ORAL 2 TIMES DAILY
Qty: 20 CAPSULE | Refills: 0 | Status: SHIPPED | OUTPATIENT
Start: 2023-09-19 | End: 2023-09-29

## 2023-09-19 RX ORDER — IPRATROPIUM BROMIDE AND ALBUTEROL SULFATE 2.5; .5 MG/3ML; MG/3ML
3 SOLUTION RESPIRATORY (INHALATION) ONCE
Status: COMPLETED | OUTPATIENT
Start: 2023-09-19 | End: 2023-09-19

## 2023-09-19 RX ORDER — BENZOCAINE/MENTH/CETYLPYRD CL 15 MG-2 MG
1 LOZENGE MUCOUS MEMBRANE EVERY 6 HOURS PRN
Qty: 20 LOZENGE | Refills: 0 | Status: SHIPPED | OUTPATIENT
Start: 2023-09-19

## 2023-09-19 NOTE — ED INITIAL ASSESSMENT (HPI)
Pt presents to the IC with c/o nasal congestion, sinus pressure, SOB, hoarse voice and productive cough since last week. Pt has been using Robitussin, Zyrtec, flonase and her albuterol inhaler.

## 2023-09-20 ENCOUNTER — TELEPHONE (OUTPATIENT)
Dept: PULMONOLOGY | Facility: CLINIC | Age: 43
End: 2023-09-20

## 2023-09-21 ENCOUNTER — HOSPITAL ENCOUNTER (OUTPATIENT)
Dept: ULTRASOUND IMAGING | Facility: HOSPITAL | Age: 43
Discharge: HOME | End: 2023-09-21
Attending: STUDENT IN AN ORGANIZED HEALTH CARE EDUCATION/TRAINING PROGRAM
Payer: COMMERCIAL

## 2023-09-21 DIAGNOSIS — G57.82 NEUROMA OF THIRD INTERSPACE OF LEFT FOOT: ICD-10-CM

## 2023-09-21 DIAGNOSIS — M79.672 LEFT FOOT PAIN: ICD-10-CM

## 2023-09-21 PROCEDURE — 76881 US COMPL JOINT R-T W/IMG: CPT | Performed by: STUDENT IN AN ORGANIZED HEALTH CARE EDUCATION/TRAINING PROGRAM

## 2023-09-26 ENCOUNTER — OFFICE VISIT (OUTPATIENT)
Dept: PULMONOLOGY | Facility: CLINIC | Age: 43
End: 2023-09-26

## 2023-09-26 VITALS
DIASTOLIC BLOOD PRESSURE: 64 MMHG | SYSTOLIC BLOOD PRESSURE: 114 MMHG | WEIGHT: 212 LBS | HEIGHT: 68 IN | BODY MASS INDEX: 32.13 KG/M2 | OXYGEN SATURATION: 99 % | HEART RATE: 96 BPM

## 2023-09-26 DIAGNOSIS — J45.20 MILD INTERMITTENT ASTHMA WITHOUT COMPLICATION: Primary | ICD-10-CM

## 2023-09-26 PROCEDURE — 3008F BODY MASS INDEX DOCD: CPT | Performed by: INTERNAL MEDICINE

## 2023-09-26 PROCEDURE — 3078F DIAST BP <80 MM HG: CPT | Performed by: INTERNAL MEDICINE

## 2023-09-26 PROCEDURE — 3074F SYST BP LT 130 MM HG: CPT | Performed by: INTERNAL MEDICINE

## 2023-09-26 PROCEDURE — 99213 OFFICE O/P EST LOW 20 MIN: CPT | Performed by: INTERNAL MEDICINE

## 2023-09-26 RX ORDER — ALBUTEROL SULFATE 2.5 MG/3ML
2.5 SOLUTION RESPIRATORY (INHALATION) EVERY 6 HOURS PRN
Qty: 90 EACH | Refills: 5 | Status: SHIPPED | OUTPATIENT
Start: 2023-09-26

## 2023-09-26 RX ORDER — PREDNISONE 20 MG/1
TABLET ORAL
Qty: 10 TABLET | Refills: 0 | Status: SHIPPED | OUTPATIENT
Start: 2023-09-26

## 2023-09-26 RX ORDER — FLUTICASONE FUROATE AND VILANTEROL 100; 25 UG/1; UG/1
1 POWDER RESPIRATORY (INHALATION) DAILY
Qty: 1 EACH | Refills: 5 | Status: SHIPPED | OUTPATIENT
Start: 2023-09-26

## 2023-09-26 RX ORDER — CODEINE PHOSPHATE AND GUAIFENESIN 10; 100 MG/5ML; MG/5ML
5 SOLUTION ORAL EVERY 6 HOURS PRN
Qty: 120 ML | Refills: 0 | Status: SHIPPED | OUTPATIENT
Start: 2023-09-26

## 2023-09-26 NOTE — PROGRESS NOTES
The patient is a 47-year-old female who I know from prior evaluation comes in now for follow-up. She has had a chronic cough which she felt was exacerbated by the [address] [place] [address] fires and the poor air quality index over the summer. Syndrome is lingering. She was evaluated in urgent care center recently and was given doxycycline prednisone and a cough medication. She is somewhat improved but still coughing almost constantly. Review of Systems:  Vision normal. Ear nose and throat normal. Bowel normal. Bladder function normal. No depression. No thyroid disease. No lymphatic system concerns. No rash. Muscles and joints unremarkable. No weight loss no weight gain. Physical Examination:  Vital signs normal. HEENT examination is unremarkable with pupils equal round and reactive to light and accommodation. Neck without adenopathy, thyromegaly, JVD nor bruit. Lungs clear to auscultation and percussion. Cardiac regular rate and rhythm no murmur. Abdomen nontender, without hepatosplenomegaly and no mass appreciable. Extremities and Musculoskeletal without clubbing cyanosis nor edema, and mobility acceptable. Neurologic grossly intact with symmetric tone and strength and reflex. Assessment and plan:  1. Chronic cough-asthmatic bronchitis. The chest x-ray is normal.    Recommendations: Breo, Cheratussin, short course prednisone, patient will call me in 1 to 2 weeks to communicate and contact me promptly if worse problems in the meantime. Patient should plan on seeing me again at the 6-month interval or sooner if needed.

## 2023-10-02 ENCOUNTER — PATIENT MESSAGE (OUTPATIENT)
Dept: PULMONOLOGY | Facility: CLINIC | Age: 43
End: 2023-10-02

## 2023-10-03 ENCOUNTER — TELEPHONE (OUTPATIENT)
Dept: PULMONOLOGY | Facility: CLINIC | Age: 43
End: 2023-10-03

## 2023-10-03 NOTE — TELEPHONE ENCOUNTER
Dr Elza Hayes- please see patient's most recent message. She hasn't picked up the [facility] you prescribed due to it's price. She had Spiriva Respimat 2.5mcg inh 4gm 60 metered inhalations sent to her by accident due to old prescription. The spiriva she has  dec 2023. Could she use the spiriva instead of breo for the meantime?

## 2023-10-03 NOTE — TELEPHONE ENCOUNTER
----- Message from [address] [place] [address] sent at 10/2/2023  8:31 PM CDT -----  Regarding: Medication question and 1 wk check in  Contact: [phone]  Hi Dr. Elisabeth Lynch,   I have not had a chance  to get the [facility] you prescribed. Partially, it is a bit pricey at the moment. I am wondering, I do have a couple boxes of Spiriva Respimat 2.5mcg inh 4gm 60 metered inhalations They got sent to me by the mail in pharmacy by accident a bit ago, pharmacy initiated the request and sent the request to them even though I never submitted anything all all to either. It is one I have used successfully in the past and since they could not take it back, I still have them. 1 exp 2023 and 3 exp Dec 2023. Would I be able to  use these instead of Breo for the moment. This way they don't go to waste and helps with me since i am already being tight on funds. I know the 1 box has already , but the other 3 are still good til December. Otherwise, so far I am doing ok. Cough is slowly continuing to get better along with the shortness in breath and constriction calming down becoming less bothersome but not fully gone. I do have my voice back again, so very happy   about that. Downside, indigestion and acidy burp hiccups have started up a bit and are quite nauseating. I started taking an over the counter acid releaver which helps a little bit.  Slowly getting there. :)    Thank you,  [address] [place] [address]

## 2023-10-03 NOTE — TELEPHONE ENCOUNTER
Yes can try this Spiriva. She does not have to worry about the expiration date. That stuff for the last the next 50 years.

## 2023-10-04 ENCOUNTER — OFFICE VISIT (OUTPATIENT)
Dept: PODIATRY CLINIC | Facility: CLINIC | Age: 43
End: 2023-10-04

## 2023-10-04 DIAGNOSIS — G57.82 NEUROMA OF THIRD INTERSPACE OF LEFT FOOT: Primary | ICD-10-CM

## 2023-10-04 PROCEDURE — 99213 OFFICE O/P EST LOW 20 MIN: CPT | Performed by: PODIATRIST

## 2023-10-04 NOTE — TELEPHONE ENCOUNTER
See TE 10/3/23. Yes can try this Spiriva. She does not have to worry about the expiration date. That stuff for the last the next 50 years.

## 2023-10-08 NOTE — PROGRESS NOTES
Louise Carrel is a 38year old female. Patient presents with: Foot Pain: 2nd opinion from Dr. Arabella Bond for left foot neuroma and bursitis. Pain 5/10. HPI:   This pleasant patient presents to the clinic as a second opinion from Dr. Benitez Goncalves. They are considering neuroma surgeries because she has not responded to conservative management she has had diagnostic ultrasound showing neuromas in both the second and third intermetatarsal space of the left foot these are painful to walk on it painful in shoe gear. At today's visit reviewed nurse's history as taken above, allergies medications and medical history as documented below. All changes duly noted  Allergies: Benzoyl Peroxide and Ciprofloxacin   Current Outpatient Medications   Medication Sig Dispense Refill    albuterol (2.5 MG/3ML) 0.083% Inhalation Nebu Soln Take 3 mL (2.5 mg total) by nebulization every 6 (six) hours as needed for Wheezing. 90 each 5    fluticasone furoate-vilanterol (BREO ELLIPTA) 100-25 MCG/ACT Inhalation Aerosol Powder, Breath Activated Inhale 1 puff into the lungs daily. Rinse your mouth with water without swallowing after using BREO to help reduce your chance of getting thrush. 1 each 5    predniSONE 20 MG Oral Tab 2 tabs daily for 3 days then one tab daily until completed 10 tablet 0    guaiFENesin-codeine (CHERATUSSIN AC) 100-10 MG/5ML Oral Solution Take 5 mL by mouth every 6 (six) hours as needed for cough. 120 mL 0    Benzocaine-Menthol (CEPACOL SORE THROAT) 10-2.1 MG Mouth/Throat Lozenge Use as directed 1 lozenge in the mouth or throat every 6 (six) hours as needed (sore throat). 20 lozenge 0    benzonatate 200 MG Oral Cap Take 1 capsule (200 mg total) by mouth 3 (three) times daily as needed for cough. 30 capsule 0    guaiFENesin-Codeine 100-10 MG/5ML Oral Syrup Take 10 mL by mouth every 8 (eight) hours as needed (coug/congestion).  200 mL 0    betamethasone dipropionate 0.05 % External Ointment Apply 1 g topically daily as needed (ear itch). 15 g 0    montelukast 10 MG Oral Tab Take 1 tablet (10 mg total) by mouth nightly. 60 tablet 0    Meloxicam 7.5 MG Oral Tab Take 1 tablet (7.5 mg total) by mouth in the morning and 1 tablet (7.5 mg total) before bedtime. 30 tablet 0    Rizatriptan Benzoate 10 MG Oral Tab use at onset; may repeat once after 2 hours- ONLY 2 IN 24 HOUR PERIOD MAX. This is a 30 day supply. 12 tablet 3    fluticasone propionate (FLONASE ALLERGY RELIEF) 50 MCG/ACT Nasal Suspension by Each Nare route daily. Multiple Vitamin (HIGH POTENCY MULTIVITAMIN) Oral Tab Take 1 tablet by mouth daily. 30 tablet 0    albuterol 108 (90 Base) MCG/ACT Inhalation Aero Soln Inhale 2 puffs into the lungs every 6 (six) hours as needed for Wheezing or Shortness of Breath. 1 each 1    albuterol (2.5 MG/3ML) 0.083% Inhalation Nebu Soln INHALE 1 VIAL PO VIA NEBULIZER Q 4 H PRN 1 each 0    CALCIUM CITRATE-VITAMIN D OR Take by mouth. OMEGA-3 FATTY ACIDS OR Take by mouth.       ZYRTEC-D ALLERGY & CONGESTION 5-120 MG Oral Tablet 12 Hr TK 1 T PO 1 TO 2 TIMES A DAY        Past Medical History:   Diagnosis Date    Allergic rhinitis     Asthma     Endometriosis     H/O LEEP 2004    HELLP (hemolytic anemia/elev liver enzymes/low platelets in pregnancy) 2015    History of DVT (deep vein thrombosis)       Past Surgical History:   Procedure Laterality Date    APPENDECTOMY             DELIVERY ONLY  2014    CHOLECYSTECTOMY  2016    HYSTERECTOMY  2015    [facility] ,,    ablation of implants      Family History   Problem Relation Age of Onset    Heart Disorder Father     Hypertension Father     Lipids Father     Cancer Paternal Grandmother     Breast Cancer Other         paternal cousin    Ovarian Cancer Other         paternal cousin    Other (testicular cancer) Other         paternal cousin    Breast Cancer Maternal Cousin Female 36    Ovarian Cancer Maternal Cousin Female 37      Social History    Socioeconomic History      Marital status:     Tobacco Use      Smoking status: Never      Smokeless tobacco: Never    Vaping Use      Vaping Use: Never used    Substance and Sexual Activity      Alcohol use: Yes        Comment: rarely      Drug use: Never      Sexual activity: Not Currently        Partners: Male        Birth control/protection: Hysterectomy    Other Topics      Concerns:        Caffeine Concern: Yes          coffee        Exercise: Yes          REVIEW OF SYSTEMS:   Today reviewed systens as documented below  GENERAL HEALTH: feels well otherwise  SKIN: Refer to exam below  RESPIRATORY: denies shortness of breath with exertion  CARDIOVASCULAR: denies chest pain on exertion  GI: denies abdominal pain and denies heartburn  NEURO: denies headaches    EXAM:   There were no vitals taken for this visit. GENERAL: well developed, well nourished, in no apparent distress  EXTREMITIES:   1. Integument: The skin on her left foot is warm dry and supple. 2. Vascular: The patient has palpable pulses dorsalis pedis and posterior tibial on the left with brisk capillary return to the pedal digits   3. Neurologic: The patient has intact sensorium   4. Musculoskeletal: Patient has pain on palpation of the second and third intermetatarsal space the third seems to be a little bit worse. Diagnostic ultrasound showed that there are neuromas present in both locations but the 1 in the third intermetatarsal space is slightly larger. That 1 is also slightly more symptomatic. 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   I related this to the patient and to Dr. Carrizales Comment. The patient indicates understanding of these issues and agrees to the plan.     Rolanda Busch DPM

## 2023-10-10 ENCOUNTER — OFFICE VISIT (OUTPATIENT)
Dept: OTOLARYNGOLOGY | Facility: CLINIC | Age: 43
End: 2023-10-10

## 2023-10-10 DIAGNOSIS — M26.609 TMJ (TEMPOROMANDIBULAR JOINT DISORDER): ICD-10-CM

## 2023-10-10 DIAGNOSIS — H92.02 POSTERIOR AURICULAR PAIN OF LEFT EAR: Primary | ICD-10-CM

## 2023-10-10 PROCEDURE — 92567 TYMPANOMETRY: CPT | Performed by: STUDENT IN AN ORGANIZED HEALTH CARE EDUCATION/TRAINING PROGRAM

## 2023-10-10 PROCEDURE — 99213 OFFICE O/P EST LOW 20 MIN: CPT | Performed by: STUDENT IN AN ORGANIZED HEALTH CARE EDUCATION/TRAINING PROGRAM

## 2023-10-10 PROCEDURE — 92504 EAR MICROSCOPY EXAMINATION: CPT | Performed by: STUDENT IN AN ORGANIZED HEALTH CARE EDUCATION/TRAINING PROGRAM

## 2023-10-10 RX ORDER — MELOXICAM 15 MG/1
15 TABLET ORAL NIGHTLY
Qty: 30 TABLET | Refills: 0 | Status: SHIPPED | OUTPATIENT
Start: 2023-10-10 | End: 2023-11-09

## 2023-10-10 RX ORDER — CYCLOBENZAPRINE HCL 5 MG
5 TABLET ORAL NIGHTLY
Qty: 30 TABLET | Refills: 0 | Status: SHIPPED | OUTPATIENT
Start: 2023-10-10 | End: 2023-11-09

## 2023-10-10 NOTE — PROGRESS NOTES
Eliecer Bee is a 38year old female. Patient presents with:  Ear Problem: Pt reports left ear infection, pressure and pain in ear. No drainage. X 3 weeks       ASSESSMENT AND PLAN:   1. Posterior auricular pain of left ear  38year-old presents with left postauricular pain near the mastoid area. This been going on for few weeks. She had a allergy flare or sinus infection preceding this. She notes some sharp pains in this area. She denies grinding or clenching her teeth. She is missing some left mandibular molars although tries to eat on both sides of her mouth. Denies any hearing issues. Her otologic exam is normal.  No fluid or otitis. I performed tympanograms on each side that were normal.  Missing left mandibular molars. Slight clicking of the left temporal mandibular joint. Reassured her I did not see any fluid or otitis. Rather I think her symptoms may be related to a cervical or TMJ musculoskeletal issue. Multiple muscles attach we will trial a course of meloxicam and Flexeril. Discussed the use and risks of these medications. Discussed conservative measures including heat massage and soft diet. Discussed trying to eat on both sides of her mouth as well. She can return to see me in 3 to 4 weeks if still not improved. To the mastoid bone. 2. TMJ (temporomandibular joint disorder)        The patient indicates understanding of these issues and agrees to the plan. EXAM:   There were no vitals taken for this visit.     Pertinent exam findings may also be noted above in assessment and plan     System Details   Skin Inspection - Normal.   Constitutional Overall appearance - Normal.   Head/Face Symmetric, TMJ tenderness not present    Eyes EOMI, PERRL   Right ear:  Canal clear, TM intact, no JARRET   Left ear:  Canal clear, TM intact, no JARRET   Nose: Septum midline, inferior turbinates not enlarged, nasal valves without collapse    Oral cavity/Oropharynx: No lesions or masses on inspection or palpation, tonsils symmetric    Neck: Soft without LAD, thyroid not enlarged  Voice clear/ no stridor   Other:      Scopes and Procedures:             Current Outpatient Medications   Medication Sig Dispense Refill    cyclobenzaprine 5 MG Oral Tab Take 1 tablet (5 mg total) by mouth nightly. 30 tablet 0    Meloxicam 15 MG Oral Tab Take 1 tablet (15 mg total) by mouth at bedtime. 30 tablet 0    albuterol (2.5 MG/3ML) 0.083% Inhalation Nebu Soln Take 3 mL (2.5 mg total) by nebulization every 6 (six) hours as needed for Wheezing. 90 each 5    fluticasone furoate-vilanterol (BREO ELLIPTA) 100-25 MCG/ACT Inhalation Aerosol Powder, Breath Activated Inhale 1 puff into the lungs daily. Rinse your mouth with water without swallowing after using BREO to help reduce your chance of getting thrush. 1 each 5    Benzocaine-Menthol (CEPACOL SORE THROAT) 10-2.1 MG Mouth/Throat Lozenge Use as directed 1 lozenge in the mouth or throat every 6 (six) hours as needed (sore throat). 20 lozenge 0    betamethasone dipropionate 0.05 % External Ointment Apply 1 g topically daily as needed (ear itch). 15 g 0    montelukast 10 MG Oral Tab Take 1 tablet (10 mg total) by mouth nightly. 60 tablet 0    Meloxicam 7.5 MG Oral Tab Take 1 tablet (7.5 mg total) by mouth in the morning and 1 tablet (7.5 mg total) before bedtime. 30 tablet 0    Rizatriptan Benzoate 10 MG Oral Tab use at onset; may repeat once after 2 hours- ONLY 2 IN 24 HOUR PERIOD MAX. This is a 30 day supply. 12 tablet 3    fluticasone propionate (FLONASE ALLERGY RELIEF) 50 MCG/ACT Nasal Suspension by Each Nare route daily. Multiple Vitamin (HIGH POTENCY MULTIVITAMIN) Oral Tab Take 1 tablet by mouth daily. 30 tablet 0    albuterol 108 (90 Base) MCG/ACT Inhalation Aero Soln Inhale 2 puffs into the lungs every 6 (six) hours as needed for Wheezing or Shortness of Breath.  1 each 1    albuterol (2.5 MG/3ML) 0.083% Inhalation Nebu Soln INHALE 1 VIAL PO VIA NEBULIZER Q 4 H PRN 1 each 0    CALCIUM CITRATE-VITAMIN D OR Take by mouth. OMEGA-3 FATTY ACIDS OR Take by mouth. ZYRTEC-D ALLERGY & CONGESTION 5-120 MG Oral Tablet 12 Hr TK 1 T PO 1 TO 2 TIMES A DAY      predniSONE 20 MG Oral Tab 2 tabs daily for 3 days then one tab daily until completed (Patient not taking: Reported on 10/10/2023) 10 tablet 0    guaiFENesin-codeine (CHERATUSSIN AC) 100-10 MG/5ML Oral Solution Take 5 mL by mouth every 6 (six) hours as needed for cough. (Patient not taking: Reported on 10/10/2023) 120 mL 0    benzonatate 200 MG Oral Cap Take 1 capsule (200 mg total) by mouth 3 (three) times daily as needed for cough. (Patient not taking: Reported on 10/10/2023) 30 capsule 0    guaiFENesin-Codeine 100-10 MG/5ML Oral Syrup Take 10 mL by mouth every 8 (eight) hours as needed (coug/congestion).  (Patient not taking: Reported on 10/10/2023) 200 mL 0      Past Medical History:   Diagnosis Date    Allergic rhinitis     Asthma     Endometriosis 2001    H/O LEEP 2004    HELLP (hemolytic anemia/elev liver enzymes/low platelets in pregnancy) 2015    History of DVT (deep vein thrombosis)       Social History:  Social History     Socioeconomic History    Marital status:    Tobacco Use    Smoking status: Never    Smokeless tobacco: Never   Vaping Use    Vaping Use: Never used   Substance and Sexual Activity    Alcohol use: Yes     Comment: rarely    Drug use: Never    Sexual activity: Not Currently     Partners: Male     Birth control/protection: Hysterectomy   Other Topics Concern    Caffeine Concern Yes     Comment: coffee    Exercise Yes          Ranjit Fung MD  10/10/2023  9:28 AM

## 2023-10-11 ENCOUNTER — OFFICE VISIT (OUTPATIENT)
Dept: PODIATRY CLINIC | Facility: CLINIC | Age: 43
End: 2023-10-11

## 2023-10-11 DIAGNOSIS — M79.672 LEFT FOOT PAIN: ICD-10-CM

## 2023-10-11 DIAGNOSIS — G57.82 NEUROMA OF THIRD INTERSPACE OF LEFT FOOT: Primary | ICD-10-CM

## 2023-10-11 DIAGNOSIS — G57.62 NEUROMA OF SECOND INTERSPACE OF LEFT FOOT: ICD-10-CM

## 2023-10-11 PROCEDURE — 99213 OFFICE O/P EST LOW 20 MIN: CPT | Performed by: STUDENT IN AN ORGANIZED HEALTH CARE EDUCATION/TRAINING PROGRAM

## 2023-10-16 ENCOUNTER — TELEPHONE (OUTPATIENT)
Dept: PODIATRY CLINIC | Facility: CLINIC | Age: 43
End: 2023-10-16

## 2023-10-16 DIAGNOSIS — G57.82 NEUROMA OF THIRD INTERSPACE OF LEFT FOOT: ICD-10-CM

## 2023-10-16 DIAGNOSIS — G57.62 NEUROMA OF SECOND INTERSPACE OF LEFT FOOT: Primary | ICD-10-CM

## 2023-10-16 NOTE — TELEPHONE ENCOUNTER
S/W PATIENT AND GOT SURGERY SCHEDULED FOR 11/2 AT [facility]. AT THIS TIME, PATIENT AND I HAVE GONE OVER PRE OP INSTRUCTIONS AND HAVE BOOKED BOTH POST OP APPOINTMENTS. PATIENT EXPRESSED VERBAL UNDERSTANDING OF THESE INSTRUCTIONS AND WAS ALSO INFORMED THAT THEY WOULD BE AVAILABLE TO VIEW VIA [facility]. AT THIS TIME, MANAGED CARE ORDERS WERE PLACED.

## 2023-10-16 NOTE — TELEPHONE ENCOUNTER
Procedure:   Left 2nd and 3rd interspace nerve decompression    CPT code: 92434 x 2  Length of Surgery: 60 minutes  Any Instruments: podiatry tray  Call patient: ASAP  Anesthesia: Gen  Location: [facility]  Assistance: none  Pacemaker: No  Anticoagulants: No  Nickel Allergy: No  Latex Allergy: No  Diagnosis/ICD Code:   (G57.62) Neuroma of second interspace of left foot  (primary encounter diagnosis)      (G57.82) Neuroma of third interspace of left foot

## 2023-10-17 ENCOUNTER — TELEPHONE (OUTPATIENT)
Dept: PODIATRY CLINIC | Facility: CLINIC | Age: 43
End: 2023-10-17

## 2023-10-17 DIAGNOSIS — G57.62 NEUROMA OF SECOND INTERSPACE OF LEFT FOOT: Primary | ICD-10-CM

## 2023-10-17 DIAGNOSIS — G57.82 NEUROMA OF THIRD INTERSPACE OF LEFT FOOT: ICD-10-CM

## 2023-10-17 NOTE — TELEPHONE ENCOUNTER
Dr. Mihai Moses,     The patient is scheduled for outpatient foot surgery with Dr. Dalton Nugent on 11/28 for the following procedure(s): left 2nd and 3rd interspace nerve decompression. The patient has been advised to contact your office for pre-operative clearance. The patient needs the following test/exams    __X__ History and Physical (H&P) may be no older that THIRTY (30) days prior to surgery    ____ EKG for patients that are age 48 or older, have hypertension, diabetes or a history of cardiac disease or are obese. This should be no older than 6 months prior to surgery. ____ Complete Metabolic Panel (CMP) for patients that are age 72 and older, have hypertension, diabetes or heart problems, are taking any heart medication or are obese. This should be no older that 3 months prior to surgery. Please include any other test you deem necessary for medical clearance.     Thank you,  Brien Hernandez   Surgical Scheduler   [phone]

## 2023-10-18 ENCOUNTER — TELEPHONE (OUTPATIENT)
Dept: INTERNAL MEDICINE CLINIC | Facility: CLINIC | Age: 43
End: 2023-10-18

## 2023-10-18 NOTE — TELEPHONE ENCOUNTER
Patient is requesting an appointment for Pre- OP visit with Dana Barrett. Patient is scheduled for surgery on 11-28.     No current openings within 30 days of surgery.    Ivon Chamberlain is podiatrist.

## 2023-10-30 ENCOUNTER — OFFICE VISIT (OUTPATIENT)
Dept: INTERNAL MEDICINE CLINIC | Facility: CLINIC | Age: 43
End: 2023-10-30

## 2023-10-30 VITALS
DIASTOLIC BLOOD PRESSURE: 66 MMHG | BODY MASS INDEX: 32.13 KG/M2 | HEART RATE: 86 BPM | WEIGHT: 212 LBS | SYSTOLIC BLOOD PRESSURE: 102 MMHG | HEIGHT: 68 IN

## 2023-10-30 DIAGNOSIS — Z01.818 PREOP EXAM FOR INTERNAL MEDICINE: Primary | ICD-10-CM

## 2023-10-30 PROCEDURE — 3008F BODY MASS INDEX DOCD: CPT

## 2023-10-30 PROCEDURE — 3074F SYST BP LT 130 MM HG: CPT

## 2023-10-30 PROCEDURE — 99243 OFF/OP CNSLTJ NEW/EST LOW 30: CPT

## 2023-10-30 PROCEDURE — 3078F DIAST BP <80 MM HG: CPT

## 2023-10-30 RX ORDER — TIOTROPIUM BROMIDE INHALATION SPRAY 3.12 UG/1
SPRAY, METERED RESPIRATORY (INHALATION)
COMMUNITY

## 2023-11-03 ENCOUNTER — LAB ENCOUNTER (OUTPATIENT)
Dept: LAB | Facility: HOSPITAL | Age: 43
End: 2023-11-03
Payer: COMMERCIAL

## 2023-11-03 DIAGNOSIS — Z01.818 PREOP EXAM FOR INTERNAL MEDICINE: ICD-10-CM

## 2023-11-03 LAB
ALBUMIN SERPL-MCNC: 4.1 G/DL (ref 3.2–4.8)
ALBUMIN/GLOB SERPL: 1.6 {RATIO} (ref 1–2)
ALP LIVER SERPL-CCNC: 53 U/L
ALT SERPL-CCNC: 22 U/L
ANION GAP SERPL CALC-SCNC: 4 MMOL/L (ref 0–18)
AST SERPL-CCNC: 17 U/L (ref ?–34)
BILIRUB SERPL-MCNC: 0.6 MG/DL (ref 0.3–1.2)
BUN BLD-MCNC: 9 MG/DL (ref 9–23)
BUN/CREAT SERPL: 12 (ref 10–20)
CALCIUM BLD-MCNC: 9.3 MG/DL (ref 8.7–10.4)
CHLORIDE SERPL-SCNC: 107 MMOL/L (ref 98–112)
CO2 SERPL-SCNC: 27 MMOL/L (ref 21–32)
CREAT BLD-MCNC: 0.75 MG/DL
DEPRECATED RDW RBC AUTO: 39.5 FL (ref 35.1–46.3)
EGFRCR SERPLBLD CKD-EPI 2021: 101 ML/MIN/1.73M2 (ref 60–?)
ERYTHROCYTE [DISTWIDTH] IN BLOOD BY AUTOMATED COUNT: 13.1 % (ref 11–15)
FASTING STATUS PATIENT QL REPORTED: YES
GLOBULIN PLAS-MCNC: 2.5 G/DL (ref 2.8–4.4)
GLUCOSE BLD-MCNC: 89 MG/DL (ref 70–99)
HCT VFR BLD AUTO: 41.1 %
HGB BLD-MCNC: 13.9 G/DL
MCH RBC QN AUTO: 28.2 PG (ref 26–34)
MCHC RBC AUTO-ENTMCNC: 33.8 G/DL (ref 31–37)
MCV RBC AUTO: 83.4 FL
OSMOLALITY SERPL CALC.SUM OF ELEC: 284 MOSM/KG (ref 275–295)
PLATELET # BLD AUTO: 175 10(3)UL (ref 150–450)
POTASSIUM SERPL-SCNC: 4 MMOL/L (ref 3.5–5.1)
PROT SERPL-MCNC: 6.6 G/DL (ref 5.7–8.2)
RBC # BLD AUTO: 4.93 X10(6)UL
SODIUM SERPL-SCNC: 138 MMOL/L (ref 136–145)
WBC # BLD AUTO: 5.5 X10(3) UL (ref 4–11)

## 2023-11-03 PROCEDURE — 36415 COLL VENOUS BLD VENIPUNCTURE: CPT

## 2023-11-03 PROCEDURE — 85027 COMPLETE CBC AUTOMATED: CPT

## 2023-11-03 PROCEDURE — 80053 COMPREHEN METABOLIC PANEL: CPT

## 2023-11-28 ENCOUNTER — ANESTHESIA EVENT (OUTPATIENT)
Dept: SURGERY | Facility: HOSPITAL | Age: 43
End: 2023-11-28
Payer: COMMERCIAL

## 2023-11-28 ENCOUNTER — HOSPITAL ENCOUNTER (OUTPATIENT)
Facility: HOSPITAL | Age: 43
Setting detail: HOSPITAL OUTPATIENT SURGERY
Discharge: HOME | End: 2023-11-28
Attending: STUDENT IN AN ORGANIZED HEALTH CARE EDUCATION/TRAINING PROGRAM | Admitting: STUDENT IN AN ORGANIZED HEALTH CARE EDUCATION/TRAINING PROGRAM
Payer: COMMERCIAL

## 2023-11-28 ENCOUNTER — ANESTHESIA (OUTPATIENT)
Dept: SURGERY | Facility: HOSPITAL | Age: 43
End: 2023-11-28
Payer: COMMERCIAL

## 2023-11-28 VITALS
DIASTOLIC BLOOD PRESSURE: 70 MMHG | RESPIRATION RATE: 18 BRPM | BODY MASS INDEX: 31.83 KG/M2 | TEMPERATURE: 98 F | OXYGEN SATURATION: 98 % | WEIGHT: 210 LBS | SYSTOLIC BLOOD PRESSURE: 113 MMHG | HEIGHT: 68 IN | HEART RATE: 77 BPM

## 2023-11-28 PROCEDURE — 0MQT0ZZ REPAIR LEFT FOOT BURSA AND LIGAMENT, OPEN APPROACH: ICD-10-PCS | Performed by: STUDENT IN AN ORGANIZED HEALTH CARE EDUCATION/TRAINING PROGRAM

## 2023-11-28 RX ORDER — KETOROLAC TROMETHAMINE 15 MG/ML
30 INJECTION, SOLUTION INTRAMUSCULAR; INTRAVENOUS ONCE
Status: COMPLETED | OUTPATIENT
Start: 2023-11-28 | End: 2023-11-28

## 2023-11-28 RX ORDER — GLYCOPYRROLATE 0.2 MG/ML
INJECTION, SOLUTION INTRAMUSCULAR; INTRAVENOUS AS NEEDED
Status: DISCONTINUED | OUTPATIENT
Start: 2023-11-28 | End: 2023-11-28 | Stop reason: SURG

## 2023-11-28 RX ORDER — ONDANSETRON 2 MG/ML
INJECTION INTRAMUSCULAR; INTRAVENOUS AS NEEDED
Status: DISCONTINUED | OUTPATIENT
Start: 2023-11-28 | End: 2023-11-28 | Stop reason: SURG

## 2023-11-28 RX ORDER — HYDROMORPHONE HYDROCHLORIDE 1 MG/ML
0.4 INJECTION, SOLUTION INTRAMUSCULAR; INTRAVENOUS; SUBCUTANEOUS EVERY 5 MIN PRN
Status: DISCONTINUED | OUTPATIENT
Start: 2023-11-28 | End: 2023-11-28

## 2023-11-28 RX ORDER — ACETAMINOPHEN 500 MG
1000 TABLET ORAL ONCE
Status: COMPLETED | OUTPATIENT
Start: 2023-11-28 | End: 2023-11-28

## 2023-11-28 RX ORDER — HYDROCODONE BITARTRATE AND ACETAMINOPHEN 5; 325 MG/1; MG/1
1-2 TABLET ORAL EVERY 4 HOURS PRN
Qty: 20 TABLET | Refills: 0 | Status: SHIPPED | OUTPATIENT
Start: 2023-11-28

## 2023-11-28 RX ORDER — HYDROCODONE BITARTRATE AND ACETAMINOPHEN 5; 325 MG/1; MG/1
1 TABLET ORAL ONCE AS NEEDED
Status: COMPLETED | OUTPATIENT
Start: 2023-11-28 | End: 2023-11-28

## 2023-11-28 RX ORDER — HYDROMORPHONE HYDROCHLORIDE 1 MG/ML
0.2 INJECTION, SOLUTION INTRAMUSCULAR; INTRAVENOUS; SUBCUTANEOUS EVERY 5 MIN PRN
Status: DISCONTINUED | OUTPATIENT
Start: 2023-11-28 | End: 2023-11-28

## 2023-11-28 RX ORDER — METOCLOPRAMIDE HYDROCHLORIDE 5 MG/ML
10 INJECTION INTRAMUSCULAR; INTRAVENOUS ONCE
Status: COMPLETED | OUTPATIENT
Start: 2023-11-28 | End: 2023-11-28

## 2023-11-28 RX ORDER — MORPHINE SULFATE 4 MG/ML
2 INJECTION, SOLUTION INTRAMUSCULAR; INTRAVENOUS EVERY 10 MIN PRN
Status: DISCONTINUED | OUTPATIENT
Start: 2023-11-28 | End: 2023-11-28

## 2023-11-28 RX ORDER — HYDROMORPHONE HYDROCHLORIDE 1 MG/ML
0.6 INJECTION, SOLUTION INTRAMUSCULAR; INTRAVENOUS; SUBCUTANEOUS EVERY 5 MIN PRN
Status: DISCONTINUED | OUTPATIENT
Start: 2023-11-28 | End: 2023-11-28

## 2023-11-28 RX ORDER — CEFAZOLIN SODIUM/WATER 2 G/20 ML
SYRINGE (ML) INTRAVENOUS AS NEEDED
Status: DISCONTINUED | OUTPATIENT
Start: 2023-11-28 | End: 2023-11-28 | Stop reason: SURG

## 2023-11-28 RX ORDER — MIDAZOLAM HYDROCHLORIDE 1 MG/ML
INJECTION INTRAMUSCULAR; INTRAVENOUS AS NEEDED
Status: DISCONTINUED | OUTPATIENT
Start: 2023-11-28 | End: 2023-11-28 | Stop reason: SURG

## 2023-11-28 RX ORDER — FAMOTIDINE 10 MG/ML
20 INJECTION, SOLUTION INTRAVENOUS ONCE
Status: COMPLETED | OUTPATIENT
Start: 2023-11-28 | End: 2023-11-28

## 2023-11-28 RX ORDER — DEXAMETHASONE SODIUM PHOSPHATE 4 MG/ML
VIAL (ML) INJECTION AS NEEDED
Status: DISCONTINUED | OUTPATIENT
Start: 2023-11-28 | End: 2023-11-28 | Stop reason: HOSPADM

## 2023-11-28 RX ORDER — NALOXONE HYDROCHLORIDE 0.4 MG/ML
80 INJECTION, SOLUTION INTRAMUSCULAR; INTRAVENOUS; SUBCUTANEOUS AS NEEDED
Status: DISCONTINUED | OUTPATIENT
Start: 2023-11-28 | End: 2023-11-28

## 2023-11-28 RX ORDER — SODIUM CHLORIDE, SODIUM LACTATE, POTASSIUM CHLORIDE, CALCIUM CHLORIDE 600; 310; 30; 20 MG/100ML; MG/100ML; MG/100ML; MG/100ML
INJECTION, SOLUTION INTRAVENOUS CONTINUOUS
Status: DISCONTINUED | OUTPATIENT
Start: 2023-11-28 | End: 2023-11-28

## 2023-11-28 RX ORDER — DEXAMETHASONE SODIUM PHOSPHATE 4 MG/ML
VIAL (ML) INJECTION AS NEEDED
Status: DISCONTINUED | OUTPATIENT
Start: 2023-11-28 | End: 2023-11-28 | Stop reason: SURG

## 2023-11-28 RX ORDER — METOCLOPRAMIDE 10 MG/1
10 TABLET ORAL ONCE
Status: COMPLETED | OUTPATIENT
Start: 2023-11-28 | End: 2023-11-28

## 2023-11-28 RX ORDER — MORPHINE SULFATE 4 MG/ML
4 INJECTION, SOLUTION INTRAMUSCULAR; INTRAVENOUS EVERY 10 MIN PRN
Status: DISCONTINUED | OUTPATIENT
Start: 2023-11-28 | End: 2023-11-28

## 2023-11-28 RX ORDER — LIDOCAINE HYDROCHLORIDE 20 MG/ML
INJECTION, SOLUTION EPIDURAL; INFILTRATION; INTRACAUDAL; PERINEURAL AS NEEDED
Status: DISCONTINUED | OUTPATIENT
Start: 2023-11-28 | End: 2023-11-28 | Stop reason: SURG

## 2023-11-28 RX ORDER — MORPHINE SULFATE 10 MG/ML
6 INJECTION, SOLUTION INTRAMUSCULAR; INTRAVENOUS EVERY 10 MIN PRN
Status: DISCONTINUED | OUTPATIENT
Start: 2023-11-28 | End: 2023-11-28

## 2023-11-28 RX ORDER — FAMOTIDINE 20 MG/1
20 TABLET, FILM COATED ORAL ONCE
Status: COMPLETED | OUTPATIENT
Start: 2023-11-28 | End: 2023-11-28

## 2023-11-28 RX ADMIN — DEXAMETHASONE SODIUM PHOSPHATE 8 MG: 4 MG/ML VIAL (ML) INJECTION at 07:41:00

## 2023-11-28 RX ADMIN — MIDAZOLAM HYDROCHLORIDE 2 MG: 1 INJECTION INTRAMUSCULAR; INTRAVENOUS at 07:35:00

## 2023-11-28 RX ADMIN — LIDOCAINE HYDROCHLORIDE 80 MG: 20 INJECTION, SOLUTION EPIDURAL; INFILTRATION; INTRACAUDAL; PERINEURAL at 07:39:00

## 2023-11-28 RX ADMIN — GLYCOPYRROLATE 0.2 MG: 0.2 INJECTION, SOLUTION INTRAMUSCULAR; INTRAVENOUS at 07:39:00

## 2023-11-28 RX ADMIN — SODIUM CHLORIDE, SODIUM LACTATE, POTASSIUM CHLORIDE, CALCIUM CHLORIDE: 600; 310; 30; 20 INJECTION, SOLUTION INTRAVENOUS at 08:27:00

## 2023-11-28 RX ADMIN — SODIUM CHLORIDE, SODIUM LACTATE, POTASSIUM CHLORIDE, CALCIUM CHLORIDE: 600; 310; 30; 20 INJECTION, SOLUTION INTRAVENOUS at 07:35:00

## 2023-11-28 RX ADMIN — CEFAZOLIN SODIUM/WATER 2 G: 2 G/20 ML SYRINGE (ML) INTRAVENOUS at 07:42:00

## 2023-11-28 RX ADMIN — ONDANSETRON 4 MG: 2 INJECTION INTRAMUSCULAR; INTRAVENOUS at 07:41:00

## 2023-11-28 NOTE — BRIEF OP NOTE
Pre-Operative Diagnosis: Neuroma of second interspace of left foot [G57.62]  Neuroma of third interspace of left foot [G57.82]     Post-Operative Diagnosis: Neuroma of second interspace of left foot [G57.62]Neuroma of third interspace of left foot [G57.82]      Procedure Performed:   Left 2nd and 3rd interspace nerve decompression    Surgeon(s) and Role:     * Keke Chamberlain DPM - Primary     * Juni Bird - Assisting Surgeon    Assistant(s):        Surgical Findings: consistent with pre op diagnosis     Specimen: none     Estimated Blood Loss: No data recorded    Dictation Number:  See Anthony Ann DPM  11/28/2023  8:17 AM

## 2023-11-28 NOTE — ANESTHESIA PROCEDURE NOTES
Airway  Date/Time: 11/28/2023 7:41 AM  Urgency: Elective      General Information and Staff    Patient location during procedure: OR  Anesthesiologist: Anita Leon MD  Resident/CRNA: Kandis Boles CRNA  Performed: CRNA   Performed by: Kandis Boles CRNA  Authorized by: Anita Leon MD      Indications and Patient Condition  Indications for airway management: anesthesia  Sedation level: deep  Preoxygenated: yes  Patient position: sniffing  Mask difficulty assessment: 0 - not attempted    Final Airway Details  Final airway type: supraglottic airway      Successful airway: classic  Size 4       Number of attempts at approach: 1    Additional Comments  Easy, atraumatic placement of LMA. Dentition, lips and mucosa unchanged.

## 2023-11-29 ENCOUNTER — TELEPHONE (OUTPATIENT)
Dept: PODIATRY CLINIC | Facility: CLINIC | Age: 43
End: 2023-11-29

## 2023-11-29 NOTE — OPERATIVE REPORT
OPERATIVE REPORT     Alexey Gramajo Patient Status:  [facility]    1980 MRN [id number]   Location [facility] Attending No att. providers found   Hosp Day # 0 PCP Gilberto Kapadia MD     Date of Surgery:  2023    Preoperative Diagnosis: Neuroma of second interspace of left foot [G57.62]  Neuroma of third interspace of left foot [G57.82]    Postoperative Diagnosis: same    Primary Surgeon: Yuli Harding DPM    Assistant: Ever Pena DPM    Procedures:   Left 2nd and 3rd interspace nerve decompression     Surgical Findings: Consistent with pre op diagnosis     Anesthesia: General    Complications: none    Implants: 4mg dexamethasone x 2    Specimen: none    Drains: none    Condition: Vital signs stable with CFT intact to digits    Estimated Blood Loss: 5cc    Preoperative history/indications:  Patient is a pleasant 61-year-old female who has been followed in podiatry clinic for painful neuromas to her left second and third interspaces. She has had multiple injections which have provided temporary relief but continues to have pain to site. Ultrasound findings of confirm neuroma to these interspaces. She has been scheduled for nerve decompression at this time. Informed Consent: All treatment options have been discussed with the patient including both conservative and surgical attempts at correction. Potential risks and complications of surgical intervention were discussed at length including but not limited to death, loss of limb, post op pain, swelling, infection, bleeding, reoccurrence, extended healing,  and the possibility of further and future surgery. No guarantees have been made to the patient and the informed consent has been signed. Procedure in detail:  The patient was brought into the operating room and placed on the operating table in the supine position.   A timeout was called in the presence of the anesthesiologist, circulating nurse, scrub tech, and myself to confirm the correct patient, patient's date of birth, procedure, and location of the procedure to be performed. All present were in agreement. A pneumatic tourniquet was placed about the patient's left ankle. Following administration of general anesthesia, a local infiltrative block was administered about the left foot utilizing 20 ccs of a 1:1 mixture of 1% lidocaine plain and 0.5% marcaine plain. The left foot was then scrubbed, prepped, and draped in the usual aseptic manner. An Esmarch bandage was utilized to exsanguinate the lower extremity and the pneumatic tourniquet was inflated to 250 mmHg where it remained for the duration of the procedure. Attention was directed to the left second and third interspaces. A small 3 cm incision was made between the metatarsal heads of each respective interspace. The incisions were deepened via sharp and blunt dissection with care taken to identify and retract vital neurovascular structures. Any bleeders were identified and cauterized as necessary with the Bovie. The deep transverse intermetatarsal ligament was identified and this was sharply released. The underlying nerves were visualized and noted to be mildly hypertrophied but without other concerns to region. The interspaces were successfully decompressed. At this time sites were copiously irrigated. Subcutaneous tissues were reapproximated with 3-0 Vicryl suture. Skin was reapproximated with 4-0 nylon suture. The incision was dressed with betadine soaked adaptic and covered with sterile compressive dressings consisting of gauze, kerlix, and a mildly compressive ACE wrap. The pneumatic ankle tourniquet was then deflated and a prompt hyperemic response was noted to all digits of the left foot. The patient tolerated the procedure and anesthesia well.  she was transferred to the recovery room with VSS and vascular status intact.   Following a period of postoperative monitoring, the patient will be discharged home on the following written and oral postop instructions: keep dressings CDI, avoid excessive ambulation, ice and elevate foot when at rest, wear surgical shoe at all times when ambulating, contact my office for all postoperative f/u care and should any problems arise.       Prasanna Kulkarni DPM   11/29/2023

## 2023-11-29 NOTE — TELEPHONE ENCOUNTER
Patient was called after surgery performed yesterday. She relates that she is doing well and pain is under control. We will follow-up next week as scheduled.

## 2023-11-30 ENCOUNTER — TELEPHONE (OUTPATIENT)
Dept: ORTHOPEDICS CLINIC | Facility: CLINIC | Age: 43
End: 2023-11-30

## 2023-12-06 ENCOUNTER — OFFICE VISIT (OUTPATIENT)
Dept: PODIATRY CLINIC | Facility: CLINIC | Age: 43
End: 2023-12-06

## 2023-12-06 DIAGNOSIS — G57.62 NEUROMA OF SECOND INTERSPACE OF LEFT FOOT: Primary | ICD-10-CM

## 2023-12-06 DIAGNOSIS — Z47.89 ORTHOPEDIC AFTERCARE: ICD-10-CM

## 2023-12-06 DIAGNOSIS — G57.82 NEUROMA OF THIRD INTERSPACE OF LEFT FOOT: ICD-10-CM

## 2023-12-06 DIAGNOSIS — M79.672 LEFT FOOT PAIN: ICD-10-CM

## 2023-12-06 PROCEDURE — 99024 POSTOP FOLLOW-UP VISIT: CPT | Performed by: STUDENT IN AN ORGANIZED HEALTH CARE EDUCATION/TRAINING PROGRAM

## 2023-12-07 NOTE — PROGRESS NOTES
[address] [facility]  Progress Note    Eunice Lion is a 37year old female. Chief Complaint   Patient presents with    Genny Lark op left foot. Pain 3/10 with swelling. No numbness or tingling. HPI:     Patient is a pleasant 68-year-old female presents to clinic for postop visit status post second and third interspace decompression for treatment of neuromas to left foot. She is overall doing well and in minimal pain. She rates her pain at a 3 out of 10. She was doing very well till returned to work and her slightly increased activity exacerbated her symptoms. She denies any recent trauma or injury. Denies any numbness or tingling. No other complaints are mentioned. Allergies: Benzoyl peroxide and Ciprofloxacin   Current Outpatient Medications   Medication Sig Dispense Refill    HYDROcodone-acetaminophen 5-325 MG Oral Tab Take 1-2 tablets by mouth every 4 (four) hours as needed for Pain. 20 tablet 0    Tiotropium Bromide Monohydrate (SPIRIVA RESPIMAT) 2.5 MCG/ACT Inhalation Aero Soln Inhale into the lungs. betamethasone dipropionate 0.05 % External Ointment Apply 1 g topically daily as needed (ear itch). 15 g 0    montelukast 10 MG Oral Tab Take 1 tablet (10 mg total) by mouth nightly. 60 tablet 0    Rizatriptan Benzoate 10 MG Oral Tab use at onset; may repeat once after 2 hours- ONLY 2 IN 24 HOUR PERIOD MAX. This is a 30 day supply. 12 tablet 3    fluticasone propionate (FLONASE ALLERGY RELIEF) 50 MCG/ACT Nasal Suspension by Each Nare route daily. Multiple Vitamin (HIGH POTENCY MULTIVITAMIN) Oral Tab Take 1 tablet by mouth daily. 30 tablet 0    albuterol 108 (90 Base) MCG/ACT Inhalation Aero Soln Inhale 2 puffs into the lungs every 6 (six) hours as needed for Wheezing or Shortness of Breath. 1 each 1    albuterol (2.5 MG/3ML) 0.083% Inhalation Nebu Soln INHALE 1 VIAL PO VIA NEBULIZER Q 4 H PRN 1 each 0    CALCIUM CITRATE-VITAMIN D OR Take by mouth.       OMEGA-3 FATTY ACIDS OR Take by mouth.       ZYRTEC-D ALLERGY & CONGESTION 5-120 MG Oral Tablet 12 Hr TK 1 T PO 1 TO 2 TIMES A DAY        Past Medical History:   Diagnosis Date    Allergic rhinitis     Arm vein blood clot     during pregnancy    Asthma     Endometriosis 2001    H/O LEEP 2004    HELLP (hemolytic anemia/elev liver enzymes/low platelets in pregnancy) 2015    History of DVT (deep vein thrombosis)     Migraines     Visual impairment     glasses      Past Surgical History:   Procedure Laterality Date    APPENDECTOMY  1993    [address]  2014    CHOLECYSTECTOMY  2016    HYSTERECTOMY  2015    [address], 2001,2003,2005    ablation of implants      Family History   Problem Relation Age of Onset    Heart Disorder Father     Hypertension Father     Lipids Father     Cancer Paternal Grandmother     Breast Cancer Other         paternal cousin    Ovarian Cancer Other         paternal cousin    Other (testicular cancer) Other         paternal cousin    Breast Cancer Maternal Cousin Female 36    Ovarian Cancer Maternal Cousin Female 37      Social History     Socioeconomic History    Marital status:    Tobacco Use    Smoking status: Never    Smokeless tobacco: Never   Vaping Use    Vaping Use: Never used   Substance and Sexual Activity    Alcohol use: Yes     Comment: rarely    Drug use: Never    Sexual activity: Not Currently     Partners: Male     Birth control/protection: Hysterectomy   Other Topics Concern    Caffeine Concern Yes     Comment: coffee    Exercise Yes           REVIEW OF SYSTEMS:     Today reviewed systems as documented below  GENERAL HEALTH: feels well otherwise  SKIN: denies any unusual skin lesions or rashes  RESPIRATORY: denies shortness of breath with exertion  CARDIOVASCULAR: denies chest pain on exertion  GI: denies abdominal pain and denies heartburn  NEURO: denies headaches  MUSCULO: denies arthritis, back pain      EXAM:   There were no vitals taken for this visit. GENERAL: well developed, well nourished, in no apparent distress  EXTREMITIES:       1. Integument: Normal skin temperature and turgor. Incisions well coapted with sutures intact. Mild bruising of forefoot. No dehiscence or other concerns. 2. Vascular: Dorsalis pedis two out of four bilateral and posterior tibial pulses two out of   four bilateral, capillary refill normal.  Edema noted to left foot, consistent with postop course. 3. Musculoskeletal: Mild pain on surgical site. Compartments are soft and compressible. Strength testing deferred. 4. Neurological: Normal sharp dull sensation; reflexes normal.        ASSESSMENT AND PLAN:   Diagnoses and all orders for this visit:    Neuroma of second interspace of left foot    Neuroma of third interspace of left foot    Left foot pain    Orthopedic aftercare        Plan:    -status post second and third interspace decompression for treatment of neuromas to left foot.  -Sites inspected--noted to be healing well without concerns. Sites are dressed with Betadine and dry dressing mildly compressive Ace wrap. Should be minimally weightbearing to left foot with postop shoe and continue to elevate regularly. Patient has been a little bit too active this week. -Can take ibuprofen as needed for pain.  -Will follow-up in 1 week for suture removal and reevaluation.  -Educated patient acute signs of infection symptoms DVT and advised patient to seek immediate medical attention if any concerns arise. The patient indicates understanding of these issues and agrees to the plan. Sammuel Heimlich, DPM Dragon speech recognition software was used to prepare this note. Errors in word recognition may occur. Please contact me with any questions/concerns with this note.

## 2023-12-11 ENCOUNTER — TELEPHONE (OUTPATIENT)
Dept: ADMINISTRATIVE | Age: 43
End: 2023-12-11

## 2023-12-11 NOTE — TELEPHONE ENCOUNTER
Disab & RTW forms received in the forms dept. Valid JANNIE/FCR on file.    Logged for processing (1 & 2).

## 2023-12-13 ENCOUNTER — OFFICE VISIT (OUTPATIENT)
Dept: PODIATRY CLINIC | Facility: CLINIC | Age: 43
End: 2023-12-13

## 2023-12-13 DIAGNOSIS — G57.62 NEUROMA OF SECOND INTERSPACE OF LEFT FOOT: Primary | ICD-10-CM

## 2023-12-13 DIAGNOSIS — Z47.89 ORTHOPEDIC AFTERCARE: ICD-10-CM

## 2023-12-13 DIAGNOSIS — G57.82 NEUROMA OF THIRD INTERSPACE OF LEFT FOOT: ICD-10-CM

## 2023-12-13 PROCEDURE — 99024 POSTOP FOLLOW-UP VISIT: CPT | Performed by: STUDENT IN AN ORGANIZED HEALTH CARE EDUCATION/TRAINING PROGRAM

## 2023-12-13 NOTE — PROGRESS NOTES
[facility] [facility] Podiatry  Progress Note    Rigoberto Mathur is a 37year old female. Chief Complaint   Patient presents with    Delores Robert op left foot, sx 11/28/23- pain 1-4/10 worse at the end of the day. HPI:     Patient is a pleasant 43-year-old female presents to clinic for postop visit status post second and third interspace decompression for treatment of neuromas to left foot. She is overall doing well and in minimal pain she is doing well and in minimal pain. Her pain can range from a 1-4 out of 10. It tends to be worse at the end of the day after she has been on her feet. She has been ambulating with postop shoe. No other complaints are mentioned. Allergies: Benzoyl peroxide and Ciprofloxacin   Current Outpatient Medications   Medication Sig Dispense Refill    Tiotropium Bromide Monohydrate (SPIRIVA RESPIMAT) 2.5 MCG/ACT Inhalation Aero Soln Inhale into the lungs. betamethasone dipropionate 0.05 % External Ointment Apply 1 g topically daily as needed (ear itch). 15 g 0    montelukast 10 MG Oral Tab Take 1 tablet (10 mg total) by mouth nightly. 60 tablet 0    Rizatriptan Benzoate 10 MG Oral Tab use at onset; may repeat once after 2 hours- ONLY 2 IN 24 HOUR PERIOD MAX. This is a 30 day supply. 12 tablet 3    fluticasone propionate (FLONASE ALLERGY RELIEF) 50 MCG/ACT Nasal Suspension by Each Nare route daily. Multiple Vitamin (HIGH POTENCY MULTIVITAMIN) Oral Tab Take 1 tablet by mouth daily. 30 tablet 0    albuterol 108 (90 Base) MCG/ACT Inhalation Aero Soln Inhale 2 puffs into the lungs every 6 (six) hours as needed for Wheezing or Shortness of Breath. 1 each 1    albuterol (2.5 MG/3ML) 0.083% Inhalation Nebu Soln INHALE 1 VIAL PO VIA NEBULIZER Q 4 H PRN 1 each 0    CALCIUM CITRATE-VITAMIN D OR Take by mouth. OMEGA-3 FATTY ACIDS OR Take by mouth.       ZYRTEC-D ALLERGY & CONGESTION 5-120 MG Oral Tablet 12 Hr TK 1 T PO 1 TO 2 TIMES A DAY        Past Medical Subjective   Patient ID: Aimee Ponce is a 54 y.o. female who presents for 6 month check up (And follow up labs), twiching (Left eye), foot issuse (Callus forming /), and Annual Exam.  Well Adult Physical   Patient here for a comprehensive physical exam.The patient reports problems - fatigue    Do you take any herbs or supplements that were not prescribed by a doctor? no   Are you taking calcium supplements? yes   Are you taking aspirin daily? no     History:  LMP: No LMP recorded. Patient is postmenopausal.  Menopause at 52 years  Last pap date: 7/2022  Abnormal pap? yes              Review of Systems   Constitutional:  Negative for activity change, appetite change, chills, diaphoresis, fatigue, fever and unexpected weight change.   HENT:  Negative for congestion, ear pain, hearing loss, nosebleeds, postnasal drip, rhinorrhea, sinus pressure, sneezing, sore throat, tinnitus, trouble swallowing and voice change.    Eyes:  Negative for photophobia, pain, discharge, redness, itching and visual disturbance.   Respiratory:  Negative for cough, choking, chest tightness, shortness of breath and wheezing.    Cardiovascular:  Negative for chest pain, palpitations and leg swelling.   Gastrointestinal:  Negative for abdominal distention, abdominal pain, blood in stool, constipation, diarrhea, nausea and vomiting.   Endocrine: Negative for cold intolerance, heat intolerance, polydipsia and polyuria.   Genitourinary:  Negative for dysuria, flank pain, frequency, hematuria and urgency.   Musculoskeletal:  Negative for arthralgias, back pain, joint swelling, myalgias, neck pain and neck stiffness.   Skin:  Negative for rash and wound.   Allergic/Immunologic: Negative for immunocompromised state.   Neurological:  Negative for dizziness, tremors, seizures, syncope, facial asymmetry, speech difficulty, weakness, light-headedness, numbness and headaches.   Hematological:  Negative for adenopathy. Does not bruise/bleed easily.  "  Psychiatric/Behavioral:  Negative for agitation, behavioral problems, confusion, dysphoric mood, hallucinations, self-injury, sleep disturbance and suicidal ideas. The patient is not nervous/anxious.        Objective   /75 (BP Location: Right arm, Patient Position: Sitting, BP Cuff Size: Large adult)   Pulse 59   Temp 36.6 °C (97.9 °F) (Temporal)   Resp 18   Ht 1.651 m (5' 5\")   Wt 93.4 kg (206 lb)   SpO2 95%   BMI 34.28 kg/m²   Physical Exam  Constitutional:       General: She is not in acute distress.     Appearance: She is not ill-appearing or diaphoretic.   HENT:      Head: Normocephalic and atraumatic.      Right Ear: External ear normal.      Left Ear: External ear normal.      Nose: Nose normal. No rhinorrhea.   Eyes:      General: Lids are normal. No scleral icterus.        Right eye: No discharge.         Left eye: No discharge.      Conjunctiva/sclera: Conjunctivae normal.   Cardiovascular:      Rate and Rhythm: Normal rate and regular rhythm.      Pulses: Normal pulses.      Heart sounds: No murmur heard.  Pulmonary:      Effort: Pulmonary effort is normal. No respiratory distress.      Breath sounds: No decreased breath sounds, wheezing, rhonchi or rales.   Abdominal:      General: Bowel sounds are normal. There is no distension.      Palpations: Abdomen is soft. There is no mass.      Tenderness: There is no abdominal tenderness. There is no guarding or rebound.   Musculoskeletal:         General: No swelling, tenderness or deformity.      Cervical back: No rigidity or tenderness.      Right lower leg: No edema.      Left lower leg: No edema.   Lymphadenopathy:      Cervical: No cervical adenopathy.      Upper Body:      Right upper body: No supraclavicular adenopathy.      Left upper body: No supraclavicular adenopathy.   Skin:     General: Skin is warm and dry.      Coloration: Skin is not jaundiced or pale.      Findings: No erythema, lesion or rash.   Neurological:      General: No " History:   Diagnosis Date    Allergic rhinitis     Arm vein blood clot     during pregnancy    Asthma     Endometriosis 2001    H/O LEEP 2004    HELLP (hemolytic anemia/elev liver enzymes/low platelets in pregnancy) 2015    History of DVT (deep vein thrombosis)     Migraines     Visual impairment     glasses      Past Surgical History:   Procedure Laterality Date    APPENDECTOMY  1993    [address]  2014    CHOLECYSTECTOMY  2016    HYSTERECTOMY  2015    [facility] [address] 2001,2003,2005    ablation of implants      Family History   Problem Relation Age of Onset    Heart Disorder Father     Hypertension Father     Lipids Father     Cancer Paternal Grandmother     Breast Cancer Other         paternal cousin    Ovarian Cancer Other         paternal cousin    Other (testicular cancer) Other         paternal cousin    Breast Cancer Maternal Cousin Female 36    Ovarian Cancer Maternal Cousin Female 37      Social History     Socioeconomic History    Marital status:    Tobacco Use    Smoking status: Never    Smokeless tobacco: Never   Vaping Use    Vaping Use: Never used   Substance and Sexual Activity    Alcohol use: Yes     Comment: rarely    Drug use: Never    Sexual activity: Not Currently     Partners: Male     Birth control/protection: Hysterectomy   Other Topics Concern    Caffeine Concern Yes     Comment: coffee    Exercise Yes           REVIEW OF SYSTEMS:     Today reviewed systems as documented below  GENERAL HEALTH: feels well otherwise  SKIN: denies any unusual skin lesions or rashes  RESPIRATORY: denies shortness of breath with exertion  CARDIOVASCULAR: denies chest pain on exertion  GI: denies abdominal pain and denies heartburn  NEURO: denies headaches  MUSCULO: denies arthritis, back pain      EXAM:   There were no vitals taken for this visit. GENERAL: well developed, well nourished, in no apparent distress  EXTREMITIES:       1.  Integument: Normal skin focal deficit present.      Mental Status: She is alert and oriented to person, place, and time.      Sensory: No sensory deficit.      Motor: No weakness or tremor.      Coordination: Coordination normal.      Gait: Gait normal.   Psychiatric:         Mood and Affect: Mood normal. Affect is not inappropriate.         Behavior: Behavior normal.         Assessment/Plan   Problem List Items Addressed This Visit          Circulatory    Essential hypertension    Relevant Orders    Albumin , Urine Random    CBC and Auto Differential    Comprehensive Metabolic Panel    Hemoglobin A1C    Lipid Panel    Magnesium    TSH with reflex to Free T4 if abnormal    Follow Up In [facility] - [facility]       Other    Hyperlipidemia    Relevant Orders    Comprehensive Metabolic Panel    Lipid Panel    TSH with reflex to Free T4 if abnormal    Follow Up In [facility] - [facility]CP     Other Visit Diagnoses       Routine general medical examination at a health care facility    -  Primary    Glucose intolerance (impaired glucose tolerance)        Relevant Orders    Albumin , Urine Random    CBC and Auto Differential    Comprehensive Metabolic Panel    Hemoglobin A1C    Lipid Panel    Magnesium    TSH with reflex to Free T4 if abnormal    Follow Up In [facility] - PCP    Postmenopausal        Relevant Orders    XR DEXA bone density    Follow Up In [facility] - PCP            temperature and turgor. Incisions well coapted with sutures intact. Mild bruising of forefoot, improved. No dehiscence or other concerns. 2. Vascular: Dorsalis pedis two out of four bilateral and posterior tibial pulses two out of   four bilateral, capillary refill normal.  Edema noted to left foot, consistent with postop course. 3. Musculoskeletal: Mild pain on surgical site. Compartments are soft and compressible. Strength testing deferred. 4. Neurological: Normal sharp dull sensation; reflexes normal.        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  -Sites inspected--noted to be healing well without concerns. Sutures removed sterile suture we will get without incident. Site dressed with Betadine and Band-Aid. Starting in 2 days can get surgical site wet. Should not soak or scrub and should gently pat site dry. Okay to slowly return to normal tennis shoes as tolerated. Continue to ice/elevate/rest to help with swelling.  -Educated patient acute signs of infection symptoms DVT and advised patient to seek immediate medical attention if any concerns arise. The patient indicates understanding of these issues and agrees to the plan. RTC 2 weeks. Jazzmine Mike DPM    Dragon speech recognition software was used to prepare this note. Errors in word recognition may occur. Please contact me with any questions/concerns with this note.

## 2024-01-03 ENCOUNTER — OFFICE VISIT (OUTPATIENT)
Dept: PODIATRY CLINIC | Facility: CLINIC | Age: 44
End: 2024-01-03

## 2024-01-03 DIAGNOSIS — Z47.89 ORTHOPEDIC AFTERCARE: ICD-10-CM

## 2024-01-03 DIAGNOSIS — G57.82 NEUROMA OF THIRD INTERSPACE OF LEFT FOOT: ICD-10-CM

## 2024-01-03 DIAGNOSIS — G57.62 NEUROMA OF SECOND INTERSPACE OF LEFT FOOT: Primary | ICD-10-CM

## 2024-01-05 NOTE — TELEPHONE ENCOUNTER
Dr. Chamberlain    **2 forms needing signature**     *The ACKNOWLEDGE button has been moved to the top right ribbon*    Please sign off on form if you agree to: Disab due to Left 2nd and 3rd interspace nerve decompression. Start date 11/28/23-RTW 12/04/23   (place your signature on the first page only)    -From your Inbasket, Highlight the patient and click Chart   -Double click the 12/11/2023 Forms Completion telephone encounter  -Scroll down to the Media section   -Click the blue Hyperlink: Disab Dr Chamberlain 01/05/24 and RTW Dr Chamberlain 01/05/24  -Click Acknowledge located in the top right ribbon/menu   -Drag the mouse into the blank space of the document and a + sign will appear. Left click to   electronically sign the document.     Thank you,      Sherin

## 2024-01-07 NOTE — PROGRESS NOTES
[facility] Podiatry  Progress Note    Isis Gramajo is a 43 year old female.   Chief Complaint   Patient presents with    Post-Op     L foot s/p sx on 11/28/2023- rates pain 1/10 most of the time         HPI:     Patient is a pleasant 43-year-old female presents to clinic for postop visit status post second and third interspace decompression for treatment of neuromas to left foot.  She is overall doing well and in minimal pain she is doing well and in minimal pain.  Her pain can be a little achy and she rates it a 1 out of 10.  Overall she is doing better than she was prior to surgery.  She still gets a little bit of tingling but it is improved.  She is returned to normal shoes, activity, and inserts.  No other complaints are mentioned.         Allergies: Benzoyl peroxide and Ciprofloxacin   Current Outpatient Medications   Medication Sig Dispense Refill    methylPREDNISolone 4 MG Oral Tablet Therapy Pack Take as prescribed on pack 21 tablet 0    clotrimazole-betamethasone 1-0.05 % External Cream Apply topically to affected sites 2x daily 45 g 1    Tiotropium Bromide Monohydrate (SPIRIVA RESPIMAT) 2.5 MCG/ACT Inhalation Aero Soln Inhale into the lungs.      betamethasone dipropionate 0.05 % External Ointment Apply 1 g topically daily as needed (ear itch). 15 g 0    montelukast 10 MG Oral Tab Take 1 tablet (10 mg total) by mouth nightly. 60 tablet 0    Rizatriptan Benzoate 10 MG Oral Tab use at onset; may repeat once after 2 hours- ONLY 2 IN 24 HOUR PERIOD MAX.  This is a 30 day supply. 12 tablet 3    fluticasone propionate (FLONASE ALLERGY RELIEF) 50 MCG/ACT Nasal Suspension by Each Nare route daily.      Multiple Vitamin (HIGH POTENCY MULTIVITAMIN) Oral Tab Take 1 tablet by mouth daily. 30 tablet 0    albuterol 108 (90 Base) MCG/ACT Inhalation Aero Soln Inhale 2 puffs into the lungs every 6 (six) hours as needed for Wheezing or Shortness of Breath. 1 each 1    albuterol (2.5 MG/3ML) 0.083% Inhalation  Nebu Soln INHALE 1 VIAL PO VIA NEBULIZER Q 4 H PRN 1 each 0    CALCIUM CITRATE-VITAMIN D OR Take by mouth.      OMEGA-3 FATTY ACIDS OR Take by mouth.      ZYRTEC-D ALLERGY & CONGESTION 5-120 MG Oral Tablet 12 Hr TK 1 T PO 1 TO 2 TIMES A DAY        Past Medical History:   Diagnosis Date    Allergic rhinitis     Arm vein blood clot     during pregnancy    Asthma     Endometriosis     H/O LEEP     HELLP (hemolytic anemia/elev liver enzymes/low platelets in pregnancy)     History of DVT (deep vein thrombosis)     Migraines     Visual impairment     glasses      Past Surgical History:   Procedure Laterality Date    APPENDECTOMY             DELIVERY ONLY  2014    CHOLECYSTECTOMY  2016    HYSTERECTOMY  2015    LAPAROSCOPY,DIAGNOSTIC  , ,,    ablation of implants      Family History   Problem Relation Age of Onset    Heart Disorder Father     Hypertension Father     Lipids Father     Cancer Paternal Grandmother     Breast Cancer Other         paternal cousin    Ovarian Cancer Other         paternal cousin    Other (testicular cancer) Other         paternal cousin    Breast Cancer Maternal Cousin Female 40    Ovarian Cancer Maternal Cousin Female 43      Social History     Socioeconomic History    Marital status:    Tobacco Use    Smoking status: Never    Smokeless tobacco: Never   Vaping Use    Vaping Use: Never used   Substance and Sexual Activity    Alcohol use: Yes     Comment: rarely    Drug use: Never    Sexual activity: Not Currently     Partners: Male     Birth control/protection: Hysterectomy   Other Topics Concern    Caffeine Concern Yes     Comment: coffee    Exercise Yes           REVIEW OF SYSTEMS:     Today reviewed systems as documented below  GENERAL HEALTH: feels well otherwise  SKIN: denies any unusual skin lesions or rashes  RESPIRATORY: denies shortness of breath with exertion  CARDIOVASCULAR: denies chest pain on exertion  GI: denies abdominal pain  and denies heartburn  NEURO: denies headaches  MUSCULO: denies arthritis, back pain      EXAM:   There were no vitals taken for this visit.  GENERAL: well developed, well nourished, in no apparent distress  EXTREMITIES:       1. Integument: Normal skin temperature and turgor.  Incisions well healed.  2. Vascular: Dorsalis pedis two out of four bilateral and posterior tibial pulses two out of   four bilateral, capillary refill normal.  No edema.   3. Musculoskeletal: No pain noted to interspaces. No strength deficits. Compartments are soft and compressible.   4. Neurological: Normal sharp dull sensation; reflexes normal.        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  -Sites inspected--noted to be healing well without concerns.    -Patient has returned to normal shoes and activity and is mostly doing very well.  She gets a little bit of pain and numbness from time to time but is improved from preoperatively and overall doing well.    -Can continue to increase activity as tolerated, without restrictions.  She should let pain be her guide as she increases activity.  Can ice and rest as needed.    -Can take ibuprofen as needed for pain.    -Patient can be discharged with surgical care.  She should follow-up with any pain, swelling, or other concerns arise moving forward.      RTC PRN.    MEHNAZ Turner speech recognition software was used to prepare this note.  Errors in word recognition may occur.  Please contact me with any questions/concerns with this note.

## 2024-02-14 ENCOUNTER — OFFICE VISIT (OUTPATIENT)
Dept: PODIATRY CLINIC | Facility: CLINIC | Age: 44
End: 2024-02-14
Payer: COMMERCIAL

## 2024-02-14 VITALS — DIASTOLIC BLOOD PRESSURE: 66 MMHG | SYSTOLIC BLOOD PRESSURE: 118 MMHG

## 2024-02-14 DIAGNOSIS — G57.62 NEUROMA OF SECOND INTERSPACE OF LEFT FOOT: ICD-10-CM

## 2024-02-14 DIAGNOSIS — M77.52 BURSITIS OF LEFT FOOT: Primary | ICD-10-CM

## 2024-02-14 DIAGNOSIS — G57.82 NEUROMA OF THIRD INTERSPACE OF LEFT FOOT: ICD-10-CM

## 2024-02-14 DIAGNOSIS — Z47.89 ORTHOPEDIC AFTERCARE: ICD-10-CM

## 2024-02-15 ENCOUNTER — HOSPITAL ENCOUNTER (OUTPATIENT)
Dept: MAMMOGRAPHY | Facility: HOSPITAL | Age: 44
Discharge: HOME | End: 2024-02-15
Attending: NURSE PRACTITIONER
Payer: COMMERCIAL

## 2024-02-15 DIAGNOSIS — R92.30 DENSE BREAST TISSUE ON MAMMOGRAM: ICD-10-CM

## 2024-02-15 DIAGNOSIS — Z12.31 SCREENING MAMMOGRAM FOR BREAST CANCER: ICD-10-CM

## 2024-02-15 PROCEDURE — 77067 SCR MAMMO BI INCL CAD: CPT | Performed by: NURSE PRACTITIONER

## 2024-02-15 PROCEDURE — 77063 BREAST TOMOSYNTHESIS BI: CPT | Performed by: NURSE PRACTITIONER

## 2024-02-18 RX ORDER — DEXAMETHASONE SODIUM PHOSPHATE 4 MG/ML
2 VIAL (ML) INJECTION ONCE
Status: SHIPPED | OUTPATIENT
Start: 2024-02-18

## 2024-02-18 NOTE — PROGRESS NOTES
[facility] Podiatry  Progress Note    Isis Gramajo is a 43 year old female.   Chief Complaint   Patient presents with    Post-Op     Post op left foot with swelling and tightness Pain 1/10 .         HPI:     Patient is a pleasant 43-year-old female presents to clinic for postop visit status post second and third interspace decompression for treatment of neuromas to left foot.  Overall she is doing very well but does have minor pain from time to time that she rates at a 1 out of 10.  Her neuroma sites are doing well but she does have some pain to the outside of her foot where bursitis was noted on preoperative imaging.  She still gets a little bit of swelling and tightness from time to time.  No other complaints are mentioned.          Allergies: Benzoyl peroxide and Ciprofloxacin   Current Outpatient Medications   Medication Sig Dispense Refill    clotrimazole-betamethasone 1-0.05 % External Cream Apply topically to affected sites 2x daily 45 g 1    Tiotropium Bromide Monohydrate (SPIRIVA RESPIMAT) 2.5 MCG/ACT Inhalation Aero Soln Inhale into the lungs.      betamethasone dipropionate 0.05 % External Ointment Apply 1 g topically daily as needed (ear itch). 15 g 0    montelukast 10 MG Oral Tab Take 1 tablet (10 mg total) by mouth nightly. 60 tablet 0    Rizatriptan Benzoate 10 MG Oral Tab use at onset; may repeat once after 2 hours- ONLY 2 IN 24 HOUR PERIOD MAX.  This is a 30 day supply. 12 tablet 3    fluticasone propionate (FLONASE ALLERGY RELIEF) 50 MCG/ACT Nasal Suspension by Each Nare route daily.      Multiple Vitamin (HIGH POTENCY MULTIVITAMIN) Oral Tab Take 1 tablet by mouth daily. 30 tablet 0    albuterol 108 (90 Base) MCG/ACT Inhalation Aero Soln Inhale 2 puffs into the lungs every 6 (six) hours as needed for Wheezing or Shortness of Breath. 1 each 1    albuterol (2.5 MG/3ML) 0.083% Inhalation Nebu Soln INHALE 1 VIAL PO VIA NEBULIZER Q 4 H PRN 1 each 0    CALCIUM CITRATE-VITAMIN D OR Take by  mouth.      OMEGA-3 FATTY ACIDS OR Take by mouth.      ZYRTEC-D ALLERGY & CONGESTION 5-120 MG Oral Tablet 12 Hr TK 1 T PO 1 TO 2 TIMES A DAY      methylPREDNISolone 4 MG Oral Tablet Therapy Pack Take as prescribed on pack 21 tablet 0      Past Medical History:   Diagnosis Date    Allergic rhinitis     Arm vein blood clot     during pregnancy    Asthma     Endometriosis     H/O LEEP     HELLP (hemolytic anemia/elev liver enzymes/low platelets in pregnancy)     History of DVT (deep vein thrombosis)     Migraines     Visual impairment     glasses      Past Surgical History:   Procedure Laterality Date    APPENDECTOMY             DELIVERY ONLY      CHOLECYSTECTOMY  2016    HYSTERECTOMY  2015    LAPAROSCOPY,DIAGNOSTIC  , ,,    ablation of implants      Family History   Problem Relation Age of Onset    Heart Disorder Father     Hypertension Father     Lipids Father     Cancer Paternal Grandmother     Breast Cancer Other         paternal cousin    Ovarian Cancer Other         paternal cousin    Other (testicular cancer) Other         paternal cousin    Breast Cancer Maternal Cousin Female 40    Ovarian Cancer Maternal Cousin Female 43      Social History     Socioeconomic History    Marital status:    Tobacco Use    Smoking status: Never    Smokeless tobacco: Never   Vaping Use    Vaping Use: Never used   Substance and Sexual Activity    Alcohol use: Yes     Comment: rarely    Drug use: Never    Sexual activity: Not Currently     Partners: Male     Birth control/protection: Hysterectomy   Other Topics Concern    Caffeine Concern Yes     Comment: coffee    Exercise Yes           REVIEW OF SYSTEMS:     Today reviewed systems as documented below  GENERAL HEALTH: feels well otherwise  SKIN: denies any unusual skin lesions or rashes  RESPIRATORY: denies shortness of breath with exertion  CARDIOVASCULAR: denies chest pain on exertion  GI: denies abdominal pain and  denies heartburn  NEURO: denies headaches  MUSCULO: denies arthritis, back pain      EXAM:   /66 (BP Location: Right arm)   GENERAL: well developed, well nourished, in no apparent distress  EXTREMITIES:       1. Integument: Normal skin temperature and turgor.  Incisions well healed.  2. Vascular: Dorsalis pedis two out of four bilateral and posterior tibial pulses two out of   four bilateral, capillary refill normal.  No edema.   3. Musculoskeletal: No pain noted to interspaces at sites of nerve decompression. No strength deficits. Compartments are soft and compressible.  There is a little pain between fourth and fifth metatarsals at site of bursitis noted on prior imaging.   4. Neurological: Normal sharp dull sensation; reflexes normal.        ASSESSMENT AND PLAN:   Diagnoses and all orders for this visit:    Bursitis of left foot  -     dexamethasone (Decadron) 4 MG/ML injection 2 mg  -     triamcinolone acetonide (Kenalog-10) 10 mg/mL injection    Neuroma of third interspace of left foot    Neuroma of second interspace of left foot    Orthopedic aftercare        Plan:    -status post second and third interspace decompression for treatment of neuromas to left foot.  -Sites inspected--noted to be healing well without concerns.    -Patient has returned to normal shoes and activity and is mostly doing very well.  She gets a little bit of pain and numbness from time to time but is improved from preoperatively and overall doing well.  Her neuroma symptoms appear resolved but she still does have some pain between her fourth and fifth metatarsals at site of bursitis.  Discussed cortisone injection to site including benefits and risks.  Patient agreeable and written consent was obtained.  After prepping site with alcohol, administer cortisone injection of bursa between fourth and fifth metatarsals consisting of 1 cc 0.5% Marcaine plain, 0.5 cc Kenalog 10, 0.5 cc of dexamethasone.  A small amount of this fluid was  also placed to scar tissue and fourth interspace as well.  Patient Toller injections well and there are no complications.  Sites were dressed with Band-Aid.  -Can continue to increase activity as tolerated, without restrictions.  She should let pain be her guide as she increases activity.  Can ice and rest as needed.    -Can take ibuprofen as needed for pain.    -Patient can be discharged with surgical care.  Overall she appears to be doing very well.  She will notify me of response to injection and we can follow-up as needed.      Román Chamberlain DPM    Dragon speech recognition software was used to prepare this note.  Errors in word recognition may occur.  Please contact me with any questions/concerns with this note.

## 2024-03-18 ENCOUNTER — OFFICE VISIT (OUTPATIENT)
Dept: PULMONOLOGY | Facility: CLINIC | Age: 44
End: 2024-03-18

## 2024-03-18 VITALS
WEIGHT: 216 LBS | HEART RATE: 80 BPM | HEIGHT: 68 IN | DIASTOLIC BLOOD PRESSURE: 70 MMHG | BODY MASS INDEX: 32.74 KG/M2 | SYSTOLIC BLOOD PRESSURE: 104 MMHG | OXYGEN SATURATION: 97 %

## 2024-03-18 DIAGNOSIS — J45.20 MILD INTERMITTENT ASTHMA WITHOUT COMPLICATION (HCC): Primary | ICD-10-CM

## 2024-03-18 DIAGNOSIS — R06.02 SOB (SHORTNESS OF BREATH): ICD-10-CM

## 2024-03-18 PROCEDURE — 99213 OFFICE O/P EST LOW 20 MIN: CPT | Performed by: INTERNAL MEDICINE

## 2024-03-18 RX ORDER — MONTELUKAST SODIUM 10 MG/1
10 TABLET ORAL NIGHTLY
Qty: 90 TABLET | Refills: 3 | Status: SHIPPED | OUTPATIENT
Start: 2024-03-18 | End: 2024-03-18

## 2024-03-18 RX ORDER — MONTELUKAST SODIUM 10 MG/1
10 TABLET ORAL NIGHTLY
Qty: 90 TABLET | Refills: 3 | Status: SHIPPED | OUTPATIENT
Start: 2024-03-18

## 2024-03-18 NOTE — TELEPHONE ENCOUNTER
Patient Comment: I did not realize that our pharmacy was open here at [place]. Would you please send my prescription for Singulair to the [facility] to be filled instead of [facility]. Thank you.     LOV: today  NOV: none  Suggested follow up: 1 year  Last refill: today- different pharmacy

## 2024-03-18 NOTE — PROGRESS NOTES
The patient is a 42-year-old female who I know well from prior evaluation comes in now for follow-up.  In general, she is doing really well now.  She needs a refill on her Singulair.  She uses the Spiriva just once every other day and albuterol as needed.  The cough is improved.    Review of Systems:  Vision normal. Ear nose and throat normal. Bowel normal. Bladder function normal. No depression. No thyroid disease. No lymphatic system concerns.  No rash. Muscles and joints unremarkable. No weight loss no weight gain.    Physical Examination:  Vital signs normal. HEENT examination is unremarkable with pupils equal round and reactive to light and accommodation. Neck without adenopathy, thyromegaly, JVD nor bruit. Lungs clear to auscultation and percussion. Cardiac regular rate and rhythm no murmur. Abdomen nontender, without hepatosplenomegaly and no mass appreciable. Extremities and Musculoskeletal without clubbing cyanosis nor edema, and mobility acceptable. Neurologic grossly intact with symmetric tone and strength and reflex.    Assessment and plan:  1.  Chronic cough with asthmatic bronchitis-doing well clinically.  Chest x-ray normal.  Breo was too expensive.    Recommendations: Continue with current medical regime including Spiriva, Singulair, albuterol, see me again at the 1 year interval or sooner if needed and contact me promptly if new trouble.

## 2024-04-03 ENCOUNTER — TELEPHONE (OUTPATIENT)
Dept: PULMONOLOGY | Facility: CLINIC | Age: 44
End: 2024-04-03

## 2024-04-03 RX ORDER — PREDNISONE 20 MG/1
TABLET ORAL
Qty: 10 TABLET | Refills: 0 | Status: SHIPPED | OUTPATIENT
Start: 2024-04-03

## 2024-04-03 NOTE — TELEPHONE ENCOUNTER
Spoke with patient states symptoms started yesterday, shortness of breath at rest and with activity, chest tightness, cough - congestion in chest, but nothing coming up, hoarse voice, slight nasal congestion.  Denies chest pain, fever.  Using nebulizer q3 hours with little relief.

## 2024-04-03 NOTE — TELEPHONE ENCOUNTER
Sounds like a mild viral infection exacerbating her asthma.  Okay to give a short course of prednisone.

## 2024-04-04 ENCOUNTER — TELEPHONE (OUTPATIENT)
Dept: PULMONOLOGY | Facility: CLINIC | Age: 44
End: 2024-04-04

## 2024-04-04 ENCOUNTER — NURSE TRIAGE (OUTPATIENT)
Dept: INTERNAL MEDICINE CLINIC | Facility: CLINIC | Age: 44
End: 2024-04-04

## 2024-04-04 ENCOUNTER — OFFICE VISIT (OUTPATIENT)
Dept: INTERNAL MEDICINE CLINIC | Facility: CLINIC | Age: 44
End: 2024-04-04

## 2024-04-04 VITALS
HEART RATE: 91 BPM | HEIGHT: 68 IN | DIASTOLIC BLOOD PRESSURE: 73 MMHG | WEIGHT: 212 LBS | BODY MASS INDEX: 32.13 KG/M2 | SYSTOLIC BLOOD PRESSURE: 119 MMHG | OXYGEN SATURATION: 97 %

## 2024-04-04 DIAGNOSIS — J06.9 URTI (ACUTE UPPER RESPIRATORY INFECTION): ICD-10-CM

## 2024-04-04 DIAGNOSIS — J45.31 MILD PERSISTENT ASTHMA WITH ACUTE EXACERBATION (HCC): Primary | ICD-10-CM

## 2024-04-04 PROBLEM — J45.30 MILD PERSISTENT ASTHMA WITHOUT COMPLICATION (HCC): Status: ACTIVE | Noted: 2023-07-20

## 2024-04-04 PROCEDURE — 87880 STREP A ASSAY W/OPTIC: CPT | Performed by: INTERNAL MEDICINE

## 2024-04-04 PROCEDURE — 99213 OFFICE O/P EST LOW 20 MIN: CPT | Performed by: INTERNAL MEDICINE

## 2024-04-04 NOTE — TELEPHONE ENCOUNTER
Action Requested: Summary for Provider     []  Critical Lab, Recommendations Needed  [] Need Additional Advice  []   FYI    []   Need Orders  [] Need Medications Sent to Pharmacy  []  Other     SUMMARY: Per protocol: OV    Future Appointments   Date Time Provider Department Center   4/4/2024  1:00 PM Jose Kelsey MD [facility]     Reason for call: Breathing Problem  Onset: Data Unavailable    Tuesday patient started to have hoarseness and shortness of breath. She has a history of asthma. She has been using her nebulizer. Currently her shortness of breath is mild but sometimes can be moderate. Denies fever or chest pain.     Reason for Disposition   MILD difficulty breathing (e.g., minimal/no SOB at rest, SOB with walking, pulse < 100) of new-onset or worse than normal    Protocols used: Breathing Difficulty-A-OH

## 2024-04-04 NOTE — PROGRESS NOTES
Outpatient Office Note    HPI:     Isis Gramajo is a 43 year old female.  Chief Complaint   Patient presents with    Shortness Of Breath    Hoarseness         3 day(s) ago  No sore throat  Has postnasal drip and runny nose  Has SOB  Hoarseness of voice,   Fever - No, no chills  Cough - Yes, slight, productive - Yes, color - yellow  Sick contacts - Yes, works at the hospital  Travel - No  Other symptoms -   Has hx of asthma  Has been using nebulizer and albuterol inhaler which help.          Current Outpatient Medications   Medication Sig Dispense Refill    montelukast 10 MG Oral Tab Take 1 tablet (10 mg total) by mouth nightly. 90 tablet 3    clotrimazole-betamethasone 1-0.05 % External Cream Apply topically to affected sites 2x daily 45 g 1    Tiotropium Bromide Monohydrate (SPIRIVA RESPIMAT) 2.5 MCG/ACT Inhalation Aero Soln Inhale into the lungs.      betamethasone dipropionate 0.05 % External Ointment Apply 1 g topically daily as needed (ear itch). 15 g 0    Rizatriptan Benzoate 10 MG Oral Tab use at onset; may repeat once after 2 hours- ONLY 2 IN 24 HOUR PERIOD MAX.  This is a 30 day supply. 12 tablet 3    fluticasone propionate (FLONASE ALLERGY RELIEF) 50 MCG/ACT Nasal Suspension by Each Nare route daily.      Multiple Vitamin (HIGH POTENCY MULTIVITAMIN) Oral Tab Take 1 tablet by mouth daily. 30 tablet 0    albuterol 108 (90 Base) MCG/ACT Inhalation Aero Soln Inhale 2 puffs into the lungs every 6 (six) hours as needed for Wheezing or Shortness of Breath. 1 each 1    albuterol (2.5 MG/3ML) 0.083% Inhalation Nebu Soln INHALE 1 VIAL PO VIA NEBULIZER Q 4 H PRN 1 each 0    CALCIUM CITRATE-VITAMIN D OR Take by mouth.      OMEGA-3 FATTY ACIDS OR Take by mouth.      ZYRTEC-D ALLERGY & CONGESTION 5-120 MG Oral Tablet 12 Hr TK 1 T PO 1 TO 2 TIMES A DAY      predniSONE 20 MG Oral Tab Take 2 tablets (40MG) daily for 3 days and then take 1 tablet (20MG) daily for 4 days. (Patient not taking: Reported on 4/4/2024) 10  tablet 0    methylPREDNISolone 4 MG Oral Tablet Therapy Pack Take as prescribed on pack 21 tablet 0      Past Medical History:   Diagnosis Date    Allergic rhinitis     Arm vein blood clot     during pregnancy    Asthma (HCC)     Endometriosis 2001    H/O LEEP 2004    HELLP (hemolytic anemia/elev liver enzymes/low platelets in pregnancy) (HCC) 2015    History of DVT (deep vein thrombosis)     Migraines     Visual impairment     glasses      Past Surgical History:   Procedure Laterality Date    APPENDECTOMY             DELIVERY ONLY  2014    CHOLECYSTECTOMY  2016    HYSTERECTOMY  2015    LAPAROSCOPY,DIAGNOSTIC  , ,,2005    ablation of implants      Social History:  Social History     Socioeconomic History    Marital status:    Tobacco Use    Smoking status: Never    Smokeless tobacco: Never   Vaping Use    Vaping Use: Never used   Substance and Sexual Activity    Alcohol use: Yes     Comment: rarely    Drug use: Never    Sexual activity: Not Currently     Partners: Male     Birth control/protection: Hysterectomy   Other Topics Concern    Caffeine Concern Yes     Comment: coffee    Exercise Yes      Family History   Problem Relation Age of Onset    Heart Disorder Father     Hypertension Father     Lipids Father     Cancer Paternal Grandmother     Breast Cancer Other         paternal cousin    Ovarian Cancer Other         paternal cousin    Other (testicular cancer) Other         paternal cousin    Breast Cancer Maternal Cousin Female 40    Ovarian Cancer Maternal Cousin Female 43      Allergies   Allergen Reactions    Benzoyl Peroxide FACE FLUSHING, HIVES, RASH and SWELLING    Ciprofloxacin HALLUCINATION        REVIEW OF SYSTEMS:   Review of Systems   Review of Systems   Constitutional: Negative for activity change, appetite change and fever.    Cardiovascular: Negative for chest pain.   Gastrointestinal: Negative for abdominal distention, abdominal pain and vomiting.    Genitourinary: Negative for hematuria.   Skin: Negative for wound.   Psychiatric/Behavioral: Negative for behavioral problems.   Wt Readings from Last 5 Encounters:   04/04/24 212 lb (96.2 kg)   03/18/24 216 lb (98 kg)   11/28/23 210 lb (95.3 kg)   10/30/23 212 lb (96.2 kg)   09/26/23 212 lb (96.2 kg)     Body mass index is 32.23 kg/m².      EXAM:   /73   Pulse 91   Ht 5' 8\" (1.727 m)   Wt 212 lb (96.2 kg)   SpO2 97%   BMI 32.23 kg/m²   Physical Exam   Constitutional:       Appearance: Normal appearance.   HENT:      Head: Normocephalic.   Eyes:      Conjunctiva/sclera: Conjunctivae normal.   Cardiovascular:      Rate and Rhythm: Normal rate   Pulmonary:      Effort: Pulmonary effort is normal.      Breath sounds: Normal breath sounds. No rhonchi or rales.   Musculoskeletal:      Cervical back: Neck supple.      Right lower leg: No edema.      Left lower leg: No edema.   Skin:     General: Skin is warm and dry.   Neurological:      General: No focal deficit present.      Mental Status: He is alert and oriented to person, place, and time. Mental status is at baseline.   Psychiatric:         Mood and Affect: Mood normal.         Behavior: Behavior normal.       Health Maintenence:     Health Maintenance Topics with due status: Overdue       Topic Date Due    Colorectal Cancer Screening Never done    Asthma Action Plan Never done    Asthma Control Test Never done    Pneumococcal Vaccine: Birth to 64yrs Never done    DTaP,Tdap,and Td Vaccines Never done    COVID-19 Vaccine 09/01/2023    Annual Physical 09/22/2023    Annual Depression Screening 01/01/2024              ASSESSMENT AND PLAN:   1. Mild persistent asthma with acute exacerbation (HCC)  -Patient's pulmonologist already sent her prescription of prednisone 40 mg for 3 days  -Continue inhalers and as needed nebulizer  Orders:  - POC Rapid Strep [26818]  - SARS-CoV-2/Flu A and B/RSV by PCR ([facility]) [E] *Collect in Office!; Future    2. URTI (acute  upper respiratory infection)  - POC Rapid Strep [37603]  - SARS-CoV-2/Flu A and B/RSV by PCR ([facility]) [E] *Collect in Office!; Future          The patient indicates understanding of these issues and agrees to the plan.  No follow-ups on file.        This note was prepared using Dragon Medical voice recognition dictation software. As a result errors may occur. When identified these errors have been corrected. While every attempt is made to correct errors during dictation discrepancies may still exist.    Jose Kelsey MD

## 2024-04-04 NOTE — TELEPHONE ENCOUNTER
Also see 4/3/2024 TE - patient states pharmacy did not receive Prednisone rx.  Please call.  Thank you.

## 2024-04-04 NOTE — TELEPHONE ENCOUNTER
Spoke to patient-stated prednisone not at pharmacy. Spoke to pharmacy-prescription ready for pickup.   Patient stated a return to work note needed as she has been out for 4 days. Advised patient to request from PCP.

## 2024-04-05 LAB
FLUAV + FLUBV RNA SPEC NAA+PROBE: NOT DETECTED
FLUAV + FLUBV RNA SPEC NAA+PROBE: NOT DETECTED
RSV RNA SPEC NAA+PROBE: NOT DETECTED
SARS-COV-2 RNA RESP QL NAA+PROBE: NOT DETECTED

## 2024-04-10 ENCOUNTER — TELEPHONE (OUTPATIENT)
Dept: INTERNAL MEDICINE CLINIC | Facility: CLINIC | Age: 44
End: 2024-04-10

## 2024-04-10 NOTE — TELEPHONE ENCOUNTER
Dr. Kelsey: patient asking if you can provide another round of steroids.     LOV 4/4/24    Patient taking last prednisone dose today.   Coughing increased.   Not a lot of phlegm. Still feels sob.  She is Doing inhalers and nebulizer treatments. Takes montelukast.     Hx Asthma

## 2024-04-15 ENCOUNTER — HOSPITAL ENCOUNTER (EMERGENCY)
Facility: HOSPITAL | Age: 44
Discharge: HOME | End: 2024-04-15
Payer: COMMERCIAL

## 2024-04-15 ENCOUNTER — APPOINTMENT (OUTPATIENT)
Dept: GENERAL RADIOLOGY | Facility: HOSPITAL | Age: 44
End: 2024-04-15
Attending: NURSE PRACTITIONER
Payer: COMMERCIAL

## 2024-04-15 VITALS
RESPIRATION RATE: 18 BRPM | TEMPERATURE: 99 F | DIASTOLIC BLOOD PRESSURE: 89 MMHG | OXYGEN SATURATION: 98 % | HEIGHT: 68 IN | WEIGHT: 215 LBS | HEART RATE: 107 BPM | SYSTOLIC BLOOD PRESSURE: 139 MMHG | BODY MASS INDEX: 32.58 KG/M2

## 2024-04-15 DIAGNOSIS — J98.01 ACUTE BRONCHOSPASM: Primary | ICD-10-CM

## 2024-04-15 LAB
ANION GAP SERPL CALC-SCNC: 7 MMOL/L (ref 0–18)
ATRIAL RATE: 105 BPM
BASOPHILS # BLD AUTO: 0.05 X10(3) UL (ref 0–0.2)
BASOPHILS NFR BLD AUTO: 0.6 %
BUN BLD-MCNC: 14 MG/DL (ref 9–23)
BUN/CREAT SERPL: 14.1 (ref 10–20)
CALCIUM BLD-MCNC: 9.5 MG/DL (ref 8.7–10.4)
CHLORIDE SERPL-SCNC: 108 MMOL/L (ref 98–112)
CO2 SERPL-SCNC: 22 MMOL/L (ref 21–32)
CREAT BLD-MCNC: 0.99 MG/DL
D DIMER PPP FEU-MCNC: <0.27 UG/ML FEU (ref ?–0.5)
DEPRECATED RDW RBC AUTO: 41.3 FL (ref 35.1–46.3)
EGFRCR SERPLBLD CKD-EPI 2021: 72 ML/MIN/1.73M2 (ref 60–?)
EOSINOPHIL # BLD AUTO: 0.12 X10(3) UL (ref 0–0.7)
EOSINOPHIL NFR BLD AUTO: 1.5 %
ERYTHROCYTE [DISTWIDTH] IN BLOOD BY AUTOMATED COUNT: 13.7 % (ref 11–15)
GLUCOSE BLD-MCNC: 97 MG/DL (ref 70–99)
HCT VFR BLD AUTO: 41.7 %
HGB BLD-MCNC: 14.6 G/DL
IMM GRANULOCYTES # BLD AUTO: 0.04 X10(3) UL (ref 0–1)
IMM GRANULOCYTES NFR BLD: 0.5 %
LYMPHOCYTES # BLD AUTO: 2.41 X10(3) UL (ref 1–4)
LYMPHOCYTES NFR BLD AUTO: 29.9 %
MCH RBC QN AUTO: 29 PG (ref 26–34)
MCHC RBC AUTO-ENTMCNC: 35 G/DL (ref 31–37)
MCV RBC AUTO: 82.7 FL
MONOCYTES # BLD AUTO: 0.42 X10(3) UL (ref 0.1–1)
MONOCYTES NFR BLD AUTO: 5.2 %
NEUTROPHILS # BLD AUTO: 5.03 X10 (3) UL (ref 1.5–7.7)
NEUTROPHILS # BLD AUTO: 5.03 X10(3) UL (ref 1.5–7.7)
NEUTROPHILS NFR BLD AUTO: 62.3 %
OSMOLALITY SERPL CALC.SUM OF ELEC: 284 MOSM/KG (ref 275–295)
P AXIS: 61 DEGREES
P-R INTERVAL: 130 MS
PLATELET # BLD AUTO: 221 10(3)UL (ref 150–450)
POTASSIUM SERPL-SCNC: 3.8 MMOL/L (ref 3.5–5.1)
Q-T INTERVAL: 328 MS
QRS DURATION: 74 MS
QTC CALCULATION (BEZET): 433 MS
R AXIS: 55 DEGREES
RBC # BLD AUTO: 5.04 X10(6)UL
SODIUM SERPL-SCNC: 137 MMOL/L (ref 136–145)
T AXIS: 58 DEGREES
TROPONIN I SERPL HS-MCNC: <3 NG/L
VENTRICULAR RATE: 105 BPM
WBC # BLD AUTO: 8.1 X10(3) UL (ref 4–11)

## 2024-04-15 PROCEDURE — 85025 COMPLETE CBC W/AUTO DIFF WBC: CPT | Performed by: NURSE PRACTITIONER

## 2024-04-15 PROCEDURE — 99284 EMERGENCY DEPT VISIT MOD MDM: CPT

## 2024-04-15 PROCEDURE — 80048 BASIC METABOLIC PNL TOTAL CA: CPT | Performed by: NURSE PRACTITIONER

## 2024-04-15 PROCEDURE — 85379 FIBRIN DEGRADATION QUANT: CPT | Performed by: NURSE PRACTITIONER

## 2024-04-15 PROCEDURE — 84484 ASSAY OF TROPONIN QUANT: CPT | Performed by: NURSE PRACTITIONER

## 2024-04-15 PROCEDURE — 71045 X-RAY EXAM CHEST 1 VIEW: CPT | Performed by: NURSE PRACTITIONER

## 2024-04-15 PROCEDURE — 94640 AIRWAY INHALATION TREATMENT: CPT

## 2024-04-15 PROCEDURE — 93010 ELECTROCARDIOGRAM REPORT: CPT

## 2024-04-15 PROCEDURE — 36415 COLL VENOUS BLD VENIPUNCTURE: CPT

## 2024-04-15 PROCEDURE — 94645 CONT INHLJ TX EACH ADDL HOUR: CPT

## 2024-04-15 PROCEDURE — 93005 ELECTROCARDIOGRAM TRACING: CPT

## 2024-04-15 RX ORDER — ALBUTEROL SULFATE 90 UG/1
2 AEROSOL, METERED RESPIRATORY (INHALATION) EVERY 4 HOURS PRN
Qty: 1 EACH | Refills: 0 | Status: SHIPPED | OUTPATIENT
Start: 2024-04-15 | End: 2024-05-15

## 2024-04-15 RX ORDER — ALBUTEROL SULFATE 2.5 MG/3ML
10 SOLUTION RESPIRATORY (INHALATION) ONCE
Status: DISCONTINUED | OUTPATIENT
Start: 2024-04-15 | End: 2024-04-15

## 2024-04-15 RX ORDER — ALBUTEROL SULFATE 2.5 MG/3ML
10 SOLUTION RESPIRATORY (INHALATION) ONCE
Status: COMPLETED | OUTPATIENT
Start: 2024-04-15 | End: 2024-04-15

## 2024-04-15 RX ORDER — CODEINE PHOSPHATE AND GUAIFENESIN 10; 100 MG/5ML; MG/5ML
5 SOLUTION ORAL EVERY 6 HOURS PRN
Qty: 120 ML | Refills: 0 | Status: SHIPPED | OUTPATIENT
Start: 2024-04-15 | End: 2024-04-22

## 2024-04-15 RX ORDER — PREDNISONE 20 MG/1
40 TABLET ORAL DAILY
Qty: 10 TABLET | Refills: 0 | Status: SHIPPED | OUTPATIENT
Start: 2024-04-15 | End: 2024-04-20

## 2024-04-15 RX ORDER — PREDNISONE 20 MG/1
60 TABLET ORAL ONCE
Status: COMPLETED | OUTPATIENT
Start: 2024-04-15 | End: 2024-04-15

## 2024-04-15 NOTE — ED INITIAL ASSESSMENT (HPI)
Patient to triage via wheelchair, c/o asthma exacerbation. Patient c/o feeling short of breath and chest pain from coughing. Gave herself a neb with no relief. Vital signs stable in triage. Patient able to speak full sentences, coughing intermittently with a dry cough.

## 2024-04-15 NOTE — TELEPHONE ENCOUNTER
Dr Kelsey, patient called back to check, see message below, still has cough and wheeze, works at [employer] ([id number]).  Please advise on refill of steroids, they did help the first time she used them.

## 2024-04-15 NOTE — ED PROVIDER NOTES
Patient Seen in: [facility] Emergency Department      History     Chief Complaint   Patient presents with    Chest Pain Angina    Difficulty Breathing     Stated Complaint: Asthma, chest pain and cough    Subjective:   43yof w hx of chronic bronchitis/asthma reports with chest pain, cough, dyspnea. Worsening x 2 weeks. Much worse today. No abd pain. No urinary symptoms. Dry cough. Sees Jennifer. No hemoptysis. Hx of provoked DVT. Not anticoagulated            Objective:   No pertinent past medical history.            No pertinent past surgical history.              No pertinent social history.            Review of Systems    Positive for stated complaint: Asthma, chest pain and cough  Other systems are as noted in HPI.  Constitutional and vital signs reviewed.      All other systems reviewed and negative except as noted above.    Physical Exam     ED Triage Vitals [04/15/24 1402]   /84   Pulse 105   Resp 22   Temp 99.1 °F (37.3 °C)   Temp src Oral   SpO2 98 %   O2 Device None (Room air)       Current:/84   Pulse 105   Temp 99.1 °F (37.3 °C) (Oral)   Resp 22   Ht 172.7 cm (5' 8\")   Wt 97.5 kg   SpO2 98%   BMI 32.69 kg/m²         Physical Exam  Vitals and nursing note reviewed.   Constitutional:       General: She is not in acute distress.     Appearance: She is well-developed.   HENT:      Head: Normocephalic and atraumatic.      Nose: Nose normal.      Mouth/Throat:      Mouth: Mucous membranes are moist.   Eyes:      Conjunctiva/sclera: Conjunctivae normal.      Pupils: Pupils are equal, round, and reactive to light.   Cardiovascular:      Rate and Rhythm: Normal rate and regular rhythm.      Heart sounds: Normal heart sounds.   Pulmonary:      Effort: Pulmonary effort is normal.      Breath sounds: Examination of the right-middle field reveals wheezing. Examination of the left-middle field reveals wheezing. Examination of the right-lower field reveals wheezing. Examination of the  left-lower field reveals wheezing. Wheezing present.   Abdominal:      General: Bowel sounds are normal.      Palpations: Abdomen is soft.   Musculoskeletal:         General: No tenderness or deformity. Normal range of motion.      Cervical back: Normal range of motion and neck supple.   Skin:     General: Skin is warm and dry.      Capillary Refill: Capillary refill takes less than 2 seconds.      Findings: No rash.      Comments: Normal color   Neurological:      General: No focal deficit present.      Mental Status: She is alert and oriented to person, place, and time.      GCS: GCS eye subscore is 4. GCS verbal subscore is 5. GCS motor subscore is 6.      Cranial Nerves: No cranial nerve deficit.      Gait: Gait normal.               ED Course     Labs Reviewed   BASIC METABOLIC PANEL (8) - Normal   TROPONIN I HIGH SENSITIVITY - Normal   D-DIMER - Normal   CBC WITH DIFFERENTIAL WITH PLATELET    Narrative:     The following orders were created for panel order CBC With Differential With Platelet.  Procedure                               Abnormality         Status                     ---------                               -----------         ------                     CBC W/ DIFFERENTIAL[[id number]]                              Final result                 Please view results for these tests on the individual orders.   CBC W/ DIFFERENTIAL     EKG    Rate, intervals and axes as noted on EKG Report.  Rate: 105  Rhythm: Sinus Rhythm  Reading: Nsr no lindsey no ectopy  Stable clinical condition                   Impression  PROCEDURE: XR CHEST AP PORTABLE  (CPT=71045)  TIME: 1608     COMPARISON: [facility], XR CHEST AP PORTABLE (CPT=71045), 7/17/2023, 2:28 PM.     INDICATIONS: Asthma, chest pain and cough     TECHNIQUE:   Single view.       Findings and impression:  Normal heart size, clear lungs, normal pleura             Dictated by (CST): Arsalan Rogers MD on 4/15/2024 at 5:59 PM      Finalized by (CST):  Arsalan Rogers MD on 4/15/2024 at 5:59 PM                                    MDM                         Medical Decision Making  42yo/f w hx and exam as stated; asthma    Non toxic, stable  Non hypoxic  Tolerating po  Neg dimer  Neg trop  Cxr wnl  Improved w 2 x cont nebs  No fever  No head, neck, back pain  Trop neg    Plan  Dc to home  Pred  Albuterol  Close fu w Dr. Rodriguez    Amount and/or Complexity of Data Reviewed  Labs:  Decision-making details documented in ED Course.  Radiology:  Decision-making details documented in ED Course.    Risk  OTC drugs.  Prescription drug management.        Disposition and Plan     Clinical Impression:  1. Acute bronchospasm         Disposition:  Discharge  4/15/2024  7:08 pm    Follow-up:  Merrick Rodriguez MD  [facility] [address] [id number] [phone]    Follow up in 2 day(s)            Medications Prescribed:  Current Discharge Medication List        START taking these medications    Details   !! albuterol 108 (90 Base) MCG/ACT Inhalation Aero Soln Inhale 2 puffs into the lungs every 4 (four) hours as needed.  Qty: 1 each, Refills: 0      !! predniSONE 20 MG Oral Tab Take 2 tablets (40 mg total) by mouth daily for 5 days.  Qty: 10 tablet, Refills: 0       !! - Potential duplicate medications found. Please discuss with provider.

## 2024-04-16 ENCOUNTER — TELEPHONE (OUTPATIENT)
Dept: PULMONOLOGY | Facility: CLINIC | Age: 44
End: 2024-04-16

## 2024-04-16 NOTE — TELEPHONE ENCOUNTER
Patient  was seen in the ER  on 4/15 and Prednisone was prescribed     Patient has sent a  message to Dr Merrick Rodriguez    Discharge instructions were to f/u with Pulmonology    Closing this encounter.

## 2024-04-16 NOTE — ED QUICK NOTES
Patient cleared for discharge by provider. Belongings with patient. IV removed. Discharge instructions provided including when and how to follow up with health care provider and when to seek medical treatment. Medication use and prescriptions reviewed. Patient left ER in stable condition.

## 2024-04-16 NOTE — TELEPHONE ENCOUNTER
Patient was seen in the emergency room yesterday.    I would not have refilled the prednisone course without seeing the patient.  Patient should have been advised to schedule a visit.

## 2024-04-17 ENCOUNTER — TELEPHONE (OUTPATIENT)
Dept: INTERNAL MEDICINE CLINIC | Facility: CLINIC | Age: 44
End: 2024-04-17

## 2024-04-17 ENCOUNTER — OFFICE VISIT (OUTPATIENT)
Dept: INTERNAL MEDICINE CLINIC | Facility: CLINIC | Age: 44
End: 2024-04-17
Payer: COMMERCIAL

## 2024-04-17 VITALS
SYSTOLIC BLOOD PRESSURE: 117 MMHG | BODY MASS INDEX: 32.43 KG/M2 | WEIGHT: 214 LBS | OXYGEN SATURATION: 97 % | HEART RATE: 98 BPM | HEIGHT: 68 IN | DIASTOLIC BLOOD PRESSURE: 73 MMHG

## 2024-04-17 DIAGNOSIS — J45.31 MILD PERSISTENT ASTHMA WITH ACUTE EXACERBATION (HCC): Primary | ICD-10-CM

## 2024-04-17 DIAGNOSIS — R05.1 ACUTE COUGH: ICD-10-CM

## 2024-04-17 PROCEDURE — 99214 OFFICE O/P EST MOD 30 MIN: CPT

## 2024-04-17 RX ORDER — BENZONATATE 100 MG/1
100 CAPSULE ORAL 2 TIMES DAILY PRN
Qty: 20 CAPSULE | Refills: 0 | Status: SHIPPED | OUTPATIENT
Start: 2024-04-17

## 2024-04-17 RX ORDER — IPRATROPIUM BROMIDE AND ALBUTEROL SULFATE 2.5; .5 MG/3ML; MG/3ML
3 SOLUTION RESPIRATORY (INHALATION) EVERY 6 HOURS PRN
Qty: 60 ML | Refills: 0 | Status: SHIPPED | OUTPATIENT
Start: 2024-04-17

## 2024-04-17 RX ORDER — FLUTICASONE PROPIONATE AND SALMETEROL 250; 50 UG/1; UG/1
1 POWDER RESPIRATORY (INHALATION) EVERY 12 HOURS SCHEDULED
Qty: 60 EACH | Refills: 0 | Status: SHIPPED | OUTPATIENT
Start: 2024-04-17

## 2024-04-17 NOTE — PROGRESS NOTES
Subjective:   Isis Gramaoj is a 43 year old female who presents for ER F/U (Was in ER 4/15 for Acute bronchospasm, is currently having pain in different areas of her body due to the coughing)     ER f/u for asthma exacerbation, was given prednisone, guaifenesin-codeine, albuterol nebulizers  Was doing okay yesterday but after the rain her symptoms worsened again  Has been sleeping on the recliner due to pain all over her body from coughing. Has many surgical incisions from endometriosis surgeries, these are painful with cough, as well as back pain.   This morning she woke up at 2am with difficulty breathing and started coughing. Did an hour long nebulizer treatment which did help.   Still has a tickle in the back of her throat, harsh dry cough, and shortness of breath fluctuating in intensity. Last used albuterol neb an hour ago and albuterol inhaler in the office lobby.  Also on spiriva, zyrtec, montelukast and flonase nasal spray  States benzonatate usually does not work for her  Last night almost felt as bad as when she went to the ER, felt constriction in the chest  Unable to f/u with Dr. Rodriguez pulmonology until next week    Per RN triage note  Patient is calling for a follow up to 4/15 ED visit. Has been trying to contact Dr. Rodriguez as advised and has not received a response back. Continues to have a cough that triggers asthma and would like to be seen by someone today. Scheduled patient an appointment. Advised to call back if becomes worse or has any questions or concerns. Patient verbalized understanding and agreed        History/Other:    Chief Complaint Reviewed and Verified  Nursing Notes Reviewed and   Verified  Tobacco Reviewed  Allergies Reviewed  Medications Reviewed    Problem List Reviewed  Medical History Reviewed  Surgical History   Reviewed  OB Status Reviewed  Family History Reviewed         Tobacco:  She has never smoked tobacco.    Current Outpatient Medications   Medication Sig  Dispense Refill    benzonatate 100 MG Oral Cap Take 1 capsule (100 mg total) by mouth 2 (two) times daily as needed. 20 capsule 0    ipratropium-albuterol 0.5-2.5 (3) MG/3ML Inhalation Solution Take 3 mL by nebulization every 6 (six) hours as needed. 60 mL 0    fluticasone-salmeterol 250-50 MCG/ACT Inhalation Aerosol Powder, Breath Activated Inhale 1 puff into the lungs every 12 (twelve) hours. 60 each 0    albuterol 108 (90 Base) MCG/ACT Inhalation Aero Soln Inhale 2 puffs into the lungs every 4 (four) hours as needed. 1 each 0    guaiFENesin-codeine (CHERATUSSIN AC) 100-10 MG/5ML Oral Solution Take 5 mL by mouth every 6 (six) hours as needed. 120 mL 0    montelukast 10 MG Oral Tab Take 1 tablet (10 mg total) by mouth nightly. 90 tablet 3    clotrimazole-betamethasone 1-0.05 % External Cream Apply topically to affected sites 2x daily 45 g 1    Tiotropium Bromide Monohydrate (SPIRIVA RESPIMAT) 2.5 MCG/ACT Inhalation Aero Soln Inhale into the lungs.      betamethasone dipropionate 0.05 % External Ointment Apply 1 g topically daily as needed (ear itch). 15 g 0    Rizatriptan Benzoate 10 MG Oral Tab use at onset; may repeat once after 2 hours- ONLY 2 IN 24 HOUR PERIOD MAX.  This is a 30 day supply. 12 tablet 3    fluticasone propionate (FLONASE ALLERGY RELIEF) 50 MCG/ACT Nasal Suspension by Each Nare route daily.      Multiple Vitamin (HIGH POTENCY MULTIVITAMIN) Oral Tab Take 1 tablet by mouth daily. 30 tablet 0    albuterol 108 (90 Base) MCG/ACT Inhalation Aero Soln Inhale 2 puffs into the lungs every 6 (six) hours as needed for Wheezing or Shortness of Breath. 1 each 1    albuterol (2.5 MG/3ML) 0.083% Inhalation Nebu Soln INHALE 1 VIAL PO VIA NEBULIZER Q 4 H PRN 1 each 0    CALCIUM CITRATE-VITAMIN D OR Take by mouth.      OMEGA-3 FATTY ACIDS OR Take by mouth.      ZYRTEC-D ALLERGY & CONGESTION 5-120 MG Oral Tablet 12 Hr TK 1 T PO 1 TO 2 TIMES A DAY      predniSONE 20 MG Oral Tab Take 2 tablets (40 mg total) by mouth  daily for 5 days. 10 tablet 0     Review of Systems:  Review of Systems   Constitutional: Negative.    Respiratory:  Positive for cough and shortness of breath.    Cardiovascular: Negative.    Gastrointestinal: Negative.    Skin: Negative.    Neurological: Negative.      Objective:   /73   Pulse 98   Ht 5' 8\" (1.727 m)   Wt 214 lb (97.1 kg)   SpO2 97%   BMI 32.54 kg/m²  Estimated body mass index is 32.54 kg/m² as calculated from the following:    Height as of this encounter: 5' 8\" (1.727 m).    Weight as of this encounter: 214 lb (97.1 kg).  Physical Exam  Vitals reviewed.   Constitutional:       General: She is not in acute distress.     Appearance: She is well-developed. She is ill-appearing.   HENT:      Right Ear: Tympanic membrane normal.      Left Ear: Tympanic membrane normal.      Mouth/Throat:      Mouth: Mucous membranes are moist.      Pharynx: Posterior oropharyngeal erythema (mild) present.   Cardiovascular:      Rate and Rhythm: Normal rate and regular rhythm.      Heart sounds: Normal heart sounds.   Pulmonary:      Effort: Pulmonary effort is normal. Tachypnea present. No accessory muscle usage or respiratory distress.      Breath sounds: Normal breath sounds.      Comments: Harsh cough  Skin:     General: Skin is warm and dry.   Neurological:      Mental Status: She is alert and oriented to person, place, and time.       Assessment & Plan:   1. Mild persistent asthma with acute exacerbation (HCC) (Primary)  -     Ipratropium-Albuterol; Take 3 mL by nebulization every 6 (six) hours as needed.  Dispense: 60 mL; Refill: 0  -     Fluticasone-Salmeterol; Inhale 1 puff into the lungs every 12 (twelve) hours.  Dispense: 60 each; Refill: 0  Aware not to take duoneb with albuterol - choose one or the other, every 6 hours   Continue oral steroids  2. Acute cough  -     Benzonatate; Take 1 capsule (100 mg total) by mouth 2 (two) times daily as needed.  Dispense: 20 capsule; Refill: 0  Provided work  note to return on Monday    FILIPE Moncada, 4/17/2024, 12:04 PM

## 2024-04-17 NOTE — TELEPHONE ENCOUNTER
Patient is calling for a follow up to 4/15 ED visit. Has been trying to contact Dr. Rodriguez as advised and has not received a response back. Continues to have a cough that triggers asthma and would like to be seen by someone today. Scheduled patient an appointment. Advised to call back if becomes worse or has any questions or concerns. Patient verbalized understanding and agreed    Future Appointments   Date Time Provider Department Center   4/17/2024 12:00 PM Anna Hobbs APRN [facility]

## 2024-04-17 NOTE — TELEPHONE ENCOUNTER
Spoke to patient who is still not well since ER visit on 4/15/2024. Complaining of excessive coughing. Patient has appointment with PCP today and is aware Dr. Rodriguez is out of the office until next week.

## 2024-04-22 ENCOUNTER — TELEPHONE (OUTPATIENT)
Dept: PULMONOLOGY | Facility: CLINIC | Age: 44
End: 2024-04-22

## 2024-04-22 NOTE — TELEPHONE ENCOUNTER
Spoke with patient. Patient went to the ED on 4/15 for cough/shortness of breath- short course of Prednisone and Cheratussin prescribed. Patient also followed-up APRN at PCP's office and was prescribed Duoneb and Advair. Patient reports lingering cough and shortness of breath. Follow-up appointment scheduled for tomorrow 4/23/24 at 1:15PM. Verified date, time, place. Patient verbalized understanding.

## 2024-04-23 ENCOUNTER — OFFICE VISIT (OUTPATIENT)
Dept: PULMONOLOGY | Facility: CLINIC | Age: 44
End: 2024-04-23
Payer: COMMERCIAL

## 2024-04-23 VITALS
BODY MASS INDEX: 32.98 KG/M2 | DIASTOLIC BLOOD PRESSURE: 73 MMHG | OXYGEN SATURATION: 99 % | WEIGHT: 217.63 LBS | HEIGHT: 68 IN | HEART RATE: 101 BPM | SYSTOLIC BLOOD PRESSURE: 118 MMHG

## 2024-04-23 DIAGNOSIS — J45.30 MILD PERSISTENT ASTHMA WITHOUT COMPLICATION (HCC): Primary | ICD-10-CM

## 2024-04-23 PROCEDURE — 99213 OFFICE O/P EST LOW 20 MIN: CPT | Performed by: INTERNAL MEDICINE

## 2024-04-23 RX ORDER — PREDNISONE 20 MG/1
TABLET ORAL
Qty: 10 TABLET | Refills: 1 | Status: SHIPPED | OUTPATIENT
Start: 2024-04-23

## 2024-04-23 NOTE — PROGRESS NOTES
The patient is 43-year-old female who I know well from prior evaluation.  She had recent prolonged asthma exacerbation.  She normally has mild persistent asthma and had been doing well on Spiriva previously.  We have not given inhaled steroids because of history of thrush.  She was seen in the emergency room.  She missed a couple weeks of work.  She is doing somewhat better now having completed the oral steroid few days ago.    Review of Systems:  Vision normal. Ear nose and throat normal. Bowel normal. Bladder function normal. No depression. No thyroid disease. No lymphatic system concerns.  No rash. Muscles and joints unremarkable. No weight loss no weight gain.    Physical Examination:  Vital signs normal. HEENT examination is unremarkable with pupils equal round and reactive to light and accommodation. Neck without adenopathy, thyromegaly, JVD nor bruit. Lungs clear to auscultation and percussion. Cardiac regular rate and rhythm no murmur. Abdomen nontender, without hepatosplenomegaly and no mass appreciable. Extremities and Musculoskeletal without clubbing cyanosis nor edema, and mobility acceptable. Neurologic grossly intact with symmetric tone and strength and reflex.    Assessment and plan:  1.  Asthma exacerbation-doing better clinically now.  Will extend the short course of steroid, patient to continue on the current regime of Advair and Spiriva as well as Singulair with albuterol nebulizer and MDI as needed.  The patient should plan on seeing me at the 3 to 4-month interval or sooner if needed.  Contact me promptly if new trouble.  Will give an extra course of steroid for patient to keep on hand if crisis in the future.

## 2024-04-24 ENCOUNTER — OFFICE VISIT (OUTPATIENT)
Dept: SURGERY | Facility: CLINIC | Age: 44
End: 2024-04-24

## 2024-04-24 DIAGNOSIS — N39.41 URGENCY INCONTINENCE: Primary | ICD-10-CM

## 2024-04-24 LAB
BILIRUB UR QL: NEGATIVE
CLARITY UR: CLEAR
COLOR UR: YELLOW
GLUCOSE UR-MCNC: NORMAL MG/DL
HGB UR QL STRIP.AUTO: NEGATIVE
KETONES UR-MCNC: 10 MG/DL
LEUKOCYTE ESTERASE UR QL STRIP.AUTO: NEGATIVE
NITRITE UR QL STRIP.AUTO: NEGATIVE
PH UR: 5.5 [PH] (ref 5–8)
SP GR UR STRIP: 1.03 (ref 1–1.03)
UROBILINOGEN UR STRIP-ACNC: NORMAL

## 2024-04-24 PROCEDURE — 99203 OFFICE O/P NEW LOW 30 MIN: CPT | Performed by: UROLOGY

## 2024-04-24 NOTE — PROGRESS NOTES
Claudia Earl MD  Department of Urology  [address] [place] [address] Rd[address]  [place] [address]    T: [phone]  F: [phone]    To: Dana Barrett MD   [address] [place] [id number]    Re: Isis Gramajo   MRN: [id number]  : 1980    Dear Dana Barrett MD,    Today I had the pleasure of seeing Isis Gramajo in my clinic. As you know, Ms. Easton Gramajo is a pleasant 43 year old year old female who I am seeing for flank pain and urinary symptoms. Patient was last seen in this department on Visit date not found.    Briefly, she states that she recently done asthma and a severe cough.  She has been on treatment for this.  She recently noticed right-sided flank pain when she coughs but the pain is also present when she does not cough.  He also notices pain in the bladder/pelvis region.  She wonders if this is a urinary tract infection.       PAST MEDICAL HISTORY:  Past Medical History:    Allergic rhinitis    Arm vein blood clot    during pregnancy    Asthma (HCC)    Endometriosis    H/O LEEP    HELLP (hemolytic anemia/elev liver enzymes/low platelets in pregnancy) (HCC)    History of DVT (deep vein thrombosis)    Migraines    Visual impairment    glasses        PAST SURGICAL HISTORY:  Past Surgical History:   Procedure Laterality Date    Appendectomy             delivery only  2014    Cholecystectomy  2016    Hysterectomy  2015    Laparoscopy,diagnostic  , ,,2005    ablation of implants         ALLERGIES:  Allergies   Allergen Reactions    Benzoyl Peroxide FACE FLUSHING, HIVES, RASH and SWELLING    Ciprofloxacin HALLUCINATION         MEDICATIONS:  Current Outpatient Medications   Medication Instructions    albuterol (2.5 MG/3ML) 0.083% Inhalation Nebu Soln INHALE 1 VIAL PO VIA NEBULIZER Q 4 H PRN    albuterol 108 (90 Base) MCG/ACT Inhalation Aero Soln 2 puffs, Inhalation, Every 6 hours PRN    albuterol 108 (90 Base) MCG/ACT Inhalation Aero Soln 2 puffs,  Inhalation, Every 4 hours PRN    benzonatate (TESSALON) 100 mg, Oral, 2 times daily PRN    betamethasone dipropionate (DIPROSONE) 1 g, Topical, DAILY PRN    CALCIUM CITRATE-VITAMIN D OR Oral    clotrimazole-betamethasone 1-0.05 % External Cream Apply topically to affected sites 2x daily    fluticasone propionate (FLONASE ALLERGY RELIEF) 50 MCG/ACT Nasal Suspension Each Nare, Daily    fluticasone-salmeterol 250-50 MCG/ACT Inhalation Aerosol Powder, Breath Activated 1 puff, Inhalation, Every 12 hours scheduled    ipratropium-albuterol 0.5-2.5 (3) MG/3ML Inhalation Solution 3 mL, Nebulization, Every 6 hours PRN    montelukast (SINGULAIR) 10 mg, Oral, Nightly    Multiple Vitamin (HIGH POTENCY MULTIVITAMIN) Oral Tab 1 tablet, Oral, Daily    OMEGA-3 FATTY ACIDS OR Oral    predniSONE 20 MG Oral Tab Take 1 tablet daily for 5 days and then 1 tablet every other day until completed    Rizatriptan Benzoate 10 MG Oral Tab use at onset; may repeat once after 2 hours- ONLY 2 IN 24 HOUR PERIOD MAX.  This is a 30 day supply.    Tiotropium Bromide Monohydrate (SPIRIVA RESPIMAT) 2.5 MCG/ACT Inhalation Aero Soln Inhalation    ZYRTEC-D ALLERGY & CONGESTION 5-120 MG Oral Tablet 12 Hr TK 1 T PO 1 TO 2 TIMES A DAY        FAMILY HISTORY:  Family History   Problem Relation Age of Onset    Heart Disorder Father     Hypertension Father     Lipids Father     Cancer Paternal Grandmother     Breast Cancer Other         paternal cousin    Ovarian Cancer Other         paternal cousin    Other (testicular cancer) Other         paternal cousin    Breast Cancer Maternal Cousin Female 40    Ovarian Cancer Maternal Cousin Female 43        SOCIAL HISTORY:  Social History     Socioeconomic History    Marital status:    Tobacco Use    Smoking status: Never    Smokeless tobacco: Never   Vaping Use    Vaping status: Never Used   Substance and Sexual Activity    Alcohol use: Yes     Comment: rarely    Drug use: Never    Sexual activity: Not Currently      Partners: Male     Birth control/protection: Hysterectomy   Other Topics Concern    Caffeine Concern Yes     Comment: coffee    Exercise Yes          PHYSICAL EXAMINATION:  There were no vitals filed for this visit.  CONSTITUTIONAL: No apparent distress, cooperative and communicative  NEUROLOGIC: Alert and oriented   HEAD: Normocephalic, atraumatic   EYES: Sclera non-icteric   ENT: Hearing intact, moist mucous membranes   NECK: No obvious goiter or masses   RESPIRATORY: Normal respiratory effort, Nonlabored breathing on room air  SKIN: No evident rashes       REVIEW OF SYSTEMS:    A comprehensive 10-point review of systems was completed.  Pertinent positives and negatives are noted in the the HPI.       LABORATORY DATA:      Component  Ref Range & Units 11/3/23  8:36 AM   Glucose  70 - 99 mg/dL 89   Sodium  136 - 145 mmol/L 138   Potassium  3.5 - 5.1 mmol/L 4.0   Chloride  98 - 112 mmol/L 107   CO2  21.0 - 32.0 mmol/L 27.0   Anion Gap  0 - 18 mmol/L 4   BUN  9 - 23 mg/dL 9   Creatinine  0.55 - 1.02 mg/dL 0.75   BUN/CREA Ratio  10.0 - 20.0 12.0           WBC  4.0 - 11.0 x10(3) uL 8.1   RBC  3.80 - 5.30 x10(6)uL 5.04   HGB  12.0 - 16.0 g/dL 14.6   HCT  35.0 - 48.0 % 41.7   MCV  80.0 - 100.0 fL 82.7   MCH  26.0 - 34.0 pg 29.0   MCHC  31.0 - 37.0 g/dL 35.0   RDW-SD  35.1 - 46.3 fL 41.3   RDW  11.0 - 15.0 % 13.7   PLT  150.0 - 450.0 10(3)uL 221.0            Component  Ref Range & Units 4/15/24  4:09 PM   Glucose  70 - 99 mg/dL 97   Sodium  136 - 145 mmol/L 137   Potassium  3.5 - 5.1 mmol/L 3.8   Chloride  98 - 112 mmol/L 108   CO2  21.0 - 32.0 mmol/L 22.0   Anion Gap  0 - 18 mmol/L 7   BUN  9 - 23 mg/dL 14   Creatinine  0.55 - 1.02 mg/dL 0.99   BUN/CREA Ratio  10.0 - 20.0 14.1   Calcium, Total  8.7 - 10.4 mg/dL 9.5   Calculated Osmolality  275 - 295 mOsm/kg 284   eGFR-Cr  >=60 mL/min/1.73m2 72        IMAGING REVIEW:  Narrative             Impression   PROCEDURE: XR CHEST AP PORTABLE  (CPT=71045)  TIME: 1608      COMPARISON: [facility], XR CHEST AP PORTABLE (CPT=71045), 7/17/2023, 2:28 PM.     INDICATIONS: Asthma, chest pain and cough     TECHNIQUE:   Single view.       Findings and impression:  Normal heart size, clear lungs, normal pleura             Dictated by (CST): Arsalan Rogers MD on 4/15/2024 at 5:59 PM      Finalized by (CST): Arsalan Rogers MD on 4/15/2024 at 5:59 PM              OTHER RELEVANT DATA:   none     IMPRESSION: Nonspecific right flank pain possibly secondary to her recent cough versus other etiology-will obtain renal bladder ultrasound to ensure no kidney abnormalities.  If any hydronephrosis or other signs will plan to follow-up with a CT scan.    As she is also having some lower urinary tract symptoms we will plan for a UA reflex to culture.  If she has microscopic hematuria or urinary tract infection she will need further workup or treatment.     PLAN:  UA reflex to culture we will contact patient with results and if treatment is needed.0  Renal bladder ultrasound-will contact patient with results and if CT scan is needed    Thank you for referring this very pleasant patient to my clinic. If you have any questions or concerns, please do not hesitate to contact me.    Sincerely,  Claudia Earl MD    20 minutes were spent on this patient at this visit obtaining a history, reviewing medical records, developing a treatment plan, counseling and discussing treatment strategy with patient, coordination of care and documentation.     The 21st Century Cures Act makes medical notes available to patients in the interest of transparency.  However, please be advised that this is a medical document.  It is intended as a peer to peer communication.  It is written in medical language and may contain abbreviations or verbiage that are technical and unfamiliar.  It may appear blunt or direct.  Medical documents are intended to carry relevant information, facts as evident, and the clinical opinion  of the practitioner.

## 2024-04-29 ENCOUNTER — HOSPITAL ENCOUNTER (OUTPATIENT)
Dept: ULTRASOUND IMAGING | Age: 44
Discharge: HOME | End: 2024-04-29
Attending: UROLOGY
Payer: COMMERCIAL

## 2024-04-29 DIAGNOSIS — N39.41 URGENCY INCONTINENCE: ICD-10-CM

## 2024-04-29 PROCEDURE — 76770 US EXAM ABDO BACK WALL COMP: CPT | Performed by: UROLOGY

## 2024-06-07 ENCOUNTER — TELEPHONE (OUTPATIENT)
Dept: PODIATRY CLINIC | Facility: CLINIC | Age: 44
End: 2024-06-07

## 2024-06-07 DIAGNOSIS — B35.3 TINEA PEDIS OF BOTH FEET: Primary | ICD-10-CM

## 2024-06-07 RX ORDER — KETOCONAZOLE 20 MG/G
CREAM TOPICAL
Qty: 30 G | Refills: 2 | Status: SHIPPED | OUTPATIENT
Start: 2024-06-07

## 2024-06-09 DIAGNOSIS — J45.31 MILD PERSISTENT ASTHMA WITH ACUTE EXACERBATION (HCC): ICD-10-CM

## 2024-06-13 NOTE — TELEPHONE ENCOUNTER
Please review. Protocol Failed; No Protocol    Requested Prescriptions   Pending Prescriptions Disp Refills    FLUTICASONE-SALMETEROL 250-50 MCG/ACT Inhalation Aerosol Powder, Breath Activated [Pharmacy Med Name: FLUTICASONE/SALM DISK 250/50MCG 60S] 180 each 0     Sig: INHALE 1 PUFF INTO THE LUNGS EVERY 12 HOURS       Asthma & COPD Medication Protocol Failed - 6/9/2024  2:39 PM        Failed - Asthma Action Score greater than or equal to 20        Failed - AAP/ACT given in last 12 months     No data recorded  No data recorded  No data recorded  No data recorded          Passed - Appointment in past 6 or next 3 months      Recent Outpatient Visits              1 month ago Urgency incontinence    [facility] Claudia Earl MD    Office Visit    1 month ago Mild persistent asthma without complication (HCC)    [facility] Merrick Rodriguez MD    Office Visit    1 month ago Mild persistent asthma with acute exacerbation (HCC)    [facility] Anna Hobbs APRN    Office Visit    2 months ago Mild persistent asthma with acute exacerbation (HCC)    [facility]    Office Visit    2 months ago Mild intermittent asthma without complication ([facility])    [facility] Merrick Rodriguez MD    Office Visit          Future Appointments         Provider Department Appt Notes    In 1 month [facility] in about 3 months follow up                           Future Appointments         Provider Department Appt Notes    In 1 month [facility] Return in about 3 months follow up          Recent Outpatient Visits              1 month ago Urgency incontinence    [place]  [facility]    Office Visit    1 month ago Mild persistent asthma without complication (HCC)    [facility], Merrick Miller MD    Office Visit    1 month ago Mild persistent asthma with acute exacerbation (HCC)    [facility] [place]    Office Visit    2 months ago Mild persistent asthma with acute exacerbation (HCC)    [facility], [facility], Jose Jaimes MD    Office Visit    2 months ago Mild intermittent asthma without complication (HCC)    [facility], [facility], Merrick Miller MD    Office Visit

## 2024-06-16 RX ORDER — FLUTICASONE PROPIONATE AND SALMETEROL 250; 50 UG/1; UG/1
1 POWDER RESPIRATORY (INHALATION) EVERY 12 HOURS
Qty: 180 EACH | Refills: 2 | Status: SHIPPED | OUTPATIENT
Start: 2024-06-16

## 2024-06-17 NOTE — TELEPHONE ENCOUNTER
Has not seen Dr. Barrett for almost 2 years and she does like to see her patients at least yearly to continue as PCP - please ask her to schedule annual physical which is overdue

## 2024-06-17 NOTE — TELEPHONE ENCOUNTER
Patient Center Representative  please call and schedule a phycical appointment.  Thank You.  Mychart message sent to the patient.

## 2024-07-11 ENCOUNTER — OFFICE VISIT (OUTPATIENT)
Dept: DERMATOLOGY CLINIC | Facility: CLINIC | Age: 44
End: 2024-07-11

## 2024-07-11 DIAGNOSIS — Z12.83 SCREENING EXAM FOR SKIN CANCER: Primary | ICD-10-CM

## 2024-07-11 DIAGNOSIS — L70.0 ACNE VULGARIS: ICD-10-CM

## 2024-07-11 DIAGNOSIS — D22.9 MULTIPLE NEVI: ICD-10-CM

## 2024-07-11 PROCEDURE — 99203 OFFICE O/P NEW LOW 30 MIN: CPT | Performed by: PHYSICIAN ASSISTANT

## 2024-07-11 RX ORDER — CLINDAMYCIN PHOSPHATE 10 UG/ML
1 LOTION TOPICAL DAILY
Qty: 60 ML | Refills: 3 | Status: SHIPPED | OUTPATIENT
Start: 2024-07-11

## 2024-07-11 NOTE — PROGRESS NOTES
HPI:    Patient ID: Isis Gramajo is a 43 year old female.    Patient presents for a FBSE. Patient denies any lesions of concern. Patient denies a personal or family Hx of skin cancer. Tries to wear sunscreen. No draining or tenderness noted. Hx of allergies to medications noted. Patient complains of chronic acne on the face, back, and chest. Notices an increase when using oral steroids during asthma flare. Acne does tend to get painful, cystic, and inflammed on the back and chest. Patient uses Neutrogena toner, Salicylic  acid, and face wash as well as Cetaphil products. Has had a hysterectomy.         Review of Systems   Constitutional:  Negative for chills and fever.   Musculoskeletal:  Negative for arthralgias and myalgias.   Skin:  Positive for rash. Negative for color change and wound.            Current Outpatient Medications   Medication Sig Dispense Refill    clindamycin 1 % External Lotion Apply 1 Application topically daily. 60 mL 3    fluticasone-salmeterol 250-50 MCG/ACT Inhalation Aerosol Powder, Breath Activated Inhale 1 puff into the lungs Q12H. 180 each 2    ketoconazole 2 % External Cream Apply topically to affected sites 2x daily 30 g 2    ipratropium-albuterol 0.5-2.5 (3) MG/3ML Inhalation Solution Take 3 mL by nebulization every 6 (six) hours as needed. 60 mL 0    montelukast 10 MG Oral Tab Take 1 tablet (10 mg total) by mouth nightly. 90 tablet 3    clotrimazole-betamethasone 1-0.05 % External Cream Apply topically to affected sites 2x daily 45 g 1    Tiotropium Bromide Monohydrate (SPIRIVA RESPIMAT) 2.5 MCG/ACT Inhalation Aero Soln Inhale into the lungs.      betamethasone dipropionate 0.05 % External Ointment Apply 1 g topically daily as needed (ear itch). 15 g 0    Rizatriptan Benzoate 10 MG Oral Tab use at onset; may repeat once after 2 hours- ONLY 2 IN 24 HOUR PERIOD MAX.  This is a 30 day supply. 12 tablet 3    fluticasone propionate (FLONASE ALLERGY RELIEF) 50 MCG/ACT Nasal  Suspension by Each Nare route daily.      Multiple Vitamin (HIGH POTENCY MULTIVITAMIN) Oral Tab Take 1 tablet by mouth daily. 30 tablet 0    albuterol 108 (90 Base) MCG/ACT Inhalation Aero Soln Inhale 2 puffs into the lungs every 6 (six) hours as needed for Wheezing or Shortness of Breath. 1 each 1    albuterol (2.5 MG/3ML) 0.083% Inhalation Nebu Soln INHALE 1 VIAL PO VIA NEBULIZER Q 4 H PRN 1 each 0    CALCIUM CITRATE-VITAMIN D OR Take by mouth.      OMEGA-3 FATTY ACIDS OR Take by mouth.      ZYRTEC-D ALLERGY & CONGESTION 5-120 MG Oral Tablet 12 Hr TK 1 T PO 1 TO 2 TIMES A DAY      predniSONE 20 MG Oral Tab Take 1 tablet daily for 5 days and then 1 tablet every other day until completed (Patient not taking: Reported on 7/11/2024) 10 tablet 1    benzonatate 100 MG Oral Cap Take 1 capsule (100 mg total) by mouth 2 (two) times daily as needed. (Patient not taking: Reported on 7/11/2024) 20 capsule 0     Allergies:  Allergies   Allergen Reactions    Benzoyl Peroxide FACE FLUSHING, HIVES, RASH and SWELLING    Ciprofloxacin HALLUCINATION      There were no vitals taken for this visit.  There is no height or weight on file to calculate BMI.  PHYSICAL EXAM:   Physical Exam  Constitutional:       General: She is not in acute distress.     Appearance: Normal appearance.   Skin:     General: Skin is warm and dry.      Findings: Rash present.      Comments: Benign moles scattered throughout the body. No drianing or tenderness noted. No scaling noted. Brown and tan macules noted. Benign patterns noted on dermoscopy.     Acne lesions noted on the back, chest and face. No draining or tenderness noted. Papules and cystic lesions noted. Scarring noted on the back.    Neurological:      Mental Status: She is alert and oriented to person, place, and time.                ASSESSMENT/PLAN:   1. Screening exam for skin cancer  -After discussion with patient, advised the following:  Reassurance regarding other benign skin lesions. Signs  and symptoms of skin cancer, ABCDE's of melanoma discussed with patient. Sunscreen use, sun protection, self exams reviewed. Followup as noted RTC ---routine checkup 6 mos -one year or p.r.n.   -Pt was agreeable to plan and will comply with discussion above.     2. Acne vulgaris  -After discussion with patient, advised the following:  -Started Clindamycin   -Educated to apply daily in the morning.   -Started tretinoin as well  -Educated to apply 2-3 times per week at night only  -Can titrate up to nightly if not redness, irritation or dryness noted  -Shoulder moisturize daily  -Should cleanse daily and after sports.   -Check hormone levels  -Will call with results.   -To return in 3 months for follow-up to monitor progress and toleration.   -To call or follow-up with worsening symptoms or concerns.   -Pt was agreeable to plan and will comply with discussion above.       - Dehydroepiandrosterone Sulfate; Future  - Testosterone,Total and Weakly Bound w/ SHBG; Future    3. Multiple nevi  -After discussion with patient, advised the following:  -Benign lesions  -Observe for now  -Return if there are changes.   -Went over ABCDE changes with the patient.   -Encouraged sun protection.   -To call or follow-up with worsening symptoms or concerns.   -Pt was agreeable to plan and will comply with discussion above.         Orders Placed This Encounter   Procedures    Dehydroepiandrosterone Sulfate    Testosterone,Total and Weakly Bound w/ SHBG       Meds This Visit:  Requested Prescriptions     Signed Prescriptions Disp Refills    clindamycin 1 % External Lotion 60 mL 3     Sig: Apply 1 Application topically daily.       Imaging & Referrals:  None         ID#[id number]

## 2024-07-30 ENCOUNTER — OFFICE VISIT (OUTPATIENT)
Dept: PULMONOLOGY | Facility: CLINIC | Age: 44
End: 2024-07-30

## 2024-07-30 VITALS
HEIGHT: 68 IN | OXYGEN SATURATION: 98 % | DIASTOLIC BLOOD PRESSURE: 66 MMHG | HEART RATE: 93 BPM | SYSTOLIC BLOOD PRESSURE: 116 MMHG | WEIGHT: 215 LBS | BODY MASS INDEX: 32.58 KG/M2

## 2024-07-30 DIAGNOSIS — J45.31 MILD PERSISTENT ASTHMA WITH ACUTE EXACERBATION (HCC): ICD-10-CM

## 2024-07-30 DIAGNOSIS — J45.30 MILD PERSISTENT ASTHMA WITHOUT COMPLICATION (HCC): Primary | ICD-10-CM

## 2024-07-30 PROCEDURE — 99213 OFFICE O/P EST LOW 20 MIN: CPT | Performed by: INTERNAL MEDICINE

## 2024-07-30 RX ORDER — PREDNISONE 20 MG/1
TABLET ORAL
Qty: 10 TABLET | Refills: 0 | Status: SHIPPED | OUTPATIENT
Start: 2024-07-30

## 2024-07-30 RX ORDER — FLUTICASONE PROPIONATE AND SALMETEROL 250; 50 UG/1; UG/1
1 POWDER RESPIRATORY (INHALATION) EVERY 12 HOURS
Qty: 180 EACH | Refills: 3 | Status: SHIPPED | OUTPATIENT
Start: 2024-07-30

## 2024-07-30 NOTE — PROGRESS NOTES
The patient is a 43-year-old female who I know well from prior evaluation comes in now for follow-up.  Her asthma exacerbation has resolved and she is doing well clinically at this juncture.  Will give a short course prednisone to keep on hand and refill meds.    Review of Systems:  Vision normal. Ear nose and throat normal. Bowel normal. Bladder function normal. No depression. No thyroid disease. No lymphatic system concerns.  No rash. Muscles and joints unremarkable. No weight loss no weight gain.    Macro physical long    Assessment and plan:  1.  Asthma-mild persistent.  Doing well clinically.  Has recovered nicely from the recent exacerbation.    Recommendations: Short course prednisone to keep on hand, refill medications, annual flu shot, COVID boosters, contact me promptly if new trouble and otherwise patient can see me again at the 1 year interval or sooner if needed.

## 2024-07-31 ENCOUNTER — TELEPHONE (OUTPATIENT)
Dept: PULMONOLOGY | Facility: CLINIC | Age: 44
End: 2024-07-31

## 2024-07-31 NOTE — TELEPHONE ENCOUNTER
Patient states she will be renewing Family Medical Leave Act - all information will be the same as previous. [employer] will be faxing forms to Forms Department.  Release of Information received and scanned into patient's chart

## 2024-09-30 ENCOUNTER — OFFICE VISIT (OUTPATIENT)
Dept: INTERNAL MEDICINE CLINIC | Facility: CLINIC | Age: 44
End: 2024-09-30

## 2024-09-30 ENCOUNTER — NURSE TRIAGE (OUTPATIENT)
Dept: INTERNAL MEDICINE CLINIC | Facility: CLINIC | Age: 44
End: 2024-09-30

## 2024-09-30 VITALS
RESPIRATION RATE: 16 BRPM | BODY MASS INDEX: 32.89 KG/M2 | DIASTOLIC BLOOD PRESSURE: 80 MMHG | WEIGHT: 217 LBS | SYSTOLIC BLOOD PRESSURE: 119 MMHG | TEMPERATURE: 98 F | HEIGHT: 68 IN

## 2024-09-30 DIAGNOSIS — J34.89 SINUS PRESSURE: Primary | ICD-10-CM

## 2024-09-30 DIAGNOSIS — R22.0 LEFT FACIAL SWELLING: ICD-10-CM

## 2024-09-30 PROCEDURE — 99213 OFFICE O/P EST LOW 20 MIN: CPT | Performed by: NURSE PRACTITIONER

## 2024-09-30 RX ORDER — METHYLPREDNISOLONE 4 MG
TABLET, DOSE PACK ORAL
Qty: 21 TABLET | Refills: 0 | Status: SHIPPED | OUTPATIENT
Start: 2024-09-30

## 2024-09-30 NOTE — PROGRESS NOTES
Isis Gramajo is a 43 year old female.  Chief Complaint   Patient presents with    Swelling     Left side of nose and cheek and headache started this a.m.      HPI:   She presents with left sided facial swelling that is present under her left eye and cheek area. She states her symptoms started yesterday. This morning she noticed the swelling had increased and she had a headache.   She has a history of asthma and seasonal allergies. She is taking zyrtec, Flonase and Singulair.     No fever, chest pain and SOB.   No sore throat or trouble swallowing.   Current Outpatient Medications   Medication Sig Dispense Refill    methylPREDNISolone (MEDROL) 4 MG Oral Tablet Therapy Pack As directed. 21 tablet 0    amoxicillin clavulanate 875-125 MG Oral Tab Take 1 tablet by mouth 2 (two) times daily for 7 days. 14 tablet 0    fluticasone-salmeterol 250-50 MCG/ACT Inhalation Aerosol Powder, Breath Activated Inhale 1 puff into the lungs Q12H. 180 each 3    clindamycin 1 % External Lotion Apply 1 Application topically daily. 60 mL 3    tretinoin 0.025 % External Cream Apply 1 Application topically nightly. Use 2 pea sized dots for the chest and 2 pea sized dots for the back. 1 pea sized dot for the face. 30 g 3    ketoconazole 2 % External Cream Apply topically to affected sites 2x daily 30 g 2    ipratropium-albuterol 0.5-2.5 (3) MG/3ML Inhalation Solution Take 3 mL by nebulization every 6 (six) hours as needed. 60 mL 0    montelukast 10 MG Oral Tab Take 1 tablet (10 mg total) by mouth nightly. 90 tablet 3    clotrimazole-betamethasone 1-0.05 % External Cream Apply topically to affected sites 2x daily 45 g 1    betamethasone dipropionate 0.05 % External Ointment Apply 1 g topically daily as needed (ear itch). 15 g 0    Rizatriptan Benzoate 10 MG Oral Tab use at onset; may repeat once after 2 hours- ONLY 2 IN 24 HOUR PERIOD MAX.  This is a 30 day supply. 12 tablet 3    fluticasone propionate (FLONASE ALLERGY RELIEF) 50 MCG/ACT  Nasal Suspension by Each Nare route daily.      Multiple Vitamin (HIGH POTENCY MULTIVITAMIN) Oral Tab Take 1 tablet by mouth daily. 30 tablet 0    albuterol 108 (90 Base) MCG/ACT Inhalation Aero Soln Inhale 2 puffs into the lungs every 6 (six) hours as needed for Wheezing or Shortness of Breath. 1 each 1    albuterol (2.5 MG/3ML) 0.083% Inhalation Nebu Soln INHALE 1 VIAL PO VIA NEBULIZER Q 4 H PRN 1 each 0    CALCIUM CITRATE-VITAMIN D OR Take by mouth.      OMEGA-3 FATTY ACIDS OR Take by mouth.      ZYRTEC-D ALLERGY & CONGESTION 5-120 MG Oral Tablet 12 Hr TK 1 T PO 1 TO 2 TIMES A DAY        Past Medical History:    Allergic rhinitis    Arm vein blood clot    during pregnancy    Asthma (HCC)    Endometriosis    H/O LEEP    HELLP (hemolytic anemia/elev liver enzymes/low platelets in pregnancy) (HCC)    History of DVT (deep vein thrombosis)    Migraines    Visual impairment    glasses      Past Surgical History:   Procedure Laterality Date    Appendectomy             delivery only      Cholecystectomy  2016    Hysterectomy  2015    Laparoscopy,diagnostic  , ,,    ablation of implants      Social History:  Social History     Socioeconomic History    Marital status:    Tobacco Use    Smoking status: Never    Smokeless tobacco: Never   Vaping Use    Vaping status: Never Used   Substance and Sexual Activity    Alcohol use: Yes     Comment: rarely    Drug use: Never    Sexual activity: Not Currently     Partners: Male     Birth control/protection: Hysterectomy   Other Topics Concern    Caffeine Concern Yes     Comment: coffee    Exercise Yes    Breast feeding No    Reaction to local anesthetic No    Pt has a pacemaker No    Pt has a defibrillator No      Family History   Problem Relation Age of Onset    Heart Disorder Father     Hypertension Father     Lipids Father     Cancer Paternal Grandmother     Breast Cancer Other         paternal cousin    Ovarian Cancer Other          paternal cousin    Other (testicular cancer) Other         paternal cousin    Breast Cancer Maternal Cousin Female 40    Ovarian Cancer Maternal Cousin Female 43      Allergies   Allergen Reactions    Benzoyl Peroxide FACE FLUSHING, HIVES, RASH and SWELLING    Ciprofloxacin HALLUCINATION        REVIEW OF SYSTEMS:     Review of Systems   Constitutional:  Negative for fever.   HENT:  Positive for ear pain (left ear), facial swelling (left cheek) and sinus pressure. Negative for congestion, postnasal drip and sore throat.    Respiratory:  Negative for cough, shortness of breath and wheezing.    Cardiovascular:  Negative for chest pain.   Gastrointestinal:  Negative for abdominal pain.   Genitourinary: Negative.    Musculoskeletal: Negative.    Skin: Negative.    Neurological:  Positive for headaches. Negative for dizziness.   Psychiatric/Behavioral: Negative.        Wt Readings from Last 5 Encounters:   09/30/24 217 lb (98.4 kg)   07/30/24 215 lb (97.5 kg)   04/23/24 217 lb 9.6 oz (98.7 kg)   04/17/24 214 lb (97.1 kg)   04/15/24 215 lb (97.5 kg)     Body mass index is 32.99 kg/m².      EXAM:   /80 (BP Location: Right arm, Patient Position: Sitting, Cuff Size: large)   Temp 98 °F (36.7 °C)   Resp 16   Ht 5' 8\" (1.727 m)   Wt 217 lb (98.4 kg)   BMI 32.99 kg/m²     Physical Exam  Vitals reviewed.   Constitutional:       Appearance: Normal appearance.   HENT:      Head: Normocephalic.        Comments: Swelling on the left side of face. Located under left eye and cheek. No rash or redness present.      Right Ear: Tympanic membrane normal.      Left Ear: Tympanic membrane normal.      Nose: Congestion present.      Right Sinus: No maxillary sinus tenderness or frontal sinus tenderness.      Left Sinus: Maxillary sinus tenderness and frontal sinus tenderness present.      Mouth/Throat:      Pharynx: No posterior oropharyngeal erythema.   Eyes:      Extraocular Movements: Extraocular movements intact.      Pupils:  Pupils are equal, round, and reactive to light.   Cardiovascular:      Rate and Rhythm: Normal rate and regular rhythm.      Pulses: Normal pulses.   Pulmonary:      Breath sounds: Normal breath sounds. No wheezing.   Musculoskeletal:         General: No swelling. Normal range of motion.   Skin:     General: Skin is warm and dry.   Neurological:      Mental Status: She is alert and oriented to person, place, and time.   Psychiatric:         Mood and Affect: Mood normal.         Behavior: Behavior normal.            ASSESSMENT AND PLAN:   1. Sinus pressure  - amoxicillin clavulanate 875-125 MG Oral Tab; Take 1 tablet by mouth 2 (two) times daily for 7 days.  Dispense: 14 tablet; Refill: 0    2. Left facial swelling  - no rash or redness present  - likely secondary to sinus congestion   - methylPREDNISolone (MEDROL) 4 MG Oral Tablet Therapy Pack; As directed.  Dispense: 21 tablet; Refill: 0      The patient indicates understanding of these issues and agrees to the plan.  Return for if symptoms do not resolve.

## 2024-09-30 NOTE — TELEPHONE ENCOUNTER
Action Requested: Summary for Provider     []  Critical Lab, Recommendations Needed  [] Need Additional Advice  []   FYI    []   Need Orders  [] Need Medications Sent to Pharmacy  []  Other     SUMMARY: Patient calling, has left sided swelling of face. Started on side of left nose yesterday and then has spread to cheek. Headache today on left side of face that \" feels like a sinus infection\" but extends to behind ear and left side of head. No fever. Denies any dental concerns or problem within the mouth that she can see. No nausea or fever. Can breath ok out of mouth ok and right side of nose but left side feels full. Denies redness to face and no rash. Patient has asthma and takes Zyrtec D, Flonase and Singulair which she took all this today. No change in symptoms.   No visual disturbance.     Scheduled for today for evaluation as per below:  Future Appointments   Date Time Provider Department Center   9/30/2024 10:40 AM Shelia Castellanos APR[id number]   1/15/2025  3:20 PM [id number] [place]   7/31/2025 10:15 AM Merrick Rodriguez MD [id number]     Patient to call if symptoms change or go to ER if needed. Patient states understanding.     Reason for call: Swelling  Onset: yesterday       Reason for Disposition   Patient wants to be seen    Protocols used: Face Swelling-A-[place]

## 2024-10-09 NOTE — TELEPHONE ENCOUNTER
[employer] rep (Vinnie) called to ask for more information on patient leave - per Vinnie she will call back Friday for information and clarity on what her leave is for..

## 2024-10-15 NOTE — TELEPHONE ENCOUNTER
Per patient, forms are re cert with all information the same as previous forms. Patient was never continuously off. Informed patient she will need to contact [employer] due to them sending us disability forms, we need the Family Medical Leave Act forms to complete. Patient understood

## 2024-10-21 NOTE — TELEPHONE ENCOUNTER
Dr. Merrick Rodriguez    Please sign off on form if you agree to: Family Medical Leave Act Re-cert. 10/1/24-9/30/25    -Signature page will be the first page scanned  -From your Inbasket, Highlight the patient and click Chart   -Double click the 07/31/24 Forms Completion telephone encounter  -Scroll down to the Media section   -Click the blue Hyperlink: Family Medical Leave Act Dr. Rodriguez 10/21/24  -Click Acknowledge located in the top right ribbon/menu   -Drag the mouse into the blank space of the document and a + sign will appear. Left click to   electronically sign the document.  -Once signed, simply exit out of the screen and you signature will be saved.     Thank you,  RAFITA

## 2024-10-22 NOTE — TELEPHONE ENCOUNTER
Forms completed/faxed to [employer] [phone]. [employer] message sent to patient. Fax successful.

## 2024-12-16 ENCOUNTER — OFFICE VISIT (OUTPATIENT)
Dept: INTERNAL MEDICINE CLINIC | Facility: CLINIC | Age: 44
End: 2024-12-16

## 2024-12-16 VITALS
RESPIRATION RATE: 16 BRPM | SYSTOLIC BLOOD PRESSURE: 108 MMHG | WEIGHT: 219 LBS | HEIGHT: 68 IN | HEART RATE: 83 BPM | DIASTOLIC BLOOD PRESSURE: 73 MMHG | BODY MASS INDEX: 33.19 KG/M2

## 2024-12-16 DIAGNOSIS — L30.9 FACIAL DERMATITIS: Primary | ICD-10-CM

## 2024-12-16 RX ORDER — TRIAMCINOLONE ACETONIDE 0.25 MG/G
1 CREAM TOPICAL 2 TIMES DAILY
Qty: 15 G | Refills: 0 | Status: SHIPPED | OUTPATIENT
Start: 2024-12-16

## 2024-12-16 NOTE — PROGRESS NOTES
Isis Gramajo is a 44 year old female.  Chief Complaint   Patient presents with    Itching     Eye lids and under eyes itching, with burning feeling     HPI:   She presents with facial redness and itchiness. The redness is located on both cheeks. Her symptoms started on Saturday.     She is applying banana boat facial lotion. She also tried coconut oil with some relief.     She does have a history of eczema. She denies any changes to lotions or detergents.     Current Outpatient Medications   Medication Sig Dispense Refill    triamcinolone 0.025 % External Cream Apply 1 Application topically 2 (two) times daily. 15 g 0    fluticasone-salmeterol 250-50 MCG/ACT Inhalation Aerosol Powder, Breath Activated Inhale 1 puff into the lungs Q12H. 180 each 3    clindamycin 1 % External Lotion Apply 1 Application topically daily. 60 mL 3    tretinoin 0.025 % External Cream Apply 1 Application topically nightly. Use 2 pea sized dots for the chest and 2 pea sized dots for the back. 1 pea sized dot for the face. 30 g 3    ketoconazole 2 % External Cream Apply topically to affected sites 2x daily 30 g 2    ipratropium-albuterol 0.5-2.5 (3) MG/3ML Inhalation Solution Take 3 mL by nebulization every 6 (six) hours as needed. 60 mL 0    montelukast 10 MG Oral Tab Take 1 tablet (10 mg total) by mouth nightly. 90 tablet 3    clotrimazole-betamethasone 1-0.05 % External Cream Apply topically to affected sites 2x daily 45 g 1    betamethasone dipropionate 0.05 % External Ointment Apply 1 g topically daily as needed (ear itch). 15 g 0    Rizatriptan Benzoate 10 MG Oral Tab use at onset; may repeat once after 2 hours- ONLY 2 IN 24 HOUR PERIOD MAX.  This is a 30 day supply. 12 tablet 3    fluticasone propionate (FLONASE ALLERGY RELIEF) 50 MCG/ACT Nasal Suspension by Each Nare route daily.      Multiple Vitamin (HIGH POTENCY MULTIVITAMIN) Oral Tab Take 1 tablet by mouth daily. 30 tablet 0    albuterol 108 (90 Base) MCG/ACT Inhalation  Aero Soln Inhale 2 puffs into the lungs every 6 (six) hours as needed for Wheezing or Shortness of Breath. 1 each 1    albuterol (2.5 MG/3ML) 0.083% Inhalation Nebu Soln INHALE 1 VIAL PO VIA NEBULIZER Q 4 H PRN 1 each 0    CALCIUM CITRATE-VITAMIN D OR Take by mouth.      OMEGA-3 FATTY ACIDS OR Take by mouth.      ZYRTEC-D ALLERGY & CONGESTION 5-120 MG Oral Tablet 12 Hr TK 1 T PO 1 TO 2 TIMES A DAY      methylPREDNISolone (MEDROL) 4 MG Oral Tablet Therapy Pack As directed. (Patient not taking: Reported on 2024) 21 tablet 0      Past Medical History:    Allergic rhinitis    Arm vein blood clot    during pregnancy    Asthma (HCC)    Endometriosis    H/O LEEP    HELLP (hemolytic anemia/elev liver enzymes/low platelets in pregnancy) (HCC)    History of DVT (deep vein thrombosis)    Migraines    Visual impairment    glasses      Past Surgical History:   Procedure Laterality Date    Appendectomy  1993           delivery only  2014    Cholecystectomy  2016    Hysterectomy  2015    Laparoscopy,diagnostic  , ,,    ablation of implants      Social History:  Social History     Socioeconomic History    Marital status:    Tobacco Use    Smoking status: Never    Smokeless tobacco: Never   Vaping Use    Vaping status: Never Used   Substance and Sexual Activity    Alcohol use: Yes     Comment: rarely    Drug use: Never    Sexual activity: Not Currently     Partners: Male     Birth control/protection: Hysterectomy   Other Topics Concern    Caffeine Concern Yes     Comment: coffee    Exercise Yes    Breast feeding No    Reaction to local anesthetic No    Pt has a pacemaker No    Pt has a defibrillator No      Family History   Problem Relation Age of Onset    Heart Disorder Father     Hypertension Father     Lipids Father     Cancer Paternal Grandmother     Breast Cancer Other         paternal cousin    Ovarian Cancer Other         paternal cousin    Other (testicular cancer) Other          paternal cousin    Breast Cancer Maternal Cousin Female 40    Ovarian Cancer Maternal Cousin Female 43      Allergies[1]     REVIEW OF SYSTEMS:     Review of Systems   Constitutional:  Negative for fever.   HENT: Negative.     Respiratory:  Negative for cough, shortness of breath and wheezing.    Cardiovascular:  Negative for chest pain.   Gastrointestinal: Negative.    Genitourinary: Negative.    Musculoskeletal: Negative.    Skin:         Redness on bilateral cheeks   Neurological: Negative.    Psychiatric/Behavioral: Negative.        Wt Readings from Last 5 Encounters:   12/16/24 219 lb (99.3 kg)   09/30/24 217 lb (98.4 kg)   07/30/24 215 lb (97.5 kg)   04/23/24 217 lb 9.6 oz (98.7 kg)   04/17/24 214 lb (97.1 kg)     Body mass index is 33.3 kg/m².      EXAM:   /73 (BP Location: Right arm, Patient Position: Sitting, Cuff Size: large)   Pulse 83   Resp 16   Ht 5' 8\" (1.727 m)   Wt 219 lb (99.3 kg)   BMI 33.30 kg/m²     Physical Exam  Vitals reviewed.   Constitutional:       Appearance: Normal appearance.   HENT:      Head: Normocephalic.   Cardiovascular:      Rate and Rhythm: Normal rate and regular rhythm.      Pulses: Normal pulses.   Pulmonary:      Breath sounds: Normal breath sounds. No wheezing.   Musculoskeletal:         General: No swelling. Normal range of motion.   Skin:     General: Skin is warm and dry.      Findings: Erythema present.      Comments: Facial redness and itchiness.   Neurological:      Mental Status: She is alert and oriented to person, place, and time.   Psychiatric:         Mood and Affect: Mood normal.         Behavior: Behavior normal.            ASSESSMENT AND PLAN:   1. Facial dermatitis  - triamcinolone 0.025 % External Cream; Apply 1 Application topically 2 (two) times daily.  Dispense: 15 g; Refill: 0        The patient indicates understanding of these issues and agrees to the plan.  Return for if symptoms do not resolve.         [1]   Allergies  Allergen Reactions     Benzoyl Peroxide FACE FLUSHING, HIVES, RASH and SWELLING    Ciprofloxacin HALLUCINATION

## 2024-12-16 NOTE — PATIENT INSTRUCTIONS
Apply Triamcinolone cream to the face twice a day. Do not use the cream for longer than a week. Make sure to apply a moisturizer over the steroid cream.

## 2024-12-17 ENCOUNTER — TELEPHONE (OUTPATIENT)
Dept: PULMONOLOGY | Facility: CLINIC | Age: 44
End: 2024-12-17

## 2024-12-17 RX ORDER — PREDNISONE 20 MG/1
TABLET ORAL
Qty: 10 TABLET | Refills: 2 | Status: SHIPPED | OUTPATIENT
Start: 2024-12-17

## 2024-12-17 RX ORDER — PREDNISONE 20 MG/1
TABLET ORAL
Qty: 10 TABLET | Refills: 0 | Status: SHIPPED | OUTPATIENT
Start: 2024-12-17 | End: 2024-12-17

## 2024-12-17 NOTE — TELEPHONE ENCOUNTER
Patient calling with complaints of an Asthma flare up.  She states that MD informed her to call when she needs to start steroids.      Current Outpatient Medications:     methylPREDNISolone (MEDROL) 4 MG Oral Tablet Therapy Pack, As directed. (Patient not taking: Reported on 12/16/2024), Disp: 21 tablet, Rfl: 0

## 2024-12-17 NOTE — TELEPHONE ENCOUNTER
Dr. Rodriguez,   Called and spoke to patient. She is experiencing an asthma flare which started this past Saturday. Symptoms are worsening. She is having more SOB, voice is hoarse, intermittent wheezing, no audible wheezing during our conversation. Using her inhaler more frequently. Periodic cough - expectorating white/cloudy colored phlegm. Using humidifier, drinking and eating okay. Sleep pattern is normal for the patient. Advised patient if symptoms worsen to go the ER/Urgent care.  Thank you.

## 2024-12-23 ENCOUNTER — APPOINTMENT (OUTPATIENT)
Dept: GENERAL RADIOLOGY | Facility: HOSPITAL | Age: 44
End: 2024-12-23
Attending: EMERGENCY MEDICINE
Payer: COMMERCIAL

## 2024-12-23 ENCOUNTER — HOSPITAL ENCOUNTER (EMERGENCY)
Facility: HOSPITAL | Age: 44
Discharge: HOME | End: 2024-12-23
Attending: EMERGENCY MEDICINE
Payer: COMMERCIAL

## 2024-12-23 VITALS
DIASTOLIC BLOOD PRESSURE: 85 MMHG | SYSTOLIC BLOOD PRESSURE: 132 MMHG | TEMPERATURE: 99 F | HEIGHT: 68 IN | OXYGEN SATURATION: 96 % | WEIGHT: 218 LBS | RESPIRATION RATE: 18 BRPM | HEART RATE: 101 BPM | BODY MASS INDEX: 33.04 KG/M2

## 2024-12-23 DIAGNOSIS — U07.1 COVID-19: Primary | ICD-10-CM

## 2024-12-23 LAB
ANION GAP SERPL CALC-SCNC: 12 MMOL/L (ref 0–18)
BASOPHILS # BLD AUTO: 0.03 X10(3) UL (ref 0–0.2)
BASOPHILS NFR BLD AUTO: 0.4 %
BUN BLD-MCNC: 12 MG/DL (ref 9–23)
BUN/CREAT SERPL: 14 (ref 10–20)
CALCIUM BLD-MCNC: 10 MG/DL (ref 8.7–10.4)
CHLORIDE SERPL-SCNC: 105 MMOL/L (ref 98–112)
CO2 SERPL-SCNC: 22 MMOL/L (ref 21–32)
CREAT BLD-MCNC: 0.86 MG/DL
D DIMER PPP FEU-MCNC: 0.28 UG/ML FEU (ref ?–0.5)
DEPRECATED RDW RBC AUTO: 40.3 FL (ref 35.1–46.3)
EGFRCR SERPLBLD CKD-EPI 2021: 84 ML/MIN/1.73M2 (ref 60–?)
EOSINOPHIL # BLD AUTO: 0 X10(3) UL (ref 0–0.7)
EOSINOPHIL NFR BLD AUTO: 0 %
ERYTHROCYTE [DISTWIDTH] IN BLOOD BY AUTOMATED COUNT: 13.5 % (ref 11–15)
FLUAV + FLUBV RNA SPEC NAA+PROBE: NEGATIVE
FLUAV + FLUBV RNA SPEC NAA+PROBE: NEGATIVE
GLUCOSE BLD-MCNC: 125 MG/DL (ref 70–99)
HCT VFR BLD AUTO: 42.3 %
HGB BLD-MCNC: 14.2 G/DL
IMM GRANULOCYTES # BLD AUTO: 0.03 X10(3) UL (ref 0–1)
IMM GRANULOCYTES NFR BLD: 0.4 %
LYMPHOCYTES # BLD AUTO: 0.89 X10(3) UL (ref 1–4)
LYMPHOCYTES NFR BLD AUTO: 12.6 %
MCH RBC QN AUTO: 27.5 PG (ref 26–34)
MCHC RBC AUTO-ENTMCNC: 33.6 G/DL (ref 31–37)
MCV RBC AUTO: 81.8 FL
MONOCYTES # BLD AUTO: 0.2 X10(3) UL (ref 0.1–1)
MONOCYTES NFR BLD AUTO: 2.8 %
NEUTROPHILS # BLD AUTO: 5.89 X10 (3) UL (ref 1.5–7.7)
NEUTROPHILS # BLD AUTO: 5.89 X10(3) UL (ref 1.5–7.7)
NEUTROPHILS NFR BLD AUTO: 83.8 %
NT-PROBNP SERPL-MCNC: 111 PG/ML (ref ?–125)
OSMOLALITY SERPL CALC.SUM OF ELEC: 289 MOSM/KG (ref 275–295)
PLATELET # BLD AUTO: 237 10(3)UL (ref 150–450)
POTASSIUM SERPL-SCNC: 3.7 MMOL/L (ref 3.5–5.1)
RBC # BLD AUTO: 5.17 X10(6)UL
RSV RNA SPEC NAA+PROBE: NEGATIVE
SARS-COV-2 RNA RESP QL NAA+PROBE: DETECTED
SODIUM SERPL-SCNC: 139 MMOL/L (ref 136–145)
WBC # BLD AUTO: 7 X10(3) UL (ref 4–11)

## 2024-12-23 PROCEDURE — 71045 X-RAY EXAM CHEST 1 VIEW: CPT | Performed by: EMERGENCY MEDICINE

## 2024-12-23 PROCEDURE — 93010 ELECTROCARDIOGRAM REPORT: CPT

## 2024-12-23 PROCEDURE — 99284 EMERGENCY DEPT VISIT MOD MDM: CPT

## 2024-12-23 PROCEDURE — 36415 COLL VENOUS BLD VENIPUNCTURE: CPT

## 2024-12-23 PROCEDURE — 85379 FIBRIN DEGRADATION QUANT: CPT | Performed by: EMERGENCY MEDICINE

## 2024-12-23 PROCEDURE — 85025 COMPLETE CBC W/AUTO DIFF WBC: CPT | Performed by: EMERGENCY MEDICINE

## 2024-12-23 PROCEDURE — 99285 EMERGENCY DEPT VISIT HI MDM: CPT

## 2024-12-23 PROCEDURE — 94640 AIRWAY INHALATION TREATMENT: CPT

## 2024-12-23 PROCEDURE — 83880 ASSAY OF NATRIURETIC PEPTIDE: CPT | Performed by: EMERGENCY MEDICINE

## 2024-12-23 PROCEDURE — 0241U SARS-COV-2/FLU A AND B/RSV BY PCR (GENEXPERT): CPT | Performed by: EMERGENCY MEDICINE

## 2024-12-23 PROCEDURE — 93005 ELECTROCARDIOGRAM TRACING: CPT

## 2024-12-23 PROCEDURE — 80048 BASIC METABOLIC PNL TOTAL CA: CPT | Performed by: EMERGENCY MEDICINE

## 2024-12-23 RX ORDER — PREDNISONE 20 MG/1
40 TABLET ORAL ONCE
Status: COMPLETED | OUTPATIENT
Start: 2024-12-23 | End: 2024-12-23

## 2024-12-23 RX ORDER — PREDNISONE 20 MG/1
60 TABLET ORAL ONCE
Status: DISCONTINUED | OUTPATIENT
Start: 2024-12-23 | End: 2024-12-23

## 2024-12-23 RX ORDER — IPRATROPIUM BROMIDE AND ALBUTEROL SULFATE 2.5; .5 MG/3ML; MG/3ML
3 SOLUTION RESPIRATORY (INHALATION) ONCE AS NEEDED
Status: COMPLETED | OUTPATIENT
Start: 2024-12-23 | End: 2024-12-23

## 2024-12-23 RX ORDER — PREDNISONE 20 MG/1
TABLET ORAL
Qty: 7 TABLET | Refills: 0 | Status: SHIPPED | OUTPATIENT
Start: 2024-12-23 | End: 2024-12-29

## 2024-12-23 NOTE — ED PROVIDER NOTES
Patient Seen in: [facility] Emergency Department      History     Chief Complaint   Patient presents with    Difficulty Breathing     Stated Complaint: SOB, cough    Subjective:   HPI      44-year-old female with a history of asthma presents to the emergency department for evaluation of shortness of breath and cough.  Patient reports asthma flare over the past 1 to 2 weeks, has been seen by her pulmonologist, Dr. Rodriguez, she has been on steroids and is now titrating down, took her last dose which was 20 mg of prednisone this morning.  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  She reports feeling very short of breath, wheezy, a lot of tightness in her chest when breathing.  She reports she still has burning in her chest but is no longer feeling short of breath or wheezing.    Objective:     Past Medical History:    Allergic rhinitis    Arm vein blood clot    during pregnancy    Asthma (HCC)    Endometriosis    H/O LEEP    HELLP (hemolytic anemia/elev liver enzymes/low platelets in pregnancy) (HCC)    History of DVT (deep vein thrombosis)    Migraines    Visual impairment    glasses              Past Surgical History:   Procedure Laterality Date    Appendectomy             delivery only  2014    Cholecystectomy  2016    Hysterectomy  2015    Laparoscopy,diagnostic  , ,,2005    ablation of implants                Social History     Socioeconomic History    Marital status:    Tobacco Use    Smoking status: Never    Smokeless tobacco: Never   Vaping Use    Vaping status: Never Used   Substance and Sexual Activity    Alcohol use: Yes     Comment: rarely    Drug use: Never    Sexual activity: Not Currently     Partners: Male     Birth control/protection: Hysterectomy   Other Topics Concern    Caffeine Concern Yes     Comment: coffee    Exercise Yes    Breast  feeding No    Reaction to local anesthetic No    Pt has a pacemaker No    Pt has a defibrillator No                  Physical Exam     ED Triage Vitals [12/23/24 1443]   /85   Pulse 115   Resp 20   Temp 99.1 °F (37.3 °C)   Temp src Oral   SpO2 97 %   O2 Device None (Room air)       Current Vitals:   Vital Signs  BP: 132/85  Pulse: 101  Resp: 18  Temp: 99.1 °F (37.3 °C)  Temp src: Oral  MAP (mmHg): 100    Oxygen Therapy  SpO2: 96 %  O2 Device: None (Room air)        Physical Exam  Vitals and nursing note reviewed.   Constitutional:       General: She is not in acute distress.     Appearance: She is well-developed.   HENT:      Head: Normocephalic and atraumatic.   Eyes:      Conjunctiva/sclera: Conjunctivae normal.   Cardiovascular:      Rate and Rhythm: Normal rate and regular rhythm.      Heart sounds: Normal heart sounds.   Pulmonary:      Effort: Pulmonary effort is normal. No respiratory distress.      Breath sounds: Normal breath sounds. No wheezing.      Comments: Bronchospastic cough noted  Abdominal:      General: Bowel sounds are normal. There is no distension.      Palpations: Abdomen is soft.      Tenderness: There is no abdominal tenderness. There is no guarding or rebound.   Musculoskeletal:         General: Normal range of motion.      Cervical back: Normal range of motion and neck supple.   Skin:     General: Skin is warm and dry.      Findings: No rash.   Neurological:      General: No focal deficit present.      Mental Status: She is alert and oriented to person, place, and time.             ED Course     Labs Reviewed   BASIC METABOLIC PANEL (8) - Abnormal; Notable for the following components:       Result Value    Glucose 125 (*)     All other components within normal limits   CBC WITH DIFFERENTIAL WITH PLATELET - Abnormal; Notable for the following components:    Lymphocyte Absolute 0.89 (*)     All other components within normal limits   SARS-COV-2/FLU A AND B/RSV BY PCR (GENEXPERT) -  Abnormal; Notable for the following components:    SARS-CoV-2 (COVID-19) - (GeneXpert) Detected (*)     All other components within normal limits    Narrative:     This test is intended for the qualitative detection and differentiation of SARS-CoV-2, influenza A, influenza B, and respiratory syncytial virus (RSV) viral RNA in nasopharyngeal or nares swabs from individuals suspected of respiratory viral infection consistent with COVID-19 by their healthcare provider. Signs and symptoms of respiratory viral infection due to SARS-CoV-2, influenza, and RSV can be similar.    Test performed using the Xpert Xpress SARS-CoV-2/FLU/RSV (real time RT-PCR)  assay on the GeneXpert instrument, [employer], [place] [address].   This test is being used under the Food and Drug Administration's Emergency Use Authorization.    The authorized Fact Sheet for Healthcare Providers for this assay is available upon request from the laboratory.   D-DIMER - Normal   PRO BETA NATRIURETIC PEPTIDE - Normal     EKG    Rate, intervals and axes as noted on EKG Report.  Rate: 113  Rhythm: Sinus Rhythm  Reading: Sinus tachycardia, no STEMI, stable compared to EKG from 4/15/2024              Imaging Results Available and Reviewed while in ED:   XR CHEST AP PORTABLE  (CPT=71045)   Final Result   PROCEDURE: XR CHEST AP PORTABLE  (CPT=71045)   TIME: 1632.         COMPARISON: [facility], XR CHEST AP PORTABLE (CPT=71045),    4/15/2024, 4:08 PM.       INDICATIONS: Shortness of breath and cough.       TECHNIQUE:   Single view.         FINDINGS:    CARDIAC/VASC: Normal.  No cardiac silhouette abnormality or cardiomegaly.     Unremarkable pulmonary vasculature.    MEDIAST/LAURA:   No visible mass or adenopathy.   LUNGS/PLEURA: Normal.  No significant pulmonary parenchymal abnormalities.     No effusion or pleural thickening.   BONES: No fracture or visible bony lesion.   OTHER: Negative.                     =====   CONCLUSION: No acute  cardiopulmonary process               Dictated by (CST): Panchito Fuentes MD on 12/23/2024 at 4:47 PM        Finalized by (CST): Panchito Fuentes MD on 12/23/2024 at 4:47 PM                   ED Medications Administered:   Medications   ipratropium-albuterol (Duoneb) 0.5-2.5 (3) MG/3ML inhalation solution 3 mL (3 mL Nebulization Given 12/23/24 1635)   predniSONE (Deltasone) tab 40 mg (40 mg Oral Given 12/23/24 1559)           Vitals:    12/23/24 1443 12/23/24 1619 12/23/24 1731   BP: 131/85 137/81 132/85   Pulse: 115 107 101   Resp: 20 18 18   Temp: 99.1 °F (37.3 °C)     TempSrc: Oral     SpO2: 97% 95% 96%   Weight: 98.9 kg     Height: 172.7 cm (5' 8\")       *I personally reviewed and interpreted all ED vitals.           MDM              Medical Decision Making  Differential diagnosis includes but is not limited to asthma exacerbation, bronchitis, pneumonia, PE, pleural effusion, pneumothorax    Patient tested positive for COVID in the ER today, otherwise labs and imaging are unrevealing.  Patient with normal oxygenation at room air, no wheezing, good air movement on lung auscultation.  Patient reports she took her last prednisone of a 3-day course today, will restart on prednisone, discussed this with Dr. Rodriguez as well as Paxlovid, he agrees the patient should be treated with Paxlovid especially given that she is under vaccinated (reports history of severe migraines after the COVID-vaccine and has not received within the past few years) and states last time she had COVID she had severe illness.  Discussed this with the patient in addition to return precautions.  She verbalizes understanding of and agreement with this plan.    Problems Addressed:  COVID-19: acute illness or injury    Amount and/or Complexity of Data Reviewed  External Data Reviewed: labs.     Details: CBC and BMP stable compared to 4/15/2020  Labs: ordered.  Radiology: ordered.  ECG/medicine tests: ordered and independent interpretation performed.  Decision-making details documented in ED Course.  Discussion of management or test interpretation with external provider(s): Discussed with pulmonology    Risk  Prescription drug management.        Disposition and Plan     Clinical Impression:  1. COVID-19         Disposition:  Discharge  12/23/2024  5:26 pm    Follow-up:  Dana Barrett MD  [address] [id number] [phone]    Follow up  As needed    Merrick Rodriguez MD  [address] [facility] [address]  [place] [address]  [phone]    Follow up            Medications Prescribed:  Discharge Medication List as of 12/23/2024  5:32 PM        START taking these medications    Details   nirmatrelvir-ritonavir 300-100 MG Oral Tablet Therapy Pack Take two nirmatrelvir tablets (300mg) with one ritonavir tablet (100mg) together twice daily for 5 days., Normal, Disp-30 tablet, R-0      !! predniSONE 20 MG Oral Tab Take 2 tablets (40 mg total) by mouth daily for 2 days, THEN 1 tablet (20 mg total) daily for 2 days, THEN 0.5 tablets (10 mg total) daily for 2 days., Normal, Disp-7 tablet, R-0      Dextromethorphan-guaiFENesin  MG/15ML Oral Liquid Take 15-30 mL by mouth every 4 to 6 hours as needed (cough)., Normal, Disp-500 mL, R-0       !! - Potential duplicate medications found. Please discuss with provider.              Supplementary Documentation:

## 2024-12-23 NOTE — ED QUICK NOTES
Patient discharged home in no acute distress. A&Ox4, skin p/w/d, denies cp/sob. Ambulating with steady gait and verbalized understanding of d/c instructions and prescriptions.

## 2024-12-23 NOTE — ED INITIAL ASSESSMENT (HPI)
Pt in wheel chair through triage c/o SOB. Started w/ asthma flare up last week. 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 Neb tx and inhaler w/ no relief.

## 2024-12-23 NOTE — DISCHARGE INSTRUCTIONS
Take the medications prescribed to you today as directed.    You had a dose of prednisone in the ER today.  Your next dose is due tomorrow at home.    Stay hydrated, get rest.    Follow CDC guidelines regarding masking and isolation.    Return to the ER if you develop worsening symptoms, difficulty breathing, fainting, or any emergent concerns.

## 2024-12-24 ENCOUNTER — TELEPHONE (OUTPATIENT)
Dept: INTERNAL MEDICINE CLINIC | Facility: HOSPITAL | Age: 44
End: 2024-12-24

## 2024-12-24 LAB
ATRIAL RATE: 113 BPM
P AXIS: 55 DEGREES
P-R INTERVAL: 124 MS
Q-T INTERVAL: 306 MS
QRS DURATION: 76 MS
QTC CALCULATION (BEZET): 419 MS
R AXIS: 53 DEGREES
T AXIS: 27 DEGREES
VENTRICULAR RATE: 113 BPM

## 2024-12-26 ENCOUNTER — NURSE TRIAGE (OUTPATIENT)
Dept: INTERNAL MEDICINE CLINIC | Facility: CLINIC | Age: 44
End: 2024-12-26

## 2024-12-26 NOTE — TELEPHONE ENCOUNTER
Action Requested: Summary for Provider     []  Critical Lab, Recommendations Needed  [] Need Additional Advice  []   FYI    []   Need Orders  [] Need Medications Sent to Pharmacy  []  Other     SUMMARY: Disposition per protocol  is telemedicine visit for ER follow up.  Dr Barrett is out of office. Patient declines first available Edwina LAY provider today, accepts appointment with preferred provider tomorrow:  Future Appointments   Date Time Provider Department Center   12/27/2024  1:40 PM Shelia Castellanos APRN [facility]     Reason for call: Covid  Onset:12/23/24    Seen in [facility] ED 12/23/24  for asthma flare, Covid was Positive, chest Xray with no acute changes. Prescribed Paxlovid and oral steroids. Today she is Afebrile, feeling mild  shortness of breath with intermittent coughing spasms, voice is hoarse without sore throat. Paxlovid tastes bad and upset her stomach somewhat.  Taking medications as prescribed,using home nebulizer, pushing fluids. She has not yet checked peak flows. Reviewed care advice including continue medications as prescribed, push fluids, diet as tolerated, check peak flows prior to telemedicine visit tomorrow, call back or consider ER/ICC if symptoms worsen. Patient verbalizes understanding and agrees to plan of care.    Reason for Disposition   HIGH RISK patient (e.g., weak immune system, age > 64 years, obesity with BMI of 30 or higher, pregnant, chronic lung disease or other chronic medical condition) and COVID symptoms (e.g., cough, fever)  (Exceptions: Already seen by doctor or NP/PA and no new or worsening symptoms.)    Protocols used: Coronavirus (COVID-19) Diagnosed or [id number]

## 2024-12-27 ENCOUNTER — TELEMEDICINE (OUTPATIENT)
Dept: INTERNAL MEDICINE CLINIC | Facility: CLINIC | Age: 44
End: 2024-12-27
Payer: COMMERCIAL

## 2024-12-27 DIAGNOSIS — U07.1 COVID-19: ICD-10-CM

## 2024-12-27 DIAGNOSIS — J45.31 MILD PERSISTENT ASTHMA WITH ACUTE EXACERBATION (HCC): ICD-10-CM

## 2024-12-27 DIAGNOSIS — R12 HEARTBURN: ICD-10-CM

## 2024-12-27 DIAGNOSIS — Z09 HOSPITAL DISCHARGE FOLLOW-UP: Primary | ICD-10-CM

## 2024-12-27 RX ORDER — PANTOPRAZOLE SODIUM 40 MG/1
40 TABLET, DELAYED RELEASE ORAL
Qty: 30 TABLET | Refills: 0 | Status: SHIPPED | OUTPATIENT
Start: 2024-12-27

## 2024-12-27 NOTE — PROGRESS NOTES
Virtual Visit Check-In    ISIS ALBERTS or legal guardian   verbally consents to a Virtual Visit Check-In service on 12/27/2024    Patient understands and accepts financial responsibility for any deductible, co-insurance and/or co-pays associated with this service.    Duration of the service:   Call duration 15 minutes   Video visit     Chief Complaint   Patient presents with    Covid    ER F/U       HPI:    Patient ID: Isis Alberts is a 44 year old female. Patient presents for ER follow up. She has a history of asthma and went to the ER on 12/23 due to having cough and SOB.     EKG showed tachycardia    CBC WNL  D-dimer negative   BMP with elevated glucose  + covid testing in ER  CXR showed no acute cardiopulmonary process    She was discharge home with paxlovid, prednisone taper and cough syrup.     She stopped Paxlovid due to having heartburn, upset stomach and diarrhea. She started the medication on Monday evening and decided to stop the medication yesterday.     Overall she is feeling better. Her breathing has improved. Her peak flow is 600. Her pulse ox is currently 98% on room air. She is having occasional wheezing. No SOB or chest pain. She is hoping to return to work on Monday.       ROS    Review of Systems   Constitutional:  Negative for fever.   HENT:  Positive for congestion and voice change.    Respiratory:  Positive for cough and wheezing. Negative for shortness of breath.    Cardiovascular:  Negative for chest pain.   Gastrointestinal:  Positive for diarrhea.        Heartburn   Genitourinary: Negative.    Musculoskeletal: Negative.    Skin: Negative.    Neurological: Negative.    Psychiatric/Behavioral: Negative.               Current Outpatient Medications   Medication Sig Dispense Refill    pantoprazole 40 MG Oral Tab EC Take 1 tablet (40 mg total) by mouth every morning before breakfast. 30 tablet 0    nirmatrelvir-ritonavir 300-100 MG Oral Tablet Therapy Pack Take two  nirmatrelvir tablets (300mg) with one ritonavir tablet (100mg) together twice daily for 5 days. 30 tablet 0    predniSONE 20 MG Oral Tab Take 2 tablets (40 mg total) by mouth daily for 2 days, THEN 1 tablet (20 mg total) daily for 2 days, THEN 0.5 tablets (10 mg total) daily for 2 days. 7 tablet 0    Dextromethorphan-guaiFENesin  MG/15ML Oral Liquid Take 15-30 mL by mouth every 4 to 6 hours as needed (cough). 500 mL 0    predniSONE 20 MG Oral Tab 2 tabs daily for 3 days then one tab daily until completed 10 tablet 2    triamcinolone 0.025 % External Cream Apply 1 Application topically 2 (two) times daily. 15 g 0    methylPREDNISolone (MEDROL) 4 MG Oral Tablet Therapy Pack As directed. (Patient not taking: Reported on 12/16/2024) 21 tablet 0    fluticasone-salmeterol 250-50 MCG/ACT Inhalation Aerosol Powder, Breath Activated Inhale 1 puff into the lungs Q12H. 180 each 3    clindamycin 1 % External Lotion Apply 1 Application topically daily. 60 mL 3    tretinoin 0.025 % External Cream Apply 1 Application topically nightly. Use 2 pea sized dots for the chest and 2 pea sized dots for the back. 1 pea sized dot for the face. 30 g 3    ketoconazole 2 % External Cream Apply topically to affected sites 2x daily 30 g 2    ipratropium-albuterol 0.5-2.5 (3) MG/3ML Inhalation Solution Take 3 mL by nebulization every 6 (six) hours as needed. 60 mL 0    montelukast 10 MG Oral Tab Take 1 tablet (10 mg total) by mouth nightly. 90 tablet 3    clotrimazole-betamethasone 1-0.05 % External Cream Apply topically to affected sites 2x daily 45 g 1    betamethasone dipropionate 0.05 % External Ointment Apply 1 g topically daily as needed (ear itch). 15 g 0    Rizatriptan Benzoate 10 MG Oral Tab use at onset; may repeat once after 2 hours- ONLY 2 IN 24 HOUR PERIOD MAX.  This is a 30 day supply. 12 tablet 3    fluticasone propionate (FLONASE ALLERGY RELIEF) 50 MCG/ACT Nasal Suspension by Each Nare route daily.      Multiple Vitamin  (HIGH POTENCY MULTIVITAMIN) Oral Tab Take 1 tablet by mouth daily. 30 tablet 0    albuterol 108 (90 Base) MCG/ACT Inhalation Aero Soln Inhale 2 puffs into the lungs every 6 (six) hours as needed for Wheezing or Shortness of Breath. 1 each 1    albuterol (2.5 MG/3ML) 0.083% Inhalation Nebu Soln INHALE 1 VIAL PO VIA NEBULIZER Q 4 H PRN 1 each 0    CALCIUM CITRATE-VITAMIN D OR Take by mouth.      OMEGA-3 FATTY ACIDS OR Take by mouth.      ZYRTEC-D ALLERGY & CONGESTION 5-120 MG Oral Tablet 12 Hr TK 1 T PO 1 TO 2 TIMES A DAY         Allergies:Allergies[1]       Current Outpatient Medications:     pantoprazole 40 MG Oral Tab EC, Take 1 tablet (40 mg total) by mouth every morning before breakfast., Disp: 30 tablet, Rfl: 0    nirmatrelvir-ritonavir 300-100 MG Oral Tablet Therapy Pack, Take two nirmatrelvir tablets (300mg) with one ritonavir tablet (100mg) together twice daily for 5 days., Disp: 30 tablet, Rfl: 0    predniSONE 20 MG Oral Tab, Take 2 tablets (40 mg total) by mouth daily for 2 days, THEN 1 tablet (20 mg total) daily for 2 days, THEN 0.5 tablets (10 mg total) daily for 2 days., Disp: 7 tablet, Rfl: 0    Dextromethorphan-guaiFENesin  MG/15ML Oral Liquid, Take 15-30 mL by mouth every 4 to 6 hours as needed (cough)., Disp: 500 mL, Rfl: 0    predniSONE 20 MG Oral Tab, 2 tabs daily for 3 days then one tab daily until completed, Disp: 10 tablet, Rfl: 2    triamcinolone 0.025 % External Cream, Apply 1 Application topically 2 (two) times daily., Disp: 15 g, Rfl: 0    methylPREDNISolone (MEDROL) 4 MG Oral Tablet Therapy Pack, As directed. (Patient not taking: Reported on 12/16/2024), Disp: 21 tablet, Rfl: 0    fluticasone-salmeterol 250-50 MCG/ACT Inhalation Aerosol Powder, Breath Activated, Inhale 1 puff into the lungs Q12H., Disp: 180 each, Rfl: 3    clindamycin 1 % External Lotion, Apply 1 Application topically daily., Disp: 60 mL, Rfl: 3    tretinoin 0.025 % External Cream, Apply 1 Application topically  nightly. Use 2 pea sized dots for the chest and 2 pea sized dots for the back. 1 pea sized dot for the face., Disp: 30 g, Rfl: 3    ketoconazole 2 % External Cream, Apply topically to affected sites 2x daily, Disp: 30 g, Rfl: 2    ipratropium-albuterol 0.5-2.5 (3) MG/3ML Inhalation Solution, Take 3 mL by nebulization every 6 (six) hours as needed., Disp: 60 mL, Rfl: 0    montelukast 10 MG Oral Tab, Take 1 tablet (10 mg total) by mouth nightly., Disp: 90 tablet, Rfl: 3    clotrimazole-betamethasone 1-0.05 % External Cream, Apply topically to affected sites 2x daily, Disp: 45 g, Rfl: 1    betamethasone dipropionate 0.05 % External Ointment, Apply 1 g topically daily as needed (ear itch)., Disp: 15 g, Rfl: 0    Rizatriptan Benzoate 10 MG Oral Tab, use at onset; may repeat once after 2 hours- ONLY 2 IN 24 HOUR PERIOD MAX.  This is a 30 day supply., Disp: 12 tablet, Rfl: 3    fluticasone propionate (FLONASE ALLERGY RELIEF) 50 MCG/ACT Nasal Suspension, by Each Nare route daily., Disp: , Rfl:     Multiple Vitamin (HIGH POTENCY MULTIVITAMIN) Oral Tab, Take 1 tablet by mouth daily., Disp: 30 tablet, Rfl: 0    albuterol 108 (90 Base) MCG/ACT Inhalation Aero Soln, Inhale 2 puffs into the lungs every 6 (six) hours as needed for Wheezing or Shortness of Breath., Disp: 1 each, Rfl: 1    albuterol (2.5 MG/3ML) 0.083% Inhalation Nebu Soln, INHALE 1 VIAL PO VIA NEBULIZER Q 4 H PRN, Disp: 1 each, Rfl: 0    CALCIUM CITRATE-VITAMIN D OR, Take by mouth., Disp: , Rfl:     OMEGA-3 FATTY ACIDS OR, Take by mouth., Disp: , Rfl:     ZYRTEC-D ALLERGY & CONGESTION 5-120 MG Oral Tablet 12 Hr, TK 1 T PO 1 TO 2 TIMES A DAY, Disp: , Rfl:     Past Medical History:    Allergic rhinitis    Arm vein blood clot    during pregnancy    Asthma (HCC)    Endometriosis    H/O LEEP    HELLP (hemolytic anemia/elev liver enzymes/low platelets in pregnancy) (HCC)    History of DVT (deep vein thrombosis)    Migraines    Visual impairment    glasses          PHYSICAL EXAM:     Vitals signs taken at home if available:    Limited examination for this telephone visit due to the coronavirus emergency    Patient was speaking in complete sentences, no increased work of breathing and very coherent and alert on the phone.  Alert and oriented x 3  Patient was responding to questions appropriately.  Patient did not appear short of breath.    ASSESSMENT/PLAN:     Encounter Diagnoses   Name Primary?    Hospital discharge follow-up Yes    COVID-19     Mild persistent asthma with acute exacerbation (HCC)     Heartburn        1. Hospital discharge follow-up  - hospital notes, labs and imaging reviewed     2. COVID-19  - was prescribed paxlovid but stopped due to having GI symptoms    3. Mild persistent asthma with acute exacerbation (HCC)  - continue prednisone taper   - nebulizer treatments every 6 hours PRN  - monitor oxygen level   - continue to monitor peak flow  - return to ER if have increased SOB or wheezing.     4. Heartburn  - likely due to taking Paxlovid and prednisone  - will prescribed Protonix to use as needed   - pantoprazole 40 MG Oral Tab EC; Take 1 tablet (40 mg total) by mouth every morning before breakfast.  Dispense: 30 tablet; Refill: 0      Patient reminded to practice good health and safety measures including washing hands, social distancing, covering mouth when coughing/ sneezing, avoid social meetings/ gatherings in face of this Covid 19 pandemic.    Patient verbalized understanding of plan and all questions answered to the best of my ability.    Patient to call back if any change/ worsening of symptoms.    No orders of the defined types were placed in this encounter.      Meds This Visit:  Requested Prescriptions     Signed Prescriptions Disp Refills    pantoprazole 40 MG Oral Tab EC 30 tablet 0     Sig: Take 1 tablet (40 mg total) by mouth every morning before breakfast.       Imaging & Referrals:  None               Shelia Castellanos  FILIPE  12/27/2024  1:36 PM      Please note that the following visit was completed using two-way, real-time interactive audio and/or video communication.  This has been done in good brinda to provide continuity of care in the best interest of the provider-patient relationship, due to the ongoing public health crisis/national emergency and because of restrictions of visitation.  There are limitations of this visit as no physical exam could be performed.  Every conscious effort was taken to allow for sufficient and adequate time.  This billing was spent on reviewing labs, medications, radiology tests and decision making.  Appropriate medical decision-making and tests are ordered as detailed in the plan of care above  ID#[id number]       [1]   Allergies  Allergen Reactions    Benzoyl Peroxide FACE FLUSHING, HIVES, RASH and SWELLING    Ciprofloxacin HALLUCINATION

## 2025-01-01 RX ORDER — PANTOPRAZOLE SODIUM 40 MG/1
40 TABLET, DELAYED RELEASE ORAL
Qty: 90 TABLET | Refills: 0 | OUTPATIENT
Start: 2025-01-01

## 2025-01-02 ENCOUNTER — OFFICE VISIT (OUTPATIENT)
Dept: INTERNAL MEDICINE CLINIC | Facility: CLINIC | Age: 45
End: 2025-01-02

## 2025-01-02 ENCOUNTER — HOSPITAL ENCOUNTER (OUTPATIENT)
Dept: GENERAL RADIOLOGY | Facility: HOSPITAL | Age: 45
Discharge: HOME | End: 2025-01-02
Attending: NURSE PRACTITIONER
Payer: COMMERCIAL

## 2025-01-02 VITALS
RESPIRATION RATE: 16 BRPM | DIASTOLIC BLOOD PRESSURE: 85 MMHG | BODY MASS INDEX: 33.19 KG/M2 | HEIGHT: 68 IN | WEIGHT: 219 LBS | SYSTOLIC BLOOD PRESSURE: 127 MMHG | HEART RATE: 64 BPM

## 2025-01-02 DIAGNOSIS — U07.1 COVID-19: ICD-10-CM

## 2025-01-02 DIAGNOSIS — F41.9 ANXIETY: ICD-10-CM

## 2025-01-02 DIAGNOSIS — B37.0 THRUSH: ICD-10-CM

## 2025-01-02 DIAGNOSIS — J45.31 MILD PERSISTENT ASTHMA WITH ACUTE EXACERBATION (HCC): ICD-10-CM

## 2025-01-02 DIAGNOSIS — U07.1 COVID-19: Primary | ICD-10-CM

## 2025-01-02 PROCEDURE — 99214 OFFICE O/P EST MOD 30 MIN: CPT | Performed by: NURSE PRACTITIONER

## 2025-01-02 PROCEDURE — 71046 X-RAY EXAM CHEST 2 VIEWS: CPT | Performed by: NURSE PRACTITIONER

## 2025-01-02 RX ORDER — ESCITALOPRAM OXALATE 5 MG/1
5 TABLET ORAL DAILY
Qty: 30 TABLET | Refills: 0 | Status: SHIPPED | OUTPATIENT
Start: 2025-01-02

## 2025-01-02 RX ORDER — NYSTATIN 100000 [USP'U]/ML
5 SUSPENSION ORAL 4 TIMES DAILY
Qty: 200 ML | Refills: 0 | Status: SHIPPED | OUTPATIENT
Start: 2025-01-02

## 2025-01-02 NOTE — PROGRESS NOTES
Isis Gramajo is a 44 year old female.  Chief Complaint   Patient presents with    Covid     SOB     HPI:   She presents for follow up. She was seen via video visit on 12/27. She went to the ER on 12/23 and was diagnosed with Covid-19.     Patient presents with SOB with exertion. She is getting winded easier. She just finished her steroid taper yesterday. Her Peak flow is at 600. Her oxygen level is 99% on room.   She has not done a breathing treatment today.  He first day back to work was Monday.     She has thrush.     She is also having anxiety. She is not sleeping well. She was previously on medication and seeing a therapist but not currently. She is having increased stress due to her job.       Current Outpatient Medications   Medication Sig Dispense Refill    escitalopram (LEXAPRO) 5 MG Oral Tab Take 1 tablet (5 mg total) by mouth daily. 30 tablet 0    nystatin 637865 UNIT/ML Mouth/Throat Suspension Take 5 mL (500,000 Units total) by mouth 4 (four) times daily. 200 mL 0    pantoprazole 40 MG Oral Tab EC Take 1 tablet (40 mg total) by mouth every morning before breakfast. 30 tablet 0    triamcinolone 0.025 % External Cream Apply 1 Application topically 2 (two) times daily. 15 g 0    fluticasone-salmeterol 250-50 MCG/ACT Inhalation Aerosol Powder, Breath Activated Inhale 1 puff into the lungs Q12H. 180 each 3    ipratropium-albuterol 0.5-2.5 (3) MG/3ML Inhalation Solution Take 3 mL by nebulization every 6 (six) hours as needed. 60 mL 0    montelukast 10 MG Oral Tab Take 1 tablet (10 mg total) by mouth nightly. 90 tablet 3    betamethasone dipropionate 0.05 % External Ointment Apply 1 g topically daily as needed (ear itch). 15 g 0    Rizatriptan Benzoate 10 MG Oral Tab use at onset; may repeat once after 2 hours- ONLY 2 IN 24 HOUR PERIOD MAX.  This is a 30 day supply. 12 tablet 3    fluticasone propionate (FLONASE ALLERGY RELIEF) 50 MCG/ACT Nasal Suspension by Each Nare route daily.      Multiple Vitamin  (HIGH POTENCY MULTIVITAMIN) Oral Tab Take 1 tablet by mouth daily. 30 tablet 0    albuterol 108 (90 Base) MCG/ACT Inhalation Aero Soln Inhale 2 puffs into the lungs every 6 (six) hours as needed for Wheezing or Shortness of Breath. 1 each 1    albuterol (2.5 MG/3ML) 0.083% Inhalation Nebu Soln INHALE 1 VIAL PO VIA NEBULIZER Q 4 H PRN 1 each 0    CALCIUM CITRATE-VITAMIN D OR Take by mouth.      OMEGA-3 FATTY ACIDS OR Take by mouth.      ZYRTEC-D ALLERGY & CONGESTION 5-120 MG Oral Tablet 12 Hr TK 1 T PO 1 TO 2 TIMES A DAY        Past Medical History:    Allergic rhinitis    Arm vein blood clot    during pregnancy    Asthma (HCC)    Endometriosis    H/O LEEP    HELLP (hemolytic anemia/elev liver enzymes/low platelets in pregnancy) (HCC)    History of DVT (deep vein thrombosis)    Migraines    Visual impairment    glasses      Past Surgical History:   Procedure Laterality Date    Appendectomy  1993           delivery only  2014    Cholecystectomy  2016    Hysterectomy  2015    Laparoscopy,diagnostic  , ,,    ablation of implants      Social History:  Social History     Socioeconomic History    Marital status:    Tobacco Use    Smoking status: Never    Smokeless tobacco: Never   Vaping Use    Vaping status: Never Used   Substance and Sexual Activity    Alcohol use: Yes     Comment: rarely    Drug use: Never    Sexual activity: Not Currently     Partners: Male     Birth control/protection: Hysterectomy   Other Topics Concern    Caffeine Concern Yes     Comment: coffee    Exercise Yes    Breast feeding No    Reaction to local anesthetic No    Pt has a pacemaker No    Pt has a defibrillator No      Family History   Problem Relation Age of Onset    Heart Disorder Father     Hypertension Father     Lipids Father     Cancer Paternal Grandmother     Breast Cancer Other         paternal cousin    Ovarian Cancer Other         paternal cousin    Other (testicular cancer) Other          paternal cousin    Breast Cancer Maternal Cousin Female 40    Ovarian Cancer Maternal Cousin Female 43      Allergies[1]     REVIEW OF SYSTEMS:     Review of Systems   Constitutional:  Negative for fever.   HENT:  Negative for congestion and rhinorrhea.    Respiratory:  Positive for cough and shortness of breath (with activity). Negative for wheezing.    Cardiovascular:  Negative for chest pain.   Gastrointestinal:  Negative for abdominal pain.   Genitourinary: Negative.    Musculoskeletal: Negative.    Skin: Negative.    Neurological:  Negative for dizziness and headaches.   Psychiatric/Behavioral:  The patient is nervous/anxious.       Wt Readings from Last 5 Encounters:   01/02/25 219 lb (99.3 kg)   12/23/24 218 lb (98.9 kg)   12/16/24 219 lb (99.3 kg)   09/30/24 217 lb (98.4 kg)   07/30/24 215 lb (97.5 kg)     Body mass index is 33.3 kg/m².      EXAM:   /85 (BP Location: Right arm, Patient Position: Sitting, Cuff Size: large)   Pulse 64   Resp 16   Ht 5' 8\" (1.727 m)   Wt 219 lb (99.3 kg)   BMI 33.30 kg/m²     Physical Exam  Vitals reviewed.   Constitutional:       Appearance: Normal appearance.   HENT:      Head: Normocephalic.   Cardiovascular:      Rate and Rhythm: Normal rate and regular rhythm.      Pulses: Normal pulses.   Pulmonary:      Breath sounds: Normal breath sounds. No wheezing.   Musculoskeletal:         General: No swelling. Normal range of motion.   Skin:     General: Skin is warm and dry.   Neurological:      Mental Status: She is alert and oriented to person, place, and time.   Psychiatric:         Mood and Affect: Mood normal.         Behavior: Behavior normal.            ASSESSMENT AND PLAN:   1. COVID-19  - XR CHEST PA + LAT CHEST (CPT=71046); Future  - note given to excuse patient from work today and tomorrow. She will return on 1/6.     2. Mild persistent asthma with acute exacerbation (HCC)  - finished steroid taper yesterday.   - continue albuterol nebulizer treatments as  needed.   - XR CHEST PA + LAT CHEST (CPT=71046); Future    3. Thrush  - nystatin 995441 UNIT/ML Mouth/Throat Suspension; Take 5 mL (500,000 Units total) by mouth 4 (four) times daily.  Dispense: 200 mL; Refill: 0    4. Anxiety  - escitalopram (LEXAPRO) 5 MG Oral Tab; Take 1 tablet (5 mg total) by mouth daily.  Dispense: 30 tablet; Refill: 0  - [facility] Referral - In Network      The patient indicates understanding of these issues and agrees to the plan.  Return for if symptoms do not resolve.         [1]   Allergies  Allergen Reactions    Benzoyl Peroxide FACE FLUSHING, HIVES, RASH and SWELLING    Ciprofloxacin HALLUCINATION

## 2025-01-29 ENCOUNTER — PATIENT MESSAGE (OUTPATIENT)
Dept: INTERNAL MEDICINE CLINIC | Facility: CLINIC | Age: 45
End: 2025-01-29

## 2025-01-29 DIAGNOSIS — Z00.00 PHYSICAL EXAM: Primary | ICD-10-CM

## 2025-01-30 NOTE — TELEPHONE ENCOUNTER
Please see patient E-mail and advise.   Future Appointments   Date Time Provider Department Center   2/4/2025  5:40 PM Shelia Castellanos APRN [facility]

## 2025-02-03 ENCOUNTER — LAB ENCOUNTER (OUTPATIENT)
Dept: LAB | Facility: HOSPITAL | Age: 45
End: 2025-02-03
Attending: NURSE PRACTITIONER
Payer: COMMERCIAL

## 2025-02-03 DIAGNOSIS — Z90.710 H/O: HYSTERECTOMY: ICD-10-CM

## 2025-02-03 DIAGNOSIS — Z00.00 PHYSICAL EXAM: ICD-10-CM

## 2025-02-03 DIAGNOSIS — L70.0 ACNE VULGARIS: ICD-10-CM

## 2025-02-03 LAB
ALBUMIN SERPL-MCNC: 4.7 G/DL (ref 3.2–4.8)
ALBUMIN/GLOB SERPL: 2 {RATIO} (ref 1–2)
ALP LIVER SERPL-CCNC: 68 U/L
ALT SERPL-CCNC: 10 U/L
ANION GAP SERPL CALC-SCNC: 7 MMOL/L (ref 0–18)
AST SERPL-CCNC: 11 U/L (ref ?–34)
BASOPHILS # BLD AUTO: 0.03 X10(3) UL (ref 0–0.2)
BASOPHILS NFR BLD AUTO: 0.5 %
BILIRUB SERPL-MCNC: 0.4 MG/DL (ref 0.3–1.2)
BUN BLD-MCNC: 15 MG/DL (ref 9–23)
BUN/CREAT SERPL: 17.6 (ref 10–20)
CALCIUM BLD-MCNC: 9 MG/DL (ref 8.7–10.4)
CHLORIDE SERPL-SCNC: 105 MMOL/L (ref 98–112)
CHOLEST SERPL-MCNC: 206 MG/DL (ref ?–200)
CO2 SERPL-SCNC: 26 MMOL/L (ref 21–32)
CREAT BLD-MCNC: 0.85 MG/DL
DEPRECATED RDW RBC AUTO: 40.4 FL (ref 35.1–46.3)
DHEA-S SERPL-MCNC: 50.2 UG/DL
EGFRCR SERPLBLD CKD-EPI 2021: 86 ML/MIN/1.73M2 (ref 60–?)
EOSINOPHIL # BLD AUTO: 0.12 X10(3) UL (ref 0–0.7)
EOSINOPHIL NFR BLD AUTO: 1.9 %
ERYTHROCYTE [DISTWIDTH] IN BLOOD BY AUTOMATED COUNT: 13.1 % (ref 11–15)
EST. AVERAGE GLUCOSE BLD GHB EST-MCNC: 111 MG/DL (ref 68–126)
FASTING PATIENT LIPID ANSWER: YES
FASTING STATUS PATIENT QL REPORTED: YES
GLOBULIN PLAS-MCNC: 2.3 G/DL (ref 2–3.5)
GLUCOSE BLD-MCNC: 94 MG/DL (ref 70–99)
HBA1C MFR BLD: 5.5 % (ref ?–5.7)
HCT VFR BLD AUTO: 41.9 %
HDLC SERPL-MCNC: 54 MG/DL (ref 40–59)
HGB BLD-MCNC: 14.2 G/DL
IMM GRANULOCYTES # BLD AUTO: 0.02 X10(3) UL (ref 0–1)
IMM GRANULOCYTES NFR BLD: 0.3 %
LDLC SERPL CALC-MCNC: 137 MG/DL (ref ?–100)
LYMPHOCYTES # BLD AUTO: 1.76 X10(3) UL (ref 1–4)
LYMPHOCYTES NFR BLD AUTO: 27.8 %
MCH RBC QN AUTO: 28.7 PG (ref 26–34)
MCHC RBC AUTO-ENTMCNC: 33.9 G/DL (ref 31–37)
MCV RBC AUTO: 84.6 FL
MONOCYTES # BLD AUTO: 0.38 X10(3) UL (ref 0.1–1)
MONOCYTES NFR BLD AUTO: 6 %
NEUTROPHILS # BLD AUTO: 4.03 X10 (3) UL (ref 1.5–7.7)
NEUTROPHILS # BLD AUTO: 4.03 X10(3) UL (ref 1.5–7.7)
NEUTROPHILS NFR BLD AUTO: 63.5 %
NONHDLC SERPL-MCNC: 152 MG/DL (ref ?–130)
OSMOLALITY SERPL CALC.SUM OF ELEC: 287 MOSM/KG (ref 275–295)
PLATELET # BLD AUTO: 191 10(3)UL (ref 150–450)
POTASSIUM SERPL-SCNC: 4.1 MMOL/L (ref 3.5–5.1)
PROT SERPL-MCNC: 7 G/DL (ref 5.7–8.2)
RBC # BLD AUTO: 4.95 X10(6)UL
SODIUM SERPL-SCNC: 138 MMOL/L (ref 136–145)
TRIGL SERPL-MCNC: 83 MG/DL (ref 30–149)
TSI SER-ACNC: 2.19 UIU/ML (ref 0.55–4.78)
VLDLC SERPL CALC-MCNC: 15 MG/DL (ref 0–30)
WBC # BLD AUTO: 6.3 X10(3) UL (ref 4–11)

## 2025-02-03 PROCEDURE — 80053 COMPREHEN METABOLIC PANEL: CPT

## 2025-02-03 PROCEDURE — 83036 HEMOGLOBIN GLYCOSYLATED A1C: CPT

## 2025-02-03 PROCEDURE — 84410 TESTOSTERONE BIOAVAILABLE: CPT

## 2025-02-03 PROCEDURE — 85025 COMPLETE CBC W/AUTO DIFF WBC: CPT

## 2025-02-03 PROCEDURE — 83002 ASSAY OF GONADOTROPIN (LH): CPT

## 2025-02-03 PROCEDURE — 83001 ASSAY OF GONADOTROPIN (FSH): CPT

## 2025-02-03 PROCEDURE — 36415 COLL VENOUS BLD VENIPUNCTURE: CPT

## 2025-02-03 PROCEDURE — 80061 LIPID PANEL: CPT

## 2025-02-03 PROCEDURE — 84443 ASSAY THYROID STIM HORMONE: CPT

## 2025-02-03 PROCEDURE — 82627 DEHYDROEPIANDROSTERONE: CPT

## 2025-02-04 ENCOUNTER — OFFICE VISIT (OUTPATIENT)
Dept: INTERNAL MEDICINE CLINIC | Facility: CLINIC | Age: 45
End: 2025-02-04

## 2025-02-04 VITALS
WEIGHT: 214 LBS | BODY MASS INDEX: 32.43 KG/M2 | HEIGHT: 68 IN | HEART RATE: 73 BPM | RESPIRATION RATE: 16 BRPM | SYSTOLIC BLOOD PRESSURE: 111 MMHG | DIASTOLIC BLOOD PRESSURE: 75 MMHG

## 2025-02-04 DIAGNOSIS — Z86.718 HISTORY OF DVT (DEEP VEIN THROMBOSIS): ICD-10-CM

## 2025-02-04 DIAGNOSIS — Z12.11 COLON CANCER SCREENING: ICD-10-CM

## 2025-02-04 DIAGNOSIS — E66.811 OBESITY (BMI 30.0-34.9): ICD-10-CM

## 2025-02-04 DIAGNOSIS — Z00.00 ANNUAL PHYSICAL EXAM: Primary | ICD-10-CM

## 2025-02-04 DIAGNOSIS — E78.5 HYPERLIPIDEMIA, UNSPECIFIED HYPERLIPIDEMIA TYPE: ICD-10-CM

## 2025-02-04 DIAGNOSIS — L30.9 FACIAL DERMATITIS: ICD-10-CM

## 2025-02-04 DIAGNOSIS — Z90.710 H/O: HYSTERECTOMY: ICD-10-CM

## 2025-02-04 DIAGNOSIS — K64.9 HEMORRHOIDS, UNSPECIFIED HEMORRHOID TYPE: ICD-10-CM

## 2025-02-04 DIAGNOSIS — Z12.31 ENCOUNTER FOR SCREENING MAMMOGRAM FOR MALIGNANT NEOPLASM OF BREAST: ICD-10-CM

## 2025-02-04 DIAGNOSIS — J45.30 MILD PERSISTENT ASTHMA WITHOUT COMPLICATION (HCC): ICD-10-CM

## 2025-02-04 DIAGNOSIS — F41.9 ANXIETY: ICD-10-CM

## 2025-02-04 DIAGNOSIS — Z23 ENCOUNTER FOR PREVNAR PNEUMOCOCCAL VACCINATION: ICD-10-CM

## 2025-02-04 LAB
FSH SERPL-ACNC: 46.8 MIU/ML
LH SERPL-ACNC: 34.9 MIU/ML

## 2025-02-04 PROCEDURE — 90677 PCV20 VACCINE IM: CPT | Performed by: NURSE PRACTITIONER

## 2025-02-04 PROCEDURE — 99396 PREV VISIT EST AGE 40-64: CPT | Performed by: NURSE PRACTITIONER

## 2025-02-04 PROCEDURE — 90471 IMMUNIZATION ADMIN: CPT | Performed by: NURSE PRACTITIONER

## 2025-02-04 RX ORDER — HYDROCORTISONE ACETATE 25 MG/1
25 SUPPOSITORY RECTAL 2 TIMES DAILY
Qty: 14 SUPPOSITORY | Refills: 0 | Status: SHIPPED | OUTPATIENT
Start: 2025-02-04 | End: 2025-02-11

## 2025-02-04 RX ORDER — TRIAMCINOLONE ACETONIDE 0.25 MG/G
1 CREAM TOPICAL 2 TIMES DAILY
Qty: 15 G | Refills: 0 | Status: SHIPPED | OUTPATIENT
Start: 2025-02-04

## 2025-02-04 RX ORDER — ESCITALOPRAM OXALATE 5 MG/1
5 TABLET ORAL DAILY
Qty: 90 TABLET | Refills: 0 | Status: SHIPPED | OUTPATIENT
Start: 2025-02-04

## 2025-02-04 NOTE — PROGRESS NOTES
Isis Gramajo is a 44 year old female.  Chief Complaint   Patient presents with    Physical     HPI:   She presents her annual physical exam. She has a history of asthma, endometriosis and hysterectomy.     She is [employer] lead. She is stepping down due to stress.  She started taking Lexapro approximately 1 month ago. She has notice some improvement with mood and sleep.     History of hysterectomy 9/2015  No periods   History of blood clots during pregnancy.    She is having hot flashes at night time.   She would like to have her hormone levels checked.  She did have her annual blood work completed prior to the physical exam.    She does have a history of hemorrhoids.  She states she recently had a flareup secondary to having diarrhea.  She also admits to sitting a lot secondary to her job.  She has been using over-the-counter hydrocortisone cream with little relief.    She has a daughter that is 10 years.  She is .  Current Outpatient Medications   Medication Sig Dispense Refill    triamcinolone 0.025 % External Cream Apply 1 Application topically 2 (two) times daily. 15 g 0    escitalopram (LEXAPRO) 5 MG Oral Tab Take 1 tablet (5 mg total) by mouth daily. 90 tablet 0    hydrocortisone (ANUSOL-HC) 25 MG Rectal Suppos Place 1 suppository (25 mg total) rectally 2 (two) times daily for 7 days. 14 suppository 0    nystatin [id number] UNIT/ML Mouth/Throat Suspension Take 5 mL (500,000 Units total) by mouth 4 (four) times daily. 200 mL 0    pantoprazole 40 MG Oral Tab EC Take 1 tablet (40 mg total) by mouth every morning before breakfast. 30 tablet 0    fluticasone-salmeterol 250-50 MCG/ACT Inhalation Aerosol Powder, Breath Activated Inhale 1 puff into the lungs Q12H. 180 each 3    ipratropium-albuterol 0.5-2.5 (3) MG/3ML Inhalation Solution Take 3 mL by nebulization every 6 (six) hours as needed. 60 mL 0    montelukast 10 MG Oral Tab Take 1 tablet (10 mg total) by mouth nightly. 90 tablet 3    betamethasone  dipropionate 0.05 % External Ointment Apply 1 g topically daily as needed (ear itch). 15 g 0    Rizatriptan Benzoate 10 MG Oral Tab use at onset; may repeat once after 2 hours- ONLY 2 IN 24 HOUR PERIOD MAX.  This is a 30 day supply. 12 tablet 3    fluticasone propionate (FLONASE ALLERGY RELIEF) 50 MCG/ACT Nasal Suspension by Each Nare route daily.      Multiple Vitamin (HIGH POTENCY MULTIVITAMIN) Oral Tab Take 1 tablet by mouth daily. 30 tablet 0    albuterol 108 (90 Base) MCG/ACT Inhalation Aero Soln Inhale 2 puffs into the lungs every 6 (six) hours as needed for Wheezing or Shortness of Breath. 1 each 1    albuterol (2.5 MG/3ML) 0.083% Inhalation Nebu Soln INHALE 1 VIAL PO VIA NEBULIZER Q 4 H PRN 1 each 0    CALCIUM CITRATE-VITAMIN D OR Take by mouth.      OMEGA-3 FATTY ACIDS OR Take by mouth.      ZYRTEC-D ALLERGY & CONGESTION 5-120 MG Oral Tablet 12 Hr TK 1 T PO 1 TO 2 TIMES A DAY        Past Medical History:    Allergic rhinitis    Arm vein blood clot    during pregnancy    Asthma (HCC)    Endometriosis    H/O LEEP    HELLP (hemolytic anemia/elev liver enzymes/low platelets in pregnancy) (HCC)    History of DVT (deep vein thrombosis)    Migraines    Visual impairment    glasses      Past Surgical History:   Procedure Laterality Date    Appendectomy             delivery only  2014    Cholecystectomy  2016    Hysterectomy  2015    Laparoscopy,diagnostic  , ,,2005    ablation of implants      Social History:  Social History     Socioeconomic History    Marital status:    Tobacco Use    Smoking status: Never    Smokeless tobacco: Never   Vaping Use    Vaping status: Never Used   Substance and Sexual Activity    Alcohol use: Yes     Comment: rarely    Drug use: Never    Sexual activity: Not Currently     Partners: Male     Birth control/protection: Hysterectomy   Other Topics Concern    Caffeine Concern Yes     Comment: coffee    Exercise Yes    Breast feeding No    Reaction  to local anesthetic No    Pt has a pacemaker No    Pt has a defibrillator No      Family History   Problem Relation Age of Onset    Heart Disorder Father     Hypertension Father     Lipids Father     Cancer Paternal Grandmother     Breast Cancer Other         paternal cousin    Ovarian Cancer Other         paternal cousin    Other (testicular cancer) Other         paternal cousin    Breast Cancer Maternal Cousin Female 40    Ovarian Cancer Maternal Cousin Female 43      Allergies[1]     REVIEW OF SYSTEMS:     Review of Systems   Constitutional:  Negative for fever.   HENT: Negative.     Respiratory:  Negative for cough, shortness of breath and wheezing.    Cardiovascular:  Negative for chest pain.   Gastrointestinal:  Negative for abdominal pain and constipation.   Genitourinary: Negative.    Musculoskeletal: Negative.    Skin: Negative.    Neurological: Negative.    Psychiatric/Behavioral: Negative.        Wt Readings from Last 5 Encounters:   02/04/25 214 lb (97.1 kg)   01/02/25 219 lb (99.3 kg)   12/23/24 218 lb (98.9 kg)   12/16/24 219 lb (99.3 kg)   09/30/24 217 lb (98.4 kg)     Body mass index is 32.54 kg/m².      EXAM:   /75 (BP Location: Right arm, Patient Position: Sitting, Cuff Size: large)   Pulse 73   Resp 16   Ht 5' 8\" (1.727 m)   Wt 214 lb (97.1 kg)   BMI 32.54 kg/m²     Physical Exam  Vitals reviewed.   Constitutional:       Appearance: Normal appearance.   HENT:      Head: Normocephalic.      Right Ear: Tympanic membrane normal.      Left Ear: Tympanic membrane normal.      Nose: No congestion.      Mouth/Throat:      Pharynx: No posterior oropharyngeal erythema.   Eyes:      Extraocular Movements: Extraocular movements intact.      Pupils: Pupils are equal, round, and reactive to light.   Cardiovascular:      Rate and Rhythm: Normal rate and regular rhythm.      Pulses: Normal pulses.   Pulmonary:      Breath sounds: Normal breath sounds. No wheezing.   Abdominal:      General: Bowel  sounds are normal.      Palpations: Abdomen is soft.      Tenderness: There is no abdominal tenderness.   Musculoskeletal:         General: No swelling. Normal range of motion.      Cervical back: Normal range of motion and neck supple.   Skin:     General: Skin is warm and dry.   Neurological:      Mental Status: She is alert and oriented to person, place, and time.   Psychiatric:         Mood and Affect: Mood normal.         Behavior: Behavior normal.            Component      Latest Ref Rn 2/3/2025   WBC      4.0 - 11.0 x10(3) uL 6.3    RBC      3.80 - 5.30 x10(6)uL 4.95    Hemoglobin      12.0 - 16.0 g/dL 14.2    Hematocrit      35.0 - 48.0 % 41.9    MCV      80.0 - 100.0 fL 84.6    MCH      26.0 - 34.0 pg 28.7    MCHC      31.0 - 37.0 g/dL 33.9    RDW-SD      35.1 - 46.3 fL 40.4    RDW      11.0 - 15.0 % 13.1    Platelet Count      150.0 - 450.0 10(3)uL 191.0    Prelim Neutrophil Abs      1.50 - 7.70 x10 (3) uL 4.03    Neutrophils Absolute      1.50 - 7.70 x10(3) uL 4.03    Lymphocytes Absolute      1.00 - 4.00 x10(3) uL 1.76    Monocytes Absolute      0.10 - 1.00 x10(3) uL 0.38    Eosinophils Absolute      0.00 - 0.70 x10(3) uL 0.12    Basophils Absolute      0.00 - 0.20 x10(3) uL 0.03    Immature Granulocyte Absolute      0.00 - 1.00 x10(3) uL 0.02    Neutrophils %      % 63.5    Lymphocytes %      % 27.8    Monocytes %      % 6.0    Eosinophils %      % 1.9    Basophils %      % 0.5    Immature Granulocyte %      % 0.3    Glucose      70 - 99 mg/dL 94    Sodium      136 - 145 mmol/L 138    Potassium      3.5 - 5.1 mmol/L 4.1    Chloride      98 - 112 mmol/L 105    Carbon Dioxide, Total      21.0 - 32.0 mmol/L 26.0    ANION GAP      0 - 18 mmol/L 7    BUN      9 - 23 mg/dL 15    CREATININE      0.55 - 1.02 mg/dL 0.85    BUN/CREATININE RATIO      10.0 - 20.0  17.6    CALCIUM      8.7 - 10.4 mg/dL 9.0    CALCULATED OSMOLALITY      275 - 295 mOsm/kg 287    EGFR      >=60 mL/min/1.73m2 86    ALT (SGPT)      10 -  49 U/L 10    AST (SGOT)      <34 U/L 11    ALKALINE PHOSPHATASE      39 - 100 U/L 68    Total Bilirubin      0.3 - 1.2 mg/dL 0.4    PROTEIN, TOTAL      5.7 - 8.2 g/dL 7.0    Albumin      3.2 - 4.8 g/dL 4.7    Globulin      2.0 - 3.5 g/dL 2.3    A/G Ratio      1.0 - 2.0  2.0    Patient Fasting for CMP? Yes    Cholesterol, Total      <200 mg/dL 206 (H)    HDL Cholesterol      40 - 59 mg/dL 54    Triglycerides      30 - 149 mg/dL 83    LDL Cholesterol Calc      <100 mg/dL 137 (H)    VLDL      0 - 30 mg/dL 15    NON-HDL CHOLESTEROL      <130 mg/dL 152 (H)    Patient Fasting for Lipid? Yes    HEMOGLOBIN A1c      <5.7 % 5.5    ESTIMATED AVERAGE GLUCOSE      68 - 126 mg/dL 111    TSH      0.550 - 4.780 uIU/mL 2.189       Legend:  (H) High    ASSESSMENT AND PLAN:   1. Annual physical exam  -Blood work completed and discussed with patient at the time of the office visit.    2. H/O: hysterectomy  - FSH [E]; Future  - LH (Luteinizing Hormone) [E]; Future    3. History of DVT (deep vein thrombosis)    4. Encounter for screening mammogram for malignant neoplasm of breast  - [facility] ROSELIA 2D+3D SCREENING BILAT (CPT=77067/29168); Future    5. Facial dermatitis  - triamcinolone 0.025 % External Cream; Apply 1 Application topically 2 (two) times daily.  Dispense: 15 g; Refill: 0    6. Colon cancer screening  - Gastro Referral - [facility])    7. Anxiety  - escitalopram (LEXAPRO) 5 MG Oral Tab; Take 1 tablet (5 mg total) by mouth daily.  Dispense: 90 tablet; Refill: 0    8. Hyperlipidemia, unspecified hyperlipidemia type  -Discussed with patient to monitor her diet and exercise as tolerated.    9. Mild persistent asthma without complication (HCC)  -Stable  -Continue present management  -Continue follow-up with pulmonary as advised.    10. Encounter for Prevnar pneumococcal vaccination  -Prevnar 20 vaccine given in office today    11. Obesity (BMI 30.0-34.9)  -Monitor diet and exercise as tolerated    12. Hemorrhoids,  unspecified hemorrhoid type   - hydrocortisone (ANUSOL-HC) 25 MG Rectal Suppos  -Discussed with patient to prevent straining.  Continue fiber supplement.  Increase water intake.  Avoid sitting for long periods of time.      The patient indicates understanding of these issues and agrees to the plan.  Return in about 1 year (around 2/4/2026).         [1]   Allergies  Allergen Reactions    Benzoyl Peroxide FACE FLUSHING, HIVES, RASH and SWELLING    Ciprofloxacin HALLUCINATION

## 2025-02-09 LAB
SEX HORM BIND GLOB: 29.5 NMOL/L
TESTOST % FREE+WEAK BND: 15.3 %
TESTOST FREE+WEAK BND: 1.5 NG/DL
TESTOSTERONE TOT /MS: 9.5 NG/DL

## 2025-02-10 NOTE — PROGRESS NOTES
Patient informed of test results and all NK's recommendations. Voiced understanding. States her acne is up and down however she just got off steroids and states that that usually causes her to flare up - if acne doesn't resolve in near future, she will CB to schedule appt

## 2025-02-11 ENCOUNTER — OFFICE VISIT (OUTPATIENT)
Dept: PODIATRY CLINIC | Facility: CLINIC | Age: 45
End: 2025-02-11

## 2025-02-11 VITALS — HEART RATE: 77 BPM | DIASTOLIC BLOOD PRESSURE: 84 MMHG | SYSTOLIC BLOOD PRESSURE: 119 MMHG

## 2025-02-11 DIAGNOSIS — M77.52 BURSITIS OF LEFT FOOT: Primary | ICD-10-CM

## 2025-02-11 DIAGNOSIS — L60.0 INGROWN TOENAIL: ICD-10-CM

## 2025-02-11 PROCEDURE — 99214 OFFICE O/P EST MOD 30 MIN: CPT | Performed by: STUDENT IN AN ORGANIZED HEALTH CARE EDUCATION/TRAINING PROGRAM

## 2025-02-11 RX ORDER — TRIAMCINOLONE ACETONIDE 40 MG/ML
40 INJECTION, SUSPENSION INTRA-ARTICULAR; INTRAMUSCULAR ONCE
Status: COMPLETED | OUTPATIENT
Start: 2025-02-11 | End: 2025-02-11

## 2025-02-11 RX ORDER — DEXAMETHASONE SODIUM PHOSPHATE 4 MG/ML
4 VIAL (ML) INJECTION ONCE
Status: COMPLETED | OUTPATIENT
Start: 2025-02-11 | End: 2025-02-11

## 2025-02-11 NOTE — PROGRESS NOTES
[facility]iatry  Progress Note      Isis Gramajo is a 44 year old female.   Chief Complaint   Patient presents with    Foot Pain     Left foot pain- rates pain 8/10 by the end of the day- left hallux ingrown toenail- patient states she had the ingrown removed- its bothering patient-              HPI:     Patient is a pleasant 44-year-old female presents to clinic for evaluation of left foot pain.  Patient admits to painful left lateral foot where she was diagnosed with a bursitis.  She has had cortisone injections in the past with provide her with relief.  Patient would like to have another cortisone injection today.  Patient also admits that she had a left medial partial hallux nail avulsion in the past and relates that the toenail still regrows.    Allergies: Benzoyl peroxide and Ciprofloxacin    Current Outpatient Medications   Medication Sig Dispense Refill    triamcinolone 0.025 % External Cream Apply 1 Application topically 2 (two) times daily. 15 g 0    escitalopram (LEXAPRO) 5 MG Oral Tab Take 1 tablet (5 mg total) by mouth daily. 90 tablet 0    hydrocortisone (ANUSOL-HC) 25 MG Rectal Suppos Place 1 suppository (25 mg total) rectally 2 (two) times daily for 7 days. 14 suppository 0    nystatin [id number] UNIT/ML Mouth/Throat Suspension Take 5 mL (500,000 Units total) by mouth 4 (four) times daily. 200 mL 0    pantoprazole 40 MG Oral Tab EC Take 1 tablet (40 mg total) by mouth every morning before breakfast. 30 tablet 0    fluticasone-salmeterol 250-50 MCG/ACT Inhalation Aerosol Powder, Breath Activated Inhale 1 puff into the lungs Q12H. 180 each 3    ipratropium-albuterol 0.5-2.5 (3) MG/3ML Inhalation Solution Take 3 mL by nebulization every 6 (six) hours as needed. 60 mL 0    montelukast 10 MG Oral Tab Take 1 tablet (10 mg total) by mouth nightly. 90 tablet 3    betamethasone dipropionate 0.05 % External Ointment Apply 1 g topically daily as needed (ear itch). 15 g 0    Rizatriptan Benzoate 10  MG Oral Tab use at onset; may repeat once after 2 hours- ONLY 2 IN 24 HOUR PERIOD MAX.  This is a 30 day supply. 12 tablet 3    fluticasone propionate (FLONASE ALLERGY RELIEF) 50 MCG/ACT Nasal Suspension by Each Nare route daily.      Multiple Vitamin (HIGH POTENCY MULTIVITAMIN) Oral Tab Take 1 tablet by mouth daily. 30 tablet 0    albuterol 108 (90 Base) MCG/ACT Inhalation Aero Soln Inhale 2 puffs into the lungs every 6 (six) hours as needed for Wheezing or Shortness of Breath. 1 each 1    albuterol (2.5 MG/3ML) 0.083% Inhalation Nebu Soln INHALE 1 VIAL PO VIA NEBULIZER Q 4 H PRN 1 each 0    CALCIUM CITRATE-VITAMIN D OR Take by mouth.      OMEGA-3 FATTY ACIDS OR Take by mouth.      ZYRTEC-D ALLERGY & CONGESTION 5-120 MG Oral Tablet 12 Hr TK 1 T PO 1 TO 2 TIMES A DAY        Past Medical History:    Allergic rhinitis    Arm vein blood clot    during pregnancy    Asthma (HCC)    Endometriosis    H/O LEEP    HELLP (hemolytic anemia/elev liver enzymes/low platelets in pregnancy) (HCC)    History of DVT (deep vein thrombosis)    Migraines    Visual impairment    glasses      Past Surgical History:   Procedure Laterality Date    Appendectomy             delivery only  2014    Cholecystectomy  2016    Hysterectomy  2015    Laparoscopy,diagnostic  1998, ,,2005    ablation of implants      Family History   Problem Relation Age of Onset    Heart Disorder Father     Hypertension Father     Lipids Father     Cancer Paternal Grandmother     Breast Cancer Other         paternal cousin    Ovarian Cancer Other         paternal cousin    Other (testicular cancer) Other         paternal cousin    Breast Cancer Maternal Cousin Female 40    Ovarian Cancer Maternal Cousin Female 43      Social History     Socioeconomic History    Marital status:    Tobacco Use    Smoking status: Never    Smokeless tobacco: Never   Vaping Use    Vaping status: Never Used   Substance and Sexual Activity    Alcohol use:  Yes     Comment: rarely    Drug use: Never    Sexual activity: Not Currently     Partners: Male     Birth control/protection: Hysterectomy   Other Topics Concern    Caffeine Concern Yes     Comment: coffee    Exercise Yes    Breast feeding No    Reaction to local anesthetic No    Pt has a pacemaker No    Pt has a defibrillator No           REVIEW OF SYSTEMS:     Denies nause, fever, chills  No calf pain  Denies chest pain or SOB      EXAM:   There were no vitals taken for this visit.  GENERAL: well developed, well nourished, in no apparent distress  EXTREMITIES:   1. Integument: Normal skin temperature and turgor. No nail at left medial hallux nail border at this time.   2. Vascular: Dorsalis pedis two out of four bilateral and posterior tibial pulses two out of   four bilateral, capillary refill normal.   3. Musculoskeletal: All muscle groups are graded 5 out of 5 in the foot and ankle. Pain with palpation along the left 4th MPJ   4. Neurological: Normal sharp dull sensation; reflexes normal.             ASSESSMENT AND PLAN:   Diagnoses and all orders for this visit:    Bursitis of left foot  -     dexamethasone (Decadron) 4 MG/ML injection 4 mg  -     triamcinolone acetonide (Kenalog-40) 40 MG/ML injection 40 mg    Ingrown toenail        Plan:     Patient seen and examined and findings discussed with patient.  Discussed etiology of condition along with treatment options.  Given that patient had relief in the past with cortisone injection I recommend that we proceed with a cortisone injection at this time.  Risk and benefits discussed.  After cleansing the left lateral forefoot a cortisone injection consisting of 1 cc 1% lidocaine plain, 1 cc of dexamethasone and 1 cc of Kenalog 40 was injected to the left foot.  Pressure applied for hemostasis.  Patient tolerated the procedure well.  If signs of infection occur patient to seek immediate medical attention.  Ice left foot.  Resume physical activity as tolerated.   Return to clinic for follow-up in 2 to 3 months.  I also advised patient to let the medial left hallux nail border grow out and to schedule an appointment for a partial nail avulsion with chemical matricectomy.    The patient indicates understanding of these issues and agrees to the plan.        Nae Hurst DPM

## 2025-03-06 ENCOUNTER — HOSPITAL ENCOUNTER (OUTPATIENT)
Dept: GENERAL RADIOLOGY | Age: 45
Discharge: HOME | End: 2025-03-06
Attending: STUDENT IN AN ORGANIZED HEALTH CARE EDUCATION/TRAINING PROGRAM
Payer: COMMERCIAL

## 2025-03-06 DIAGNOSIS — M25.559 HIP PAIN, UNSPECIFIED LATERALITY: ICD-10-CM

## 2025-03-06 PROCEDURE — 73502 X-RAY EXAM HIP UNI 2-3 VIEWS: CPT | Performed by: STUDENT IN AN ORGANIZED HEALTH CARE EDUCATION/TRAINING PROGRAM

## 2025-03-07 ENCOUNTER — TELEPHONE (OUTPATIENT)
Dept: PHYSICAL THERAPY | Facility: HOSPITAL | Age: 45
End: 2025-03-07

## 2025-03-26 ENCOUNTER — HOSPITAL ENCOUNTER (OUTPATIENT)
Age: 45
Discharge: HOME | End: 2025-03-26
Payer: COMMERCIAL

## 2025-03-26 VITALS
SYSTOLIC BLOOD PRESSURE: 111 MMHG | OXYGEN SATURATION: 99 % | TEMPERATURE: 98 F | HEART RATE: 86 BPM | RESPIRATION RATE: 18 BRPM | DIASTOLIC BLOOD PRESSURE: 76 MMHG

## 2025-03-26 DIAGNOSIS — N30.00 ACUTE CYSTITIS WITHOUT HEMATURIA: Primary | ICD-10-CM

## 2025-03-26 LAB
BILIRUB UR QL STRIP: NEGATIVE
COLOR UR: YELLOW
GLUCOSE UR STRIP-MCNC: NEGATIVE MG/DL
KETONES UR STRIP-MCNC: NEGATIVE MG/DL
NITRITE UR QL STRIP: POSITIVE
PH UR STRIP: 5.5 [PH]
PROT UR STRIP-MCNC: 30 MG/DL
SP GR UR STRIP: <=1.005
UROBILINOGEN UR STRIP-ACNC: <2 MG/DL

## 2025-03-26 PROCEDURE — 99213 OFFICE O/P EST LOW 20 MIN: CPT

## 2025-03-26 PROCEDURE — 87088 URINE BACTERIA CULTURE: CPT | Performed by: EMERGENCY MEDICINE

## 2025-03-26 PROCEDURE — 81002 URINALYSIS NONAUTO W/O SCOPE: CPT

## 2025-03-26 PROCEDURE — 87186 SC STD MICRODIL/AGAR DIL: CPT | Performed by: EMERGENCY MEDICINE

## 2025-03-26 PROCEDURE — 99214 OFFICE O/P EST MOD 30 MIN: CPT

## 2025-03-26 PROCEDURE — 87086 URINE CULTURE/COLONY COUNT: CPT | Performed by: EMERGENCY MEDICINE

## 2025-03-26 RX ORDER — PHENAZOPYRIDINE HYDROCHLORIDE 200 MG/1
200 TABLET, FILM COATED ORAL 3 TIMES DAILY PRN
Qty: 6 TABLET | Refills: 0 | Status: SHIPPED | OUTPATIENT
Start: 2025-03-26 | End: 2025-04-02

## 2025-03-26 RX ORDER — CEPHALEXIN 500 MG/1
500 CAPSULE ORAL 2 TIMES DAILY
Qty: 14 CAPSULE | Refills: 0 | Status: SHIPPED | OUTPATIENT
Start: 2025-03-26 | End: 2025-04-02

## 2025-03-26 NOTE — ED PROVIDER NOTES
Patient Seen in: [facility]      History     Chief Complaint   Patient presents with    Urinary Symptoms     Stated Complaint: uti    Subjective:   HPI  Isis Gramajo is a 44 year old female here for urinary frequency, burning with urination over the last few days.  Not concern for STI at this time.  No flank pain, fever, chills, biotics, nausea, vomiting or diarrhea.  Otherwise well-appearing in no acute distress.         Objective:     Past Medical History:    Allergic rhinitis    Arm vein blood clot    during pregnancy    Asthma (HCC)    Endometriosis    H/O LEEP    HELLP (hemolytic anemia/elev liver enzymes/low platelets in pregnancy) (HCC)    History of DVT (deep vein thrombosis)    Migraines    Visual impairment    glasses              Past Surgical History:   Procedure Laterality Date    Appendectomy             delivery only  2014    Cholecystectomy  2016    Hysterectomy  2015    Laparoscopy,diagnostic  , ,,    ablation of implants                Social History     Socioeconomic History    Marital status:    Tobacco Use    Smoking status: Never    Smokeless tobacco: Never   Vaping Use    Vaping status: Never Used   Substance and Sexual Activity    Alcohol use: Yes     Comment: rarely    Drug use: Never    Sexual activity: Not Currently     Partners: Male     Birth control/protection: Hysterectomy   Other Topics Concern    Caffeine Concern Yes     Comment: coffee    Exercise Yes    Breast feeding No    Reaction to local anesthetic No    Pt has a pacemaker No    Pt has a defibrillator No              Review of Systems    Positive for stated complaint: uti  Other systems are as noted in HPI.  Constitutional and vital signs reviewed.      All other systems reviewed and negative except as noted above.    Physical Exam     ED Triage Vitals [25 1720]   /76   Pulse 86   Resp 18   Temp 97.8 °F (36.6 °C)   Temp src    SpO2 99 %   O2 Device  None (Room air)       Current Vitals:   Vital Signs  BP: 111/76  Pulse: 86  Resp: 18  Temp: 97.8 °F (36.6 °C)    Oxygen Therapy  SpO2: 99 %  O2 Device: None (Room air)        Physical Exam  Vitals and nursing note reviewed.   Constitutional:       Appearance: Normal appearance.   Cardiovascular:      Rate and Rhythm: Normal rate.      Pulses: Normal pulses.   Pulmonary:      Effort: Pulmonary effort is normal. No respiratory distress.   Abdominal:      General: Abdomen is flat.      Palpations: Abdomen is soft.      Tenderness: There is no right CVA tenderness, left CVA tenderness or guarding.      Comments: Mild suprapubic pressure.  No focal tenderness.   Musculoskeletal:         General: Normal range of motion.   Skin:     Capillary Refill: Capillary refill takes less than 2 seconds.      Findings: No erythema or rash.   Neurological:      General: No focal deficit present.      Mental Status: She is alert and oriented to person, place, and time.   Psychiatric:         Mood and Affect: Mood normal.         Behavior: Behavior normal.         Thought Content: Thought content normal.         Judgment: Judgment normal.             ED Course     Labs Reviewed   [facility] POCT URINALYSIS DIPSTICK - Abnormal; Notable for the following components:       Result Value    Urine Clarity Cloudy (*)     Protein urine 30 (*)     Blood, Urine Large (*)     Nitrite Urine Positive (*)     Leukocyte esterase urine Large (*)     All other components within normal limits   URINE CULTURE, ROUTINE                   MDM           Medical Decision Making  cystitis vs OAB vs renal stone vs sti vs metabolic vs somatic.    Empiric tx for UTI. Abx sent to pharmacy to take as directed.  Patient is aware that antibiotic would not treat symptoms it will treat the bacterial infection.    Urine dip discussed with pt; we will call with culture results. OTC Pain control as needed. No fever chills body aches or back pain. No N/V/D.     All questions  answered and reinforced ER precautions.  Primary care follow-up as needed.  No acute distress and cleared for discharge         Problems Addressed:  Acute cystitis without hematuria: acute illness or injury    Amount and/or Complexity of Data Reviewed  Labs: ordered. Decision-making details documented in ED Course.     Details: Independent interpretation. Reviewed with patient    Risk  OTC drugs.  Prescription drug management.        Disposition and Plan     Clinical Impression:  1. Acute cystitis without hematuria         Disposition:  Discharge  3/26/2025  5:28 pm    Follow-up:  Daan Barrett MD  [address] [place] [id number]  [phone]                Medications Prescribed:  Current Discharge Medication List        START taking these medications    Details   cephALEXin 500 MG Oral Cap Take 1 capsule (500 mg total) by mouth 2 (two) times daily for 7 days.  Qty: 14 capsule, Refills: 0      phenazopyridine 200 MG Oral Tab Take 1 tablet (200 mg total) by mouth 3 (three) times daily as needed for Pain.  Qty: 6 tablet, Refills: 0                 Supplementary Documentation:

## 2025-03-26 NOTE — ED INITIAL ASSESSMENT (HPI)
Patent arrived ambulatory to room c/o symptoms that started this morning. +urinary frequency/urgency. Possible hematuria. No abdominal pain/back pain. +discomfort when urinating. No fevers. +nausea. No v/d. Easy non labored respirations. No distress.

## 2025-03-26 NOTE — DISCHARGE INSTRUCTIONS
Start antibiotic as indicated and take it every 12 hours until complete.  Pyridium pain analgesic medication sent to the pharmacy.  As we discussed this would make your urine relatively orange.  This can stain clothing, and possible toilet seats.  Make sure that you are white/clean right away.  Make sure that you are urinating before and after intercourse.  Plenty of water, and cranberry tablets.  ER for worsening symptoms

## 2025-04-04 ENCOUNTER — OFFICE VISIT (OUTPATIENT)
Dept: SURGERY | Facility: CLINIC | Age: 45
End: 2025-04-04

## 2025-04-04 DIAGNOSIS — N39.0 RECURRENT UTI: Primary | ICD-10-CM

## 2025-04-04 PROCEDURE — 99214 OFFICE O/P EST MOD 30 MIN: CPT | Performed by: UROLOGY

## 2025-04-04 RX ORDER — SULFAMETHOXAZOLE AND TRIMETHOPRIM 800; 160 MG/1; MG/1
1 TABLET ORAL 2 TIMES DAILY
Qty: 6 TABLET | Refills: 0 | Status: SHIPPED | OUTPATIENT
Start: 2025-04-04 | End: 2025-04-07

## 2025-04-04 RX ORDER — PHENAZOPYRIDINE HYDROCHLORIDE 200 MG/1
200 TABLET, FILM COATED ORAL 2 TIMES DAILY PRN
Qty: 6 TABLET | Refills: 0 | Status: SHIPPED | OUTPATIENT
Start: 2025-04-04

## 2025-04-04 NOTE — PROGRESS NOTES
Claudia Earl MD  Department of Urology  [address] [place] [address] Rd[address]  [place] [address]    T: [phone]  F: [phone]    To: Dana Barrett MD   [address] [place] [id number]    Re: Isis Gramajo   MRN: [id number]  : 1980    Dear Dana Barrett MD,    Today I had the pleasure of seeing Isis Gramajo in my clinic. As you know, Ms. Easton Gramajo is a pleasant 44 year old year old female who I am seeing for UTI. Patient was last seen in this department on 2024.    Briefly, patient states that she has had recurrent urinary tract infections.  I see 1 positive culture on 3/26/2025.  Still reports persistent symptoms.  There is no other positive cultures in the system       PAST MEDICAL HISTORY:  Past Medical History:    Allergic rhinitis    Arm vein blood clot    during pregnancy    Asthma (HCC)    Endometriosis    H/O LEEP    HELLP (hemolytic anemia/elev liver enzymes/low platelets in pregnancy) (HCC)    History of DVT (deep vein thrombosis)    Migraines    Visual impairment    glasses        PAST SURGICAL HISTORY:  Past Surgical History:   Procedure Laterality Date    Appendectomy  1993           delivery only  2014    Cholecystectomy  2016    Hysterectomy  2015    Laparoscopy,diagnostic  1998, ,,    ablation of implants         ALLERGIES:  Allergies[1]      MEDICATIONS:  Current Outpatient Medications   Medication Instructions    albuterol (2.5 MG/3ML) 0.083% Inhalation Nebu Soln INHALE 1 VIAL PO VIA NEBULIZER Q 4 H PRN    albuterol 108 (90 Base) MCG/ACT Inhalation Aero Soln 2 puffs, Inhalation, Every 6 hours PRN    betamethasone dipropionate (DIPROSONE) 1 g, Topical, DAILY PRN    CALCIUM CITRATE-VITAMIN D OR Take by mouth.    escitalopram (LEXAPRO) 5 mg, Oral, Daily    fluticasone propionate (FLONASE ALLERGY RELIEF) 50 MCG/ACT Nasal Suspension Daily    fluticasone-salmeterol 250-50 MCG/ACT Inhalation Aerosol Powder, Breath Activated 1 puff,  Inhalation, Every 12 Hours    ipratropium-albuterol 0.5-2.5 (3) MG/3ML Inhalation Solution 3 mL, Nebulization, Every 6 hours PRN    montelukast (SINGULAIR) 10 mg, Oral, Nightly    Multiple Vitamin (HIGH POTENCY MULTIVITAMIN) Oral Tab 1 tablet, Daily    nystatin (MYCOSTATIN) 500,000 Units, Oral, 4 times daily    OMEGA-3 FATTY ACIDS OR Take by mouth.    pantoprazole (PROTONIX) 40 mg, Oral, Every morning before breakfast    Rizatriptan Benzoate 10 MG Oral Tab use at onset; may repeat once after 2 hours- ONLY 2 IN 24 HOUR PERIOD MAX.  This is a 30 day supply.    triamcinolone 0.025 % External Cream 1 Application, Topical, 2 times daily    ZYRTEC-D ALLERGY & CONGESTION 5-120 MG Oral Tablet 12 Hr TK 1 T PO 1 TO 2 TIMES A DAY        FAMILY HISTORY:  Family History   Problem Relation Age of Onset    Heart Disorder Father     Hypertension Father     Lipids Father     Cancer Paternal Grandmother     Breast Cancer Other         paternal cousin    Ovarian Cancer Other         paternal cousin    Other (testicular cancer) Other         paternal cousin    Breast Cancer Maternal Cousin Female 40    Ovarian Cancer Maternal Cousin Female 43        SOCIAL HISTORY:  Social History     Socioeconomic History    Marital status:    Tobacco Use    Smoking status: Never    Smokeless tobacco: Never   Vaping Use    Vaping status: Never Used   Substance and Sexual Activity    Alcohol use: Yes     Comment: rarely    Drug use: Never    Sexual activity: Not Currently     Partners: Male     Birth control/protection: Hysterectomy   Other Topics Concern    Caffeine Concern Yes     Comment: coffee    Exercise Yes    Breast feeding No    Reaction to local anesthetic No    Pt has a pacemaker No    Pt has a defibrillator No          PHYSICAL EXAMINATION:  There were no vitals filed for this visit.  CONSTITUTIONAL: No apparent distress, cooperative and communicative  NEUROLOGIC: Alert and oriented   HEAD: Normocephalic, atraumatic   EYES: Sclera  non-icteric   ENT: Hearing intact, moist mucous membranes   NECK: No obvious goiter or masses   RESPIRATORY: Normal respiratory effort, Nonlabored breathing on room air  SKIN: No evident rashes   ABDOMEN: Soft, nontender, nondistended, no rebound tenderness, no guarding, no masses      REVIEW OF SYSTEMS:    A comprehensive 10-point review of systems was completed.  Pertinent positives and negatives are noted in the the HPI.       LABORATORY DATA:  URINE CULTURE >100,000 CFU/ML Escherichia coli Abnormal         Resulting Agency: [facility] ([facility])     Susceptibility     Escherichia coli     Not Specified    Ampicillin >=32 Resistant    Ampicillin + Sulbactam 16 Intermediate    Cefazolin <=4 Sensitive    Ciprofloxacin <=0.25 Sensitive    Gentamicin <=1 Sensitive    Levofloxacin <=0.12 Sensitive    Meropenem <=0.25 Sensitive    Nitrofurantoin <=16 Sensitive    Piperacillin + Tazobactam <=4 Sensitive    Trimethoprim/Sulfa <=20 Sensitive                      IMAGING REVIEW:  Narrative   PROCEDURE: XR HIP W OR WO PELVIS 2 OR 3 VIEWS, LEFT (CPT=73502)     COMPARISON: None.     INDICATIONS: Chronic Lt hip pain.     TECHNIQUE: 3 views were obtained.       FINDINGS:  Bone mineralization is normal.     There is no acute fracture/dislocation.     There are slight symmetric degenerative changes within both hips manifested by slight bony hypertrophy and subarticular sclerosis.               Impression   CONCLUSION: Slight symmetric degenerative changes within both hips.           Dictated by (CST): Panchito Fuentes MD on 3/11/2025 at 3:04 PM      Finalized by (CST): Panchito Fuentes MD on 3/11/2025 at 3:05 PM              OTHER RELEVANT DATA:   none     IMPRESSION: Isolated urinary tract infection with persistent symptoms-recommend UA reflex to culture to evaluate for clearance.  Also recommend behavioral management as listed below.  If no improvement can consider cystoscopy and CT scan    We talked about UTI prevention with  continuing good hydration, starting a women's probiotic (bottle should say women's, vaginal, genitourinary; main ingredient should be lactobacillus), Cranberry pills (Ellura, Utiva, Crancap -all are found on [employer] on their respective website; they should have 36 mg PAC), bowel regimen (colace, senna, miralax), voiding before and after sexual activity and using pH balanced soaps. Can continue to check urine and treat when UCx is positive. If this persists,  can consider initiating low dose antibiotic prophylaxis for 6 months versus gentamicin irrigations if she would like a more local therapy.      Continuous antimicrobial prophylaxis regimens for women with recurrent UTIs have been recommended by several trials.  The dosing options for continuous prophylaxis includes the following:    TMP 100mg once daily  TMO-SMX 40mg/200mg once daily  TMP-SMX 40mg 200mg thrice weekly  Nitrofurantoin monohydrate/macrocrystals 50mg daily  Nitrofurantoin onohydrate/macrocrystals 100mg daily  Cephalexin 125mg once daily  Cephalexin 250mg once daily  Fosfomycin 3g every 10 days     These regimens can continue for 3 to 6 months.     Recommended instructions for antibiotic prophylaxis related to sexual intercourse include taking a single dose of antibiotic immediately before or after sexual intercourse.  Dosing options for prophylaxis includes the following:     TMP-SMX 40mg/200mg  TMP-SMX 80mg/400mg  Nitrofurantoin monohydrate/macrocrystals 50mg - 100mg  Cephalexin 250mg           PLAN:  UA reflex to culture  Behavioral management  Bactrim x 3 days empiric  Pyridium prn    Thank you for referring this very pleasant patient to my clinic. If you have any questions or concerns, please do not hesitate to contact me.    Sincerely,  Claudia Earl MD    30 minutes were spent on this patient at this visit obtaining a history, reviewing medical records, developing a treatment plan, counseling and discussing treatment strategy with patient,  coordination of care and documentation.     The 21st Century Cures Act makes medical notes available to patients in the interest of transparency.  However, please be advised that this is a medical document.  It is intended as a peer to peer communication.  It is written in medical language and may contain abbreviations or verbiage that are technical and unfamiliar.  It may appear blunt or direct.  Medical documents are intended to carry relevant information, facts as evident, and the clinical opinion of the practitioner.         [1]   Allergies  Allergen Reactions    Benzoyl Peroxide FACE FLUSHING, HIVES, RASH and SWELLING    Ciprofloxacin HALLUCINATION

## 2025-04-08 ENCOUNTER — OFFICE VISIT (OUTPATIENT)
Dept: OBGYN CLINIC | Facility: CLINIC | Age: 45
End: 2025-04-08
Payer: COMMERCIAL

## 2025-04-08 VITALS
DIASTOLIC BLOOD PRESSURE: 71 MMHG | WEIGHT: 213.81 LBS | HEART RATE: 73 BPM | SYSTOLIC BLOOD PRESSURE: 104 MMHG | BODY MASS INDEX: 33 KG/M2

## 2025-04-08 DIAGNOSIS — N95.1 MENOPAUSAL SYMPTOMS: ICD-10-CM

## 2025-04-08 DIAGNOSIS — N89.8 VAGINAL ITCHING: Primary | ICD-10-CM

## 2025-04-08 DIAGNOSIS — Z11.3 SCREENING EXAMINATION FOR STI: ICD-10-CM

## 2025-04-08 PROCEDURE — 99214 OFFICE O/P EST MOD 30 MIN: CPT | Performed by: NURSE PRACTITIONER

## 2025-04-08 RX ORDER — FLUCONAZOLE 150 MG/1
150 TABLET ORAL AS DIRECTED
Qty: 2 TABLET | Refills: 0 | Status: SHIPPED | OUTPATIENT
Start: 2025-04-08

## 2025-04-08 RX ORDER — ESCITALOPRAM OXALATE 10 MG/1
10 TABLET ORAL DAILY
Qty: 30 TABLET | Refills: 2 | Status: SHIPPED | OUTPATIENT
Start: 2025-04-08

## 2025-04-08 NOTE — PROGRESS NOTES
The following individual(s) verbally consented to be recorded using ambient AI listening technology and understand that they can each withdraw their consent to this listening technology at any point by asking the clinician to turn off or pause the recording:    Patient name: Isis Easton Gramajo

## 2025-04-08 NOTE — PROGRESS NOTES
[facility]   Obstetrics and Gynecology    Isis Gramajo is a 44 year old female  No LMP recorded. Patient has had a hysterectomy.   Chief Complaint   Patient presents with    ER F/U     UTI/MENOPAUSE   . Last seen in 2023.  History of Present Illness  She was seen in urgent care on  for a urinary tract infection and completed a course of antibiotics. By the following Friday, she began experiencing symptoms suggestive of a recurrence. A urine test was conducted (results pending) and she was prescribed a three-day course of Bactrim along with Pyridium for pain relief by urology. The urgency has subsided to her baseline level, but she experiences vaginal irritation, likely due to antibiotic use. No abnormal discharge or odor, but the area is described as 'very irritated' and 'itchy'. She attempted a one-day OTC treatment for yeast infection without complete relief. This is her first UTI in a long time, and she associates it with recent intercourse.    She reports menopausal symptoms including hot flashes, night sweats, and weight gain over the past six months to a year. She experiences disrupted sleep, waking up sweating, and describes herself as a 'furnace' at night. She has a history of hormonally related blood clots during pregnancy, which limits her treatment options for menopausal symptoms. She started Lexapro in January, initially taking it daily and then every other day. She reports improvement in emotional symptoms, trying to wean off lexapro now. Unsure if helped with her menopausal symptoms.     No abnormal vaginal discharge, odor, pain, discomfort, or dryness with intercourse. No smoking or alcohol use.  New partner, desires sti testing    Hx of endometriosis (used lupron in past)  S/p Hysterectomy due to endometriosis ; still has ovaries  Hx of infertility due to endo  Hx of DVT       Pap:     3/2021 NILM, neg HPV  leep in   Contraception: s/p hysterectomy in    Mammo: 2024 normal; has order  Colonoscopy:  normal     OBSTETRICS HISTORY:  OB History    Para Term  AB Living   1 1 1 0 0 1   SAB IAB Ectopic Multiple Live Births   0 0 0 0 1   Obstetric Comments   HELLP syndrome       GYNE HISTORY:  Use of Birth Control (if yes, specify type): Hysterectomy (2025 11:45 AM)  Pap Date: 21 (2025 11:45 AM)  Pap Result Notes: Pap neg, HPV Neg //// MAMMO 10-11-22 MICHAEL Neg //// ANNUAL 3-13-23 EMB (2025 11:45 AM)      History   Sexual Activity    Sexual activity: Not Currently    Partners: Male    Birth control/ protection: Hysterectomy       MEDICAL HISTORY:  Past Medical History:    Allergic rhinitis    Arm vein blood clot    during pregnancy    Asthma (HCC)    Endometriosis    H/O LEEP    HELLP (hemolytic anemia/elev liver enzymes/low platelets in pregnancy) (HCC)    History of DVT (deep vein thrombosis)    Migraines    Visual impairment    glasses       SOCIAL HISTORY:  Social History     Socioeconomic History    Marital status:      Spouse name: Not on file    Number of children: Not on file    Years of education: Not on file    Highest education level: Not on file   Occupational History    Not on file   Tobacco Use    Smoking status: Never    Smokeless tobacco: Never   Vaping Use    Vaping status: Never Used   Substance and Sexual Activity    Alcohol use: Not Currently     Comment: rarely    Drug use: Never    Sexual activity: Not Currently     Partners: Male     Birth control/protection: Hysterectomy   Other Topics Concern     Service Not Asked    Blood Transfusions Not Asked    Caffeine Concern Yes     Comment: coffee    Occupational Exposure Not Asked    Hobby Hazards Not Asked    Sleep Concern Not Asked    Stress Concern Not Asked    Weight Concern Not Asked    Special Diet Not Asked    Back Care Not Asked    Exercise Yes    Bike Helmet Not Asked    Seat Belt Not Asked    Self-Exams Not Asked    Grew up on a farm Not  Asked    History of tanning Not Asked    Outdoor occupation Not Asked    Breast feeding No    Reaction to local anesthetic No    Pt has a pacemaker No    Pt has a defibrillator No   Social History Narrative    Not on file     Social Drivers of Health     Food Insecurity: Not on file   Transportation Needs: Not on file   Stress: Not on file   Housing Stability: Not on file       MEDICATIONS:    Current Outpatient Medications:     escitalopram (LEXAPRO) 10 MG Oral Tab, Take 1 tablet (10 mg total) by mouth daily., Disp: 30 tablet, Rfl: 2    fluconazole (DIFLUCAN) 150 MG Oral Tab, Take 1 tablet (150 mg total) by mouth As Directed. Take one tablet by mouth today, repeat one tablet by mouth in 3 days, Disp: 2 tablet, Rfl: 0    triamcinolone 0.025 % External Cream, Apply 1 Application topically 2 (two) times daily., Disp: 15 g, Rfl: 0    nystatin 716157 UNIT/ML Mouth/Throat Suspension, Take 5 mL (500,000 Units total) by mouth 4 (four) times daily., Disp: 200 mL, Rfl: 0    fluticasone-salmeterol 250-50 MCG/ACT Inhalation Aerosol Powder, Breath Activated, Inhale 1 puff into the lungs Q12H., Disp: 180 each, Rfl: 3    ipratropium-albuterol 0.5-2.5 (3) MG/3ML Inhalation Solution, Take 3 mL by nebulization every 6 (six) hours as needed., Disp: 60 mL, Rfl: 0    montelukast 10 MG Oral Tab, Take 1 tablet (10 mg total) by mouth nightly., Disp: 90 tablet, Rfl: 3    betamethasone dipropionate 0.05 % External Ointment, Apply 1 g topically daily as needed (ear itch)., Disp: 15 g, Rfl: 0    Rizatriptan Benzoate 10 MG Oral Tab, use at onset; may repeat once after 2 hours- ONLY 2 IN 24 HOUR PERIOD MAX.  This is a 30 day supply., Disp: 12 tablet, Rfl: 3    fluticasone propionate (FLONASE ALLERGY RELIEF) 50 MCG/ACT Nasal Suspension, by Each Nare route daily., Disp: , Rfl:     Multiple Vitamin (HIGH POTENCY MULTIVITAMIN) Oral Tab, Take 1 tablet by mouth daily., Disp: 30 tablet, Rfl: 0    albuterol 108 (90 Base) MCG/ACT Inhalation Aero Soln,  Inhale 2 puffs into the lungs every 6 (six) hours as needed for Wheezing or Shortness of Breath., Disp: 1 each, Rfl: 1    albuterol (2.5 MG/3ML) 0.083% Inhalation Nebu Soln, INHALE 1 VIAL PO VIA NEBULIZER Q 4 H PRN, Disp: 1 each, Rfl: 0    CALCIUM CITRATE-VITAMIN D OR, Take by mouth., Disp: , Rfl:     OMEGA-3 FATTY ACIDS OR, Take by mouth., Disp: , Rfl:     ZYRTEC-D ALLERGY & CONGESTION 5-120 MG Oral Tablet 12 Hr, TK 1 T PO 1 TO 2 TIMES A DAY, Disp: , Rfl:     phenazopyridine (PYRIDIUM) 200 MG Oral Tab, Take 1 tablet (200 mg total) by mouth 2 (two) times daily as needed for Pain. (Patient not taking: Reported on 4/8/2025), Disp: 6 tablet, Rfl: 0    pantoprazole 40 MG Oral Tab EC, Take 1 tablet (40 mg total) by mouth every morning before breakfast. (Patient not taking: Reported on 4/8/2025), Disp: 30 tablet, Rfl: 0    ALLERGIES:  Allergies[1]      Review of Systems:  Constitutional:  Denies fatigue, +night sweats, +hot flashes, +poor sleep  Cardiovascular:  denies chest pain or palpitations  Respiratory:  denies shortness of breath  Gastrointestinal:  denies heartburn, abdominal pain, diarrhea or constipation  Genitourinary:  denies dysuria, incontinence, +abnormal vaginal discharge, +vaginal itching   Musculoskeletal:  denies back pain.  Skin/Breast:  Denies any breast pain, lumps, or discharge.   Neurological:  denies headaches, extremity weakness or numbness.  Psychiatric: denies depression or anxiety.  Endocrine:   denies excessive thirst or urination.  Heme/Lymph:  denies history of anemia, easy bruising or bleeding.      PHYSICAL EXAM:     Vitals:    04/08/25 1151   BP: 104/71   Pulse: 73   Weight: 213 lb 12.8 oz (97 kg)     Body mass index is 32.51 kg/m².     Patient offered chaperone, patient declined    Constitutional: well developed, well nourished    Psychiatric:  Oriented to time, place, person and situation. Appropriate mood and affect    Pelvic Exam:  External Genitalia: normal appearance, hair  distribution, and no lesions  Urethral Meatus:  normal in size, location, without lesions and prolapse  Bladder:  No fullness, masses or tenderness  Vagina:  Normal appearance without lesions, +thick white abnormal discharge  Cervix:  Normal without tenderness on motion  Uterus: normal in size, contour, position, mobility, without tenderness  Adnexa: normal without masses or tenderness  Perineum: normal  Anus: no hemorroids   Lymph node: no inguinal lymph nodes    Results      Assessment & Plan:    ICD-10-CM    1. Vaginal itching  N89.8 Vaginitis Vaginosis PCR Panel     Vaginitis Vaginosis PCR Panel     fluconazole (DIFLUCAN) 150 MG Oral Tab      2. Screening examination for STI  Z11.3 Chlamydia/Gc Amplification     Vaginitis Vaginosis PCR Panel     Vaginitis Vaginosis PCR Panel     Chlamydia/Gc Amplification      3. Menopausal symptoms  N95.1         Assessment & Plan  Urinary Tract Infection (UTI)  Seeing urology - symptoms improved    Vaginal Irritation  Likely secondary to antibiotic use.   - Perform culture for yeast infection. Will treat for yeast on exam.  -desires sti testing  - Consider probiotics to restore vaginal franko.  - Discuss non-hormonal options like Revaree    Menopausal Symptoms  Experiencing hot flashes, night sweats, and sleep disturbances. Hormone therapy limited by history of DVT. Lexapro may help with vasomotor symptoms. Veozah requires monitoring of liver and kidney functions.  - Increase Lexapro to 5 mg daily for two weeks, consider 10 mg if symptoms persist.  - Provided information on Veozah as an alternative.  - Discuss lifestyle modifications: increased protein, strength training, fiber intake.  - Schedule follow-up in 4-6 week for annual      FILIPE Matta        This note was prepared using Dragon Medical voice recognition dictation software. As a result errors may occur. When identified these errors have been corrected. While every attempt is made to correct errors during  dictation discrepancies may still exist.         [1]   Allergies  Allergen Reactions    Benzoyl Peroxide FACE FLUSHING, HIVES, RASH and SWELLING    Ciprofloxacin HALLUCINATION

## 2025-04-09 LAB
C TRACH DNA SPEC QL NAA+PROBE: NEGATIVE
N GONORRHOEA DNA SPEC QL NAA+PROBE: NEGATIVE

## 2025-04-10 ENCOUNTER — PATIENT MESSAGE (OUTPATIENT)
Dept: OBGYN CLINIC | Facility: CLINIC | Age: 45
End: 2025-04-10

## 2025-04-10 LAB
BV BACTERIA DNA VAG QL NAA+PROBE: POSITIVE
C GLABRATA DNA VAG QL NAA+PROBE: NEGATIVE
C KRUSEI DNA VAG QL NAA+PROBE: NEGATIVE
CANDIDA DNA VAG QL NAA+PROBE: POSITIVE
T VAGINALIS DNA VAG QL NAA+PROBE: NEGATIVE

## 2025-04-11 RX ORDER — FLUCONAZOLE 150 MG/1
150 TABLET ORAL ONCE
Qty: 1 TABLET | Refills: 0 | Status: SHIPPED | OUTPATIENT
Start: 2025-04-11 | End: 2025-04-11

## 2025-04-11 NOTE — TELEPHONE ENCOUNTER
Message to Massiel Briceño.  Patient was seen on 4/8/2025 and swabs were done for bacterial vaginosis and yeast.  Prescription for diflucan x2 was sent that day.  Patient took the diflucan 3 days apart before getting message regarding the bacterial vaginosis and flagyl.  Massiel Briceño's recommendations were to take the second diflucan after finishing the flagyl.  Patient requesting one more diflucan to be taken after finishing the flagyl.  Message to Massiel Briceño for recommendations.

## 2025-04-16 DIAGNOSIS — B37.0 THRUSH: ICD-10-CM

## 2025-04-17 ENCOUNTER — TELEMEDICINE (OUTPATIENT)
Dept: INTERNAL MEDICINE CLINIC | Facility: CLINIC | Age: 45
End: 2025-04-17
Payer: COMMERCIAL

## 2025-04-17 ENCOUNTER — NURSE TRIAGE (OUTPATIENT)
Dept: INTERNAL MEDICINE CLINIC | Facility: CLINIC | Age: 45
End: 2025-04-17

## 2025-04-17 DIAGNOSIS — B37.0 THRUSH: ICD-10-CM

## 2025-04-17 PROCEDURE — 98005 SYNCH AUDIO-VIDEO EST LOW 20: CPT | Performed by: NURSE PRACTITIONER

## 2025-04-17 RX ORDER — NYSTATIN 100000 [USP'U]/ML
5 SUSPENSION ORAL 4 TIMES DAILY
Qty: 280 ML | Refills: 0 | Status: SHIPPED | OUTPATIENT
Start: 2025-04-17

## 2025-04-17 NOTE — PROGRESS NOTES
Virtual Visit Check-In    ISIS ALBERTS or legal guardian   verbally consents to a Virtual Visit Check-In service on 4/17/2025    Patient understands and accepts financial responsibility for any deductible, co-insurance and/or co-pays associated with this service.    Duration of the service:   Call duration 10 minutes   Video visit     Chief Complaint   Patient presents with    Thrush       HPI:    Patient ID: Isis Alberts is a 44 year old female. She presents with thrush. She states symptoms started about one week ago. Change in taste, tongue is coated and roof of her mouth is irritated. She is currently on antibiotics but almost finished. She was positive for a UTI. Then also tested positive for BV and candida.       ROS    Review of Systems   Constitutional:  Negative for fever.   HENT:          Tongue coating/change in taste   Respiratory:  Negative for cough, shortness of breath and wheezing.    Cardiovascular:  Negative for chest pain.   Gastrointestinal: Negative.    Genitourinary: Negative.  Negative for vaginal discharge.   Musculoskeletal: Negative.    Skin: Negative.    Neurological: Negative.    Psychiatric/Behavioral: Negative.             Current Medications[1]    Allergies:Allergies[2]     Medications - Current[3]    Past Medical History[4]      PHYSICAL EXAM:     Vitals signs taken at home if available:    Limited examination for this telephone visit due to the coronavirus emergency    Patient was speaking in complete sentences, no increased work of breathing and very coherent and alert on the phone.  Alert and oriented x 3  Patient was responding to questions appropriately.  Patient did not appear short of breath.    ASSESSMENT/PLAN:     Encounter Diagnosis   Name Primary?    Thrush        1. Thrush  - nystatin [id number] UNIT/ML Mouth/Throat Suspension; Take 5 mL (500,000 Units total) by mouth 4 (four) times daily.  Dispense: 280 mL; Refill: 0      Patient reminded to practice good  health and safety measures including washing hands, social distancing, covering mouth when coughing/ sneezing, avoid social meetings/ gatherings in face of this Covid 19 pandemic.    Patient verbalized understanding of plan and all questions answered to the best of my ability.    Patient to call back if any change/ worsening of symptoms.    No orders of the defined types were placed in this encounter.      Meds This Visit:  Requested Prescriptions     Signed Prescriptions Disp Refills    nystatin [id number] UNIT/ML Mouth/Throat Suspension 280 mL 0     Sig: Take 5 mL (500,000 Units total) by mouth 4 (four) times daily.       Imaging & Referrals:  None               Shelia Castellanos, APRN  4/17/2025  4:40 PM      Please note that the following visit was completed using two-way, real-time interactive audio and/or video communication.  This has been done in good brinda to provide continuity of care in the best interest of the provider-patient relationship, due to the ongoing public health crisis/national emergency and because of restrictions of visitation.  There are limitations of this visit as no physical exam could be performed.  Every conscious effort was taken to allow for sufficient and adequate time.  This billing was spent on reviewing labs, medications, radiology tests and decision making.  Appropriate medical decision-making and tests are ordered as detailed in the plan of care above  ID#[id number]       [1]   Current Outpatient Medications   Medication Sig Dispense Refill    nystatin [id number] UNIT/ML Mouth/Throat Suspension Take 5 mL (500,000 Units total) by mouth 4 (four) times daily. 280 mL 0    metroNIDAZOLE (FLAGYL) 500 MG Oral Tab Take 1 tablet (500 mg total) by mouth in the morning and 1 tablet (500 mg total) before bedtime. 14 tablet 0    escitalopram (LEXAPRO) 10 MG Oral Tab Take 1 tablet (10 mg total) by mouth daily. 30 tablet 2    fluconazole (DIFLUCAN) 150 MG Oral Tab Take 1 tablet (150 mg total) by mouth As  Directed. Take one tablet by mouth today, repeat one tablet by mouth in 3 days 2 tablet 0    phenazopyridine (PYRIDIUM) 200 MG Oral Tab Take 1 tablet (200 mg total) by mouth 2 (two) times daily as needed for Pain. (Patient not taking: Reported on 4/8/2025) 6 tablet 0    triamcinolone 0.025 % External Cream Apply 1 Application topically 2 (two) times daily. 15 g 0    pantoprazole 40 MG Oral Tab EC Take 1 tablet (40 mg total) by mouth every morning before breakfast. (Patient not taking: Reported on 4/8/2025) 30 tablet 0    fluticasone-salmeterol 250-50 MCG/ACT Inhalation Aerosol Powder, Breath Activated Inhale 1 puff into the lungs Q12H. 180 each 3    ipratropium-albuterol 0.5-2.5 (3) MG/3ML Inhalation Solution Take 3 mL by nebulization every 6 (six) hours as needed. 60 mL 0    montelukast 10 MG Oral Tab Take 1 tablet (10 mg total) by mouth nightly. 90 tablet 3    betamethasone dipropionate 0.05 % External Ointment Apply 1 g topically daily as needed (ear itch). 15 g 0    Rizatriptan Benzoate 10 MG Oral Tab use at onset; may repeat once after 2 hours- ONLY 2 IN 24 HOUR PERIOD MAX.  This is a 30 day supply. 12 tablet 3    fluticasone propionate (FLONASE ALLERGY RELIEF) 50 MCG/ACT Nasal Suspension by Each Nare route daily.      Multiple Vitamin (HIGH POTENCY MULTIVITAMIN) Oral Tab Take 1 tablet by mouth daily. 30 tablet 0    albuterol 108 (90 Base) MCG/ACT Inhalation Aero Soln Inhale 2 puffs into the lungs every 6 (six) hours as needed for Wheezing or Shortness of Breath. 1 each 1    albuterol (2.5 MG/3ML) 0.083% Inhalation Nebu Soln INHALE 1 VIAL PO VIA NEBULIZER Q 4 H PRN 1 each 0    CALCIUM CITRATE-VITAMIN D OR Take by mouth.      OMEGA-3 FATTY ACIDS OR Take by mouth.      ZYRTEC-D ALLERGY & CONGESTION 5-120 MG Oral Tablet 12 Hr TK 1 T PO 1 TO 2 TIMES A DAY     [2]   Allergies  Allergen Reactions    Benzoyl Peroxide FACE FLUSHING, HIVES, RASH and SWELLING    Ciprofloxacin HALLUCINATION   [3]   Current Outpatient  Medications:     nystatin [id number] UNIT/ML Mouth/Throat Suspension, Take 5 mL (500,000 Units total) by mouth 4 (four) times daily., Disp: 280 mL, Rfl: 0    metroNIDAZOLE (FLAGYL) 500 MG Oral Tab, Take 1 tablet (500 mg total) by mouth in the morning and 1 tablet (500 mg total) before bedtime., Disp: 14 tablet, Rfl: 0    escitalopram (LEXAPRO) 10 MG Oral Tab, Take 1 tablet (10 mg total) by mouth daily., Disp: 30 tablet, Rfl: 2    fluconazole (DIFLUCAN) 150 MG Oral Tab, Take 1 tablet (150 mg total) by mouth As Directed. Take one tablet by mouth today, repeat one tablet by mouth in 3 days, Disp: 2 tablet, Rfl: 0    phenazopyridine (PYRIDIUM) 200 MG Oral Tab, Take 1 tablet (200 mg total) by mouth 2 (two) times daily as needed for Pain. (Patient not taking: Reported on 4/8/2025), Disp: 6 tablet, Rfl: 0    triamcinolone 0.025 % External Cream, Apply 1 Application topically 2 (two) times daily., Disp: 15 g, Rfl: 0    pantoprazole 40 MG Oral Tab EC, Take 1 tablet (40 mg total) by mouth every morning before breakfast. (Patient not taking: Reported on 4/8/2025), Disp: 30 tablet, Rfl: 0    fluticasone-salmeterol 250-50 MCG/ACT Inhalation Aerosol Powder, Breath Activated, Inhale 1 puff into the lungs Q12H., Disp: 180 each, Rfl: 3    ipratropium-albuterol 0.5-2.5 (3) MG/3ML Inhalation Solution, Take 3 mL by nebulization every 6 (six) hours as needed., Disp: 60 mL, Rfl: 0    montelukast 10 MG Oral Tab, Take 1 tablet (10 mg total) by mouth nightly., Disp: 90 tablet, Rfl: 3    betamethasone dipropionate 0.05 % External Ointment, Apply 1 g topically daily as needed (ear itch)., Disp: 15 g, Rfl: 0    Rizatriptan Benzoate 10 MG Oral Tab, use at onset; may repeat once after 2 hours- ONLY 2 IN 24 HOUR PERIOD MAX.  This is a 30 day supply., Disp: 12 tablet, Rfl: 3    fluticasone propionate (FLONASE ALLERGY RELIEF) 50 MCG/ACT Nasal Suspension, by Each Nare route daily., Disp: , Rfl:     Multiple Vitamin (HIGH POTENCY MULTIVITAMIN) Oral Tab,  Take 1 tablet by mouth daily., Disp: 30 tablet, Rfl: 0    albuterol 108 (90 Base) MCG/ACT Inhalation Aero Soln, Inhale 2 puffs into the lungs every 6 (six) hours as needed for Wheezing or Shortness of Breath., Disp: 1 each, Rfl: 1    albuterol (2.5 MG/3ML) 0.083% Inhalation Nebu Soln, INHALE 1 VIAL PO VIA NEBULIZER Q 4 H PRN, Disp: 1 each, Rfl: 0    CALCIUM CITRATE-VITAMIN D OR, Take by mouth., Disp: , Rfl:     OMEGA-3 FATTY ACIDS OR, Take by mouth., Disp: , Rfl:     ZYRTEC-D ALLERGY & CONGESTION 5-120 MG Oral Tablet 12 Hr, TK 1 T PO 1 TO 2 TIMES A DAY, Disp: , Rfl:   [4]   Past Medical History:   Allergic rhinitis    Arm vein blood clot    during pregnancy    Asthma (HCC)    Endometriosis    H/O LEEP    HELLP (hemolytic anemia/elev liver enzymes/low platelets in pregnancy) (HCC)    History of DVT (deep vein thrombosis)    Migraines    Visual impairment    glasses

## 2025-04-17 NOTE — TELEPHONE ENCOUNTER
Action Requested: Summary for Provider     []  Critical Lab, Recommendations Needed  [] Need Additional Advice  [x]   FYI    []   Need Orders  [] Need Medications Sent to Pharmacy  []  Other     SUMMARY: Possible oral thrush since using generic Advair [dry powder inhaled corticosteroid] and unable to use spacer with it, rinses mouth and brushes teeth after use. Change in taste and white coating/film present of tongue. Same day virtual visit scheduled. [See below for more details]     Reason for call: Thrush  Onset: 4/16/25    Reason for Disposition   White patches that stick to tongue or inner cheek, which can be wiped off    Protocols used: Mouth Symptoms-A-OH      Patient called states she takes ICS [inhaled corticosteroid] and is prone to thrush. She states she now has thrush of her tongue, coated with white film and changed her taste buds. She also has been on antibiotics in the past couple of weeks. She has been taking probiotics. Denies any fevers. Some irritation of throat present. She had been taking generic Advair Diskus [not able to use spacer with dry powder inhaled corticosteroid], but she stopped taking it related to the thrush - she would like to consider an HFA Rx that is comparable [Symbicort/Dulera, etc with spacer]. She does brush teeth and rinse mouth after each use of ICS. Refer to system/assessment yes/no answers. Visit within 3 days advised, same day virtual visit was scheduled. Home Care Advice discussed, per protocol - she may consider using a tongue scraper and mouthwash as well after each use of ICS. Patient verbalized understanding. No further questions or concerns at this time.    Future Appointments   Date Time Provider Department Center   4/17/2025  4:20 PM Shelia Castellanos APRN [facility]

## 2025-04-21 RX ORDER — NYSTATIN 100000 [USP'U]/ML
5 SUSPENSION ORAL 4 TIMES DAILY
Qty: 200 ML | Refills: 0 | OUTPATIENT
Start: 2025-04-21

## 2025-04-21 NOTE — TELEPHONE ENCOUNTER
Nystatin [id number] unit - 280 ml was sent to [facility] on 04/17/2025.        Outpatient Medication Detail     Disp Refills Start End    nystatin [id number] UNIT/ML Mouth/Throat Suspension 280 mL 0 4/17/2025 --    Sig - Route: Take 5 mL (500,000 Units total) by mouth 4 (four) times daily. - Oral    Sent to pharmacy as: Nystatin [id number] UNIT/ML Mouth/Throat Suspension (Mycostatin)    E-Prescribing Status: Receipt confirmed by pharmacy (4/17/2025  4:47 PM CDT)      Associated Diagnoses    Thrush        Pharmacy    [id number] - [facility] [address] AT [address] [place] [address], [phone], [phone]

## 2025-05-07 ENCOUNTER — TELEPHONE (OUTPATIENT)
Dept: SURGERY | Facility: CLINIC | Age: 45
End: 2025-05-07

## 2025-05-07 DIAGNOSIS — N39.0 RECURRENT UTI: Primary | ICD-10-CM

## 2025-05-08 ENCOUNTER — TELEPHONE (OUTPATIENT)
Dept: SURGERY | Facility: CLINIC | Age: 45
End: 2025-05-08

## 2025-05-08 NOTE — TELEPHONE ENCOUNTER
I s/w pt and gave her the neg urine prelim results and I told her that we will call if anything changes on the final results.

## 2025-05-08 NOTE — TELEPHONE ENCOUNTER
----- Message from Claudia Earl sent at 5/8/2025 11:39 AM CDT -----  Please let her know her urine is negative for infection  ----- Message -----  From: Lab, Background User  Sent: 5/8/2025   9:55 AM CDT  To: Claudia Earl MD

## 2025-05-13 ENCOUNTER — OFFICE VISIT (OUTPATIENT)
Dept: PODIATRY CLINIC | Facility: CLINIC | Age: 45
End: 2025-05-13

## 2025-05-13 VITALS — SYSTOLIC BLOOD PRESSURE: 107 MMHG | DIASTOLIC BLOOD PRESSURE: 69 MMHG | HEART RATE: 70 BPM

## 2025-05-13 DIAGNOSIS — L60.3 NAIL DYSTROPHY: Primary | ICD-10-CM

## 2025-05-13 PROCEDURE — 99214 OFFICE O/P EST MOD 30 MIN: CPT | Performed by: STUDENT IN AN ORGANIZED HEALTH CARE EDUCATION/TRAINING PROGRAM

## 2025-05-13 NOTE — PROGRESS NOTES
Per verbal order from Dr Hurst, draw up 6 mls of 1% lidocaine for ingrown toenail procedure. AICHA Mcqueen RN.

## 2025-05-13 NOTE — PROGRESS NOTES
[facility]  Progress Note      Isis Gramajo is a 44 year old female.   Chief Complaint   Patient presents with    Nail, Ingrown     Left hallux- Denies pain. Here for procedure.              HPI:     Patient is a pleasant 44-year-old female who presents to clinic for evaluation of left hallux dystrophic toenail.  She admits to being on oral Lamisil in the past and also having a partial nail avulsion in the past.  Patient admits that the toenail is thick, discolored and often painful.    Allergies: Benzoyl peroxide and Ciprofloxacin    Current Medications[1]   Past Medical History[2]   Past Surgical History[3]   Family History[4]   Social Hx on file[5]        REVIEW OF SYSTEMS:     Denies nause, fever, chills  No calf pain  Denies chest pain or SOB      EXAM:   /69   Pulse 70   GENERAL: well developed, well nourished, in no apparent distress  EXTREMITIES:   1. Integument: Normal skin temperature and turgor.  Dystrophic left hallux toenail  2. Vascular: Dorsalis pedis two out of four bilateral and posterior tibial pulses two out of   four bilateral, capillary refill normal.   3. Musculoskeletal: All muscle groups are graded 5 out of 5 in the foot and ankle.   4. Neurological: Normal sharp dull sensation; reflexes normal.             ASSESSMENT AND PLAN:   Diagnoses and all orders for this visit:    Nail dystrophy        Plan:       -Patient examined, chart history reviewed.  -Discussed etiology of patient's condition and various treatment options.  -Recommend  nail avulsion with matricectomy to prior left hallux nail .  Discussed procedure in detail with patient  including benefits, risks, and recovery period. Benefits including decrease in pain and risks including but not limited to recurrence, infection, worsening of condition and loss of toe.   Patient agreeable with procedure and written consent was obtained.    Procedure as follows:  After prepping site with alcohol, administered local  infiltrative block to left hallux consisting of 6 cc of  1% lidocaine plain.  After sufficient anesthesia was achieved, the digit was prepped with Betadine and a tourniquet was used for hemostasis.  Next, using a hemostat, the left hallux nail folds were freed.    A hemostat was once again used to remove the left hallux nail border   in its entirety.  Bacitracin was applied to the skin edges of the toe for protection.  Next  3 applications of disposable phenol sticks were administered to the avulsed toenail site for 20 seconds each.    The site was then copiously irrigated with alcohol and patted dry.  The site was dressed with bacitracin, gauze, and mildly compressive Coban wrap.  The tourniquet was released at this time with CFT brisk to the digits noted.  The patient tolerated the procedure well and there were no complications.      -Leave bandage intact for 24 hrs. Starting in 24 hrs  soak foot once daily for 15-20 minutes in lukewarm water and non-scented Epsom salt. Dry toe well and dress with bacitracin/neosporin after soaks once daily . Soaks and dressings are to be performed for 10 days. After 10 days let  toe to air out and only cover with bandaid during the day.   -OTC NSAIDs for pain if needed  -Educated patient on acute signs of infection advised patient to seek immediate medical attention if any concerns arise    RTC in 3 weeks      The patient indicates understanding of these issues and agrees to the plan.        Nae Hurst DPM        [1]   Current Outpatient Medications   Medication Sig Dispense Refill    nystatin 124704 UNIT/ML Mouth/Throat Suspension Take 5 mL (500,000 Units total) by mouth 4 (four) times daily. 280 mL 0    escitalopram (LEXAPRO) 10 MG Oral Tab Take 1 tablet (10 mg total) by mouth daily. 30 tablet 2    fluconazole (DIFLUCAN) 150 MG Oral Tab Take 1 tablet (150 mg total) by mouth As Directed. Take one tablet by mouth today, repeat one tablet by mouth in 3 days 2 tablet 0     phenazopyridine (PYRIDIUM) 200 MG Oral Tab Take 1 tablet (200 mg total) by mouth 2 (two) times daily as needed for Pain. 6 tablet 0    triamcinolone 0.025 % External Cream Apply 1 Application topically 2 (two) times daily. 15 g 0    pantoprazole 40 MG Oral Tab EC Take 1 tablet (40 mg total) by mouth every morning before breakfast. 30 tablet 0    fluticasone-salmeterol 250-50 MCG/ACT Inhalation Aerosol Powder, Breath Activated Inhale 1 puff into the lungs Q12H. 180 each 3    ipratropium-albuterol 0.5-2.5 (3) MG/3ML Inhalation Solution Take 3 mL by nebulization every 6 (six) hours as needed. 60 mL 0    montelukast 10 MG Oral Tab Take 1 tablet (10 mg total) by mouth nightly. 90 tablet 3    betamethasone dipropionate 0.05 % External Ointment Apply 1 g topically daily as needed (ear itch). 15 g 0    Rizatriptan Benzoate 10 MG Oral Tab use at onset; may repeat once after 2 hours- ONLY 2 IN 24 HOUR PERIOD MAX.  This is a 30 day supply. 12 tablet 3    fluticasone propionate (FLONASE ALLERGY RELIEF) 50 MCG/ACT Nasal Suspension by Each Nare route daily.      Multiple Vitamin (HIGH POTENCY MULTIVITAMIN) Oral Tab Take 1 tablet by mouth daily. 30 tablet 0    albuterol 108 (90 Base) MCG/ACT Inhalation Aero Soln Inhale 2 puffs into the lungs every 6 (six) hours as needed for Wheezing or Shortness of Breath. 1 each 1    albuterol (2.5 MG/3ML) 0.083% Inhalation Nebu Soln INHALE 1 VIAL PO VIA NEBULIZER Q 4 H PRN 1 each 0    CALCIUM CITRATE-VITAMIN D OR Take by mouth.      OMEGA-3 FATTY ACIDS OR Take by mouth.      ZYRTEC-D ALLERGY & CONGESTION 5-120 MG Oral Tablet 12 Hr TK 1 T PO 1 TO 2 TIMES A DAY      metroNIDAZOLE (FLAGYL) 500 MG Oral Tab Take 1 tablet (500 mg total) by mouth in the morning and 1 tablet (500 mg total) before bedtime. (Patient not taking: Reported on 5/13/2025) 14 tablet 0   [2]   Past Medical History:   Allergic rhinitis    Arm vein blood clot    during pregnancy    Asthma (HCC)    Endometriosis    H/O LEEP     HELLP (hemolytic anemia/elev liver enzymes/low platelets in pregnancy) (HCC)    History of DVT (deep vein thrombosis)    Migraines    Visual impairment    glasses   [3]   Past Surgical History:  Procedure Laterality Date    Appendectomy             delivery only  2014    Cholecystectomy  2016    Hysterectomy  2015    Laparoscopy,diagnostic  , ,,2005    ablation of implants   [4]   Family History  Problem Relation Age of Onset    Heart Disorder Father     Hypertension Father     Lipids Father     Cancer Paternal Grandmother     Breast Cancer Other         paternal cousin    Ovarian Cancer Other         paternal cousin    Other (testicular cancer) Other         paternal cousin    Breast Cancer Maternal Cousin Female 40    Ovarian Cancer Maternal Cousin Female 43   [5]   Social History  Socioeconomic History    Marital status:    Tobacco Use    Smoking status: Never    Smokeless tobacco: Never   Vaping Use    Vaping status: Never Used   Substance and Sexual Activity    Alcohol use: Not Currently     Comment: rarely    Drug use: Never    Sexual activity: Not Currently     Partners: Male     Birth control/protection: Hysterectomy   Other Topics Concern    Caffeine Concern Yes     Comment: coffee    Exercise Yes    Breast feeding No    Reaction to local anesthetic No    Pt has a pacemaker No    Pt has a defibrillator No

## 2025-06-11 ENCOUNTER — TELEPHONE (OUTPATIENT)
Dept: PODIATRY CLINIC | Facility: CLINIC | Age: 45
End: 2025-06-11

## 2025-06-11 DIAGNOSIS — M79.672 LEFT FOOT PAIN: Primary | ICD-10-CM

## 2025-06-11 NOTE — TELEPHONE ENCOUNTER
Patient does have some bursitis symptoms to her left lateral foot and also some ankle pain.  Plan for custom orthotics once authorization complete.    Will also check inflammatory markers given her pain in multiple joints.

## 2025-06-18 ENCOUNTER — LAB ENCOUNTER (OUTPATIENT)
Dept: LAB | Facility: HOSPITAL | Age: 45
End: 2025-06-18
Attending: STUDENT IN AN ORGANIZED HEALTH CARE EDUCATION/TRAINING PROGRAM
Payer: COMMERCIAL

## 2025-06-18 DIAGNOSIS — M79.672 LEFT FOOT PAIN: ICD-10-CM

## 2025-06-18 LAB
CRP SERPL HS-MCNC: 5.7 MG/L (ref ?–3)
ERYTHROCYTE [SEDIMENTATION RATE] IN BLOOD: 19 MM/HR (ref 0–20)
RHEUMATOID FACT SERPL-ACNC: 4.5 IU/ML (ref ?–14)

## 2025-06-18 PROCEDURE — 86431 RHEUMATOID FACTOR QUANT: CPT | Performed by: STUDENT IN AN ORGANIZED HEALTH CARE EDUCATION/TRAINING PROGRAM

## 2025-06-18 PROCEDURE — 86200 CCP ANTIBODY: CPT | Performed by: STUDENT IN AN ORGANIZED HEALTH CARE EDUCATION/TRAINING PROGRAM

## 2025-06-18 PROCEDURE — 36415 COLL VENOUS BLD VENIPUNCTURE: CPT | Performed by: STUDENT IN AN ORGANIZED HEALTH CARE EDUCATION/TRAINING PROGRAM

## 2025-06-18 PROCEDURE — 85652 RBC SED RATE AUTOMATED: CPT

## 2025-06-18 PROCEDURE — 86141 C-REACTIVE PROTEIN HS: CPT | Performed by: STUDENT IN AN ORGANIZED HEALTH CARE EDUCATION/TRAINING PROGRAM

## 2025-06-20 DIAGNOSIS — M79.672 LEFT FOOT PAIN: Primary | ICD-10-CM

## 2025-06-20 DIAGNOSIS — M77.52 BURSITIS OF LEFT FOOT: ICD-10-CM

## 2025-06-20 LAB — CCP IGG SERPL-ACNC: 0.9 U/ML (ref 0–6.9)

## 2025-06-20 RX ORDER — METHYLPREDNISOLONE 4 MG/1
TABLET ORAL
Qty: 21 TABLET | Refills: 0 | Status: SHIPPED | OUTPATIENT
Start: 2025-06-20

## 2025-06-20 RX ORDER — MELOXICAM 15 MG/1
15 TABLET ORAL DAILY
Qty: 30 TABLET | Refills: 1 | Status: SHIPPED | OUTPATIENT
Start: 2025-06-20 | End: 2025-08-19

## 2025-07-16 ENCOUNTER — TELEPHONE (OUTPATIENT)
Dept: ORTHOPEDICS CLINIC | Facility: CLINIC | Age: 45
End: 2025-07-16

## 2025-07-16 DIAGNOSIS — M25.559 HIP PAIN, UNSPECIFIED LATERALITY: Primary | ICD-10-CM

## 2025-07-24 ENCOUNTER — OFFICE VISIT (OUTPATIENT)
Dept: PULMONOLOGY | Facility: CLINIC | Age: 45
End: 2025-07-24

## 2025-07-24 VITALS
SYSTOLIC BLOOD PRESSURE: 106 MMHG | WEIGHT: 222.81 LBS | OXYGEN SATURATION: 98 % | BODY MASS INDEX: 33.77 KG/M2 | DIASTOLIC BLOOD PRESSURE: 72 MMHG | HEART RATE: 88 BPM | HEIGHT: 68 IN

## 2025-07-24 DIAGNOSIS — J45.30 MILD PERSISTENT ASTHMA WITHOUT COMPLICATION (HCC): Primary | ICD-10-CM

## 2025-07-24 DIAGNOSIS — J45.20 MILD INTERMITTENT ASTHMA WITHOUT COMPLICATION (HCC): ICD-10-CM

## 2025-07-24 DIAGNOSIS — R06.02 SOB (SHORTNESS OF BREATH): ICD-10-CM

## 2025-07-24 PROCEDURE — 99213 OFFICE O/P EST LOW 20 MIN: CPT | Performed by: INTERNAL MEDICINE

## 2025-07-24 RX ORDER — ALBUTEROL SULFATE 90 UG/1
2 INHALANT RESPIRATORY (INHALATION) EVERY 6 HOURS PRN
Qty: 3 EACH | Refills: 3 | Status: SHIPPED | OUTPATIENT
Start: 2025-07-24

## 2025-07-24 RX ORDER — ALBUTEROL SULFATE 0.83 MG/ML
SOLUTION RESPIRATORY (INHALATION)
Qty: 60 EACH | Refills: 5 | Status: SHIPPED | OUTPATIENT
Start: 2025-07-24

## 2025-07-24 RX ORDER — MONTELUKAST SODIUM 10 MG/1
10 TABLET ORAL NIGHTLY
Qty: 90 TABLET | Refills: 3 | Status: SHIPPED | OUTPATIENT
Start: 2025-07-24

## 2025-07-24 NOTE — PROGRESS NOTES
The patient is a 44-year-old female who I know well from prior evaluation and comes in now for follow-up.  She had mild persistent asthma and is doing well clinically.  She developed thrush on the Advair and is doing well off this medication.    Review of Systems:  Vision normal. Ear nose and throat normal. Bowel normal. Bladder function normal. No depression. No thyroid disease. No lymphatic system concerns.  No rash. Muscles and joints unremarkable. No weight loss no weight gain.    Physical Examination:  Vital signs normal. HEENT examination is unremarkable with pupils equal round and reactive to light and accommodation. Neck without adenopathy, thyromegaly, JVD nor bruit. Lungs clear to auscultation and percussion. Cardiac regular rate and rhythm no murmur. Abdomen nontender, without hepatosplenomegaly and no mass appreciable. Extremities and Musculoskeletal without clubbing cyanosis nor edema, and mobility acceptable. Neurologic grossly intact with symmetric tone and strength and reflex.    Assessment and plan:  1.  Mild persistent asthma-doing well clinically with albuterol and Singulair with home nebulizer which she uses infrequently.  Annual flu shot.  COVID booster, see me again in 1 year, contact me promptly if new trouble.

## 2025-07-29 ENCOUNTER — OFFICE VISIT (OUTPATIENT)
Dept: INTERNAL MEDICINE CLINIC | Facility: CLINIC | Age: 45
End: 2025-07-29

## 2025-07-29 VITALS
WEIGHT: 222.63 LBS | HEIGHT: 68 IN | SYSTOLIC BLOOD PRESSURE: 110 MMHG | BODY MASS INDEX: 33.74 KG/M2 | TEMPERATURE: 98 F | HEART RATE: 94 BPM | OXYGEN SATURATION: 97 % | DIASTOLIC BLOOD PRESSURE: 68 MMHG

## 2025-07-29 DIAGNOSIS — M79.89 LEG SWELLING: Primary | ICD-10-CM

## 2025-07-29 DIAGNOSIS — N64.4 BREAST PAIN: ICD-10-CM

## 2025-07-29 PROCEDURE — 99214 OFFICE O/P EST MOD 30 MIN: CPT | Performed by: NURSE PRACTITIONER

## 2025-08-12 ENCOUNTER — HOSPITAL ENCOUNTER (OUTPATIENT)
Dept: CV DIAGNOSTICS | Facility: HOSPITAL | Age: 45
Discharge: HOME | End: 2025-08-12
Attending: NURSE PRACTITIONER

## 2025-08-12 DIAGNOSIS — M79.89 LEG SWELLING: ICD-10-CM

## 2025-08-12 PROCEDURE — 93306 TTE W/DOPPLER COMPLETE: CPT | Performed by: NURSE PRACTITIONER

## 2025-08-15 ENCOUNTER — PATIENT MESSAGE (OUTPATIENT)
Dept: INTERNAL MEDICINE CLINIC | Facility: CLINIC | Age: 45
End: 2025-08-15

## 2025-08-26 ENCOUNTER — TELEPHONE (OUTPATIENT)
Dept: PODIATRY CLINIC | Facility: CLINIC | Age: 45
End: 2025-08-26

## 2025-08-26 RX ORDER — METHYLPREDNISOLONE 4 MG/1
TABLET ORAL
Qty: 21 TABLET | Refills: 0 | Status: SHIPPED | OUTPATIENT
Start: 2025-08-26

## 2025-08-27 ENCOUNTER — HOSPITAL ENCOUNTER (OUTPATIENT)
Dept: MAMMOGRAPHY | Facility: HOSPITAL | Age: 45
Discharge: HOME | End: 2025-08-27
Attending: NURSE PRACTITIONER

## 2025-08-27 ENCOUNTER — HOSPITAL ENCOUNTER (OUTPATIENT)
Dept: ULTRASOUND IMAGING | Facility: HOSPITAL | Age: 45
Discharge: HOME | End: 2025-08-27
Attending: NURSE PRACTITIONER

## 2025-08-27 DIAGNOSIS — N64.4 BREAST PAIN: ICD-10-CM

## 2025-08-27 DIAGNOSIS — M79.89 LEG SWELLING: ICD-10-CM

## 2025-08-27 PROCEDURE — 77066 DX MAMMO INCL CAD BI: CPT | Performed by: NURSE PRACTITIONER

## 2025-08-27 PROCEDURE — 93970 EXTREMITY STUDY: CPT | Performed by: NURSE PRACTITIONER

## 2025-08-27 PROCEDURE — 77062 BREAST TOMOSYNTHESIS BI: CPT | Performed by: NURSE PRACTITIONER

## (undated) DIAGNOSIS — N90.89 VULVAR IRRITATION: Primary | ICD-10-CM

## (undated) DEVICE — OCCLUSIVE GAUZE STRIP OVERWRAP,3% BISMUTH TRIBROMOPHENATE IN PETROLATUM BLEND: Brand: XEROFORM

## (undated) DEVICE — SOLUTION IRRIG 1000ML 0.9% NACL USP BTL

## (undated) DEVICE — 1010 S-DRAPE TOWEL DRAPE 10/BX: Brand: STERI-DRAPE™

## (undated) DEVICE — 12 ML SYRINGE LUER-LOCK TIP: Brand: MONOJECT

## (undated) DEVICE — Device

## (undated) DEVICE — SUTURE VCRL SZ 2-0 L27IN ABSRB UD L26MM SH

## (undated) DEVICE — WEBRIL COTTON UNDERCAST PADDING: Brand: WEBRIL

## (undated) DEVICE — BANDAGE,GAUZE,BULKEE II,4.5"X4.1YD,STRL: Brand: MEDLINE

## (undated) DEVICE — GAMMEX® PI HYBRID SIZE 7.5, STERILE POWDER-FREE SURGICAL GLOVE, POLYISOPRENE AND NEOPRENE BLEND: Brand: GAMMEX

## (undated) DEVICE — SKIN PREP TRAY 4 COMPARTM TRAY: Brand: MEDLINE INDUSTRIES, INC.

## (undated) DEVICE — SUTURE ETHLN SZ 3-0 L30IN NONABSORB BLK

## (undated) DEVICE — DISPOSABLE TOURNIQUET CUFF SINGLE BLADDER, DUAL PORT AND QUICK CONNECT CONNECTOR: Brand: COLOR CUFF

## (undated) DEVICE — LOWER EXTREMITY: Brand: MEDLINE INDUSTRIES, INC.

## (undated) DEVICE — SUTURE ETHLN SZ 4-0 L18IN NABSRB BLK L19MM

## (undated) NOTE — LETTER
AUTHORIZATION FOR SURGICAL OPERATION OR OTHER PROCEDURE    1. I hereby authorize Dr. Hurst, and [facility] staff assigned to my case to perform the following operation and/or procedure at the [facility] site:    ___________________Left Hallux ingrown nail procedure_________________________      _______________________________________________________________________________________________    2.  My physician has explained the nature and purpose of the operation or other procedure, possible alternative methods of treatment, the risks involved, and the possibility of complication to me.  I acknowledge that no guarantee has been made as to the result that may be obtained.  3.  I recognize that, during the course of this operation, or other procedure, unforseen conditions may necessitate additional or different procedure than those listed above.  I, therefore, further authorize and request that the above named physician, his/her physician assistants or designees perform such procedures as are, in his/her professional opinion, necessary and desirable.  4.  Any tissue or organs removed in the operation or other procedure may be disposed of by and at the discretion of the [facility] and [facility].  5.  I understand that in the event of a medical emergency, I will be transported by local paramedics to [facility] or other hospital emergency department.  6.  I certify that I have read and fully understand the above consent to operation and/or other procedure.    7.  I acknowledge that my physician has explained sedation/analgesia administration to me including the risks and benefits.  I consent to the administration of sedation/analgesia as may be necessary or desirable in the judgement of my physician.    Witness signature: ___________________________________________________ Date:  ______/______/_____                    Time:  ________ YOUSIF FLORES        Patient Name:  ______________________________________________________  (please print)      Patient signature:  ___________________________________________________             Relationship to Patient:           []  Parent    Responsible person                          []  Spouse  In case of minor or                    [] Other  _____________   Incompetent name:  __________________________________________________                               (please print)      _____________      Responsible person  In case of minor or  Incompetent signature:  _______________________________________________    Statement of Physician  My signature below affirms that prior to the time of the procedure, I have explained to the patient and/or his/her guardian, the risks and benefits involved in the proposed treatment and any reasonable alternative to the proposed treatment.  I have also explained the risks and benefits involved in the refusal of the proposed treatment and have answered the patient's questions.                        Date:  ______/______/_______  Provider                      Signature:  __________________________________________________________       Time:  ___________ A.M    P.M.

## (undated) NOTE — LETTER
AUTHORIZATION FOR SURGICAL OPERATION OR OTHER PROCEDURE    1. I hereby authorize Dr. Denzel Bains, and [facility] staff assigned to my case to perform the following operation and/or procedure at the [facility]:    _______________________________________________________________________________________________    Cortisone Injection Left Foot  _______________________________________________________________________________________________    2. My physician has explained the nature and purpose of the operation or other procedure, possible alternative methods of treatment, the risks involved, and the possibility of complication to me. I acknowledge that no guarantee has been made as to the result that may be obtained. 3.  I recognize that, during the course of this operation, or other procedure, unforseen conditions may necessitate additional or different procedure than those listed above. I, therefore, further authorize and request that the above named physician, his/her physician assistants or designees perform such procedures as are, in his/her professional opinion, necessary and desirable. 4.  Any tissue or organs removed in the operation or other procedure may be disposed of by and at the discretion of the [facility] and [facility]. 5.  I understand that in the event of a medical emergency, I will be transported by local paramedics to [facility] or other hospital emergency department. 6.  I certify that I have read and fully understand the above consent to operation and/or other procedure. 7.  I acknowledge that my physician has explained sedation/analgesia administration to me including the risks and benefits. I consent to the administration of sedation/analgesia as may be necessary or desirable in the judgement of my physician.     Witness signature: ___________________________________________________ Date:  ______/______/_____                    Time: ________ A. M.  P.M. Patient Name:  ______________________________________________________  (please print)      Patient signature:  ___________________________________________________             Relationship to Patient:           []  Parent    Responsible person                          []  Spouse  In case of minor or                    [] Other  _____________   Incompetent name:  __________________________________________________                               (please print)      _____________      Responsible person  In case of minor or  Incompetent signature:  _______________________________________________    Statement of Physician  My signature below affirms that prior to the time of the procedure, I have explained to the patient and/or his/her guardian, the risks and benefits involved in the proposed treatment and any reasonable alternative to the proposed treatment. I have also explained the risks and benefits involved in the refusal of the proposed treatment and have answered the patient's questions.                         Date:  ______/______/_______  Provider                      Signature:  __________________________________________________________       Time:  ___________ A.M    P.M.

## (undated) NOTE — LETTER
Date & Time: 9/19/2023, 5:24 PM  Patient: Vijay Gramajo  Encounter Provider(s):    Elvia New       To Whom It May Concern:    Umair Lexii was seen and treated in our department on 09/19/2023. Please excuse from work until 09/23/2023. May return if has no fever and symptom improvement. COVID PCR is negative. Diagnosis: Acute bronchitis;  Laryngitis      If you have any questions or concerns, please do not hesitate to call.        _____________________________  Physician/APC Signature

## (undated) NOTE — LETTER
Date & Time: 12/9/2022, 10:59 AM  Patient: Jo-Ann Maxwell      To Whom It May Concern:    Jo-Ann Maxwell was seen and treated in our department. She should not return to work until 12/10/2022.     If you have any questions or concerns, please do not hesitate to call.        _____Jose Bell MD_________________  Physician/APC Signature

## (undated) NOTE — LETTER
AUTHORIZATION FOR SURGICAL OPERATION OR OTHER PROCEDURE    1. I hereby authorize Dr. Nae Hurst, and [facility] staff assigned to my case to perform the following operation and/or procedure at the [facility] site:    _______________________________________________________________________________________________    Cortisone Injection Left Foot   _______________________________________________________________________________________________    2.  My physician has explained the nature and purpose of the operation or other procedure, possible alternative methods of treatment, the risks involved, and the possibility of complication to me.  I acknowledge that no guarantee has been made as to the result that may be obtained.  3.  I recognize that, during the course of this operation, or other procedure, unforseen conditions may necessitate additional or different procedure than those listed above.  I, therefore, further authorize and request that the above named physician, his/her physician assistants or designees perform such procedures as are, in his/her professional opinion, necessary and desirable.  4.  Any tissue or organs removed in the operation or other procedure may be disposed of by and at the discretion of the [facility] and [facility].  5.  I understand that in the event of a medical emergency, I will be transported by local paramedics to [facility] or other hospital emergency department.  6.  I certify that I have read and fully understand the above consent to operation and/or other procedure.    7.  I acknowledge that my physician has explained sedation/analgesia administration to me including the risks and benefits.  I consent to the administration of sedation/analgesia as may be necessary or desirable in the judgement of my physician.    Witness signature: ___________________________________________________ Date:   ______/______/_____                    Time:  ________ A.M.  P.M.       Patient Name:  ______________________________________________________  (please print)      Patient signature:  ___________________________________________________             Relationship to Patient:           []  Parent    Responsible person                          []  Spouse  In case of minor or                    [] Other  _____________   Incompetent name:  __________________________________________________                               (please print)      _____________      Responsible person  In case of minor or  Incompetent signature:  _______________________________________________    Statement of Physician  My signature below affirms that prior to the time of the procedure, I have explained to the patient and/or his/her guardian, the risks and benefits involved in the proposed treatment and any reasonable alternative to the proposed treatment.  I have also explained the risks and benefits involved in the refusal of the proposed treatment and have answered the patient's questions.                        Date:  ______/______/_______  Provider                      Signature:  __________________________________________________________       Time:  ___________ A.M    P.M.

## (undated) NOTE — LETTER
AUTHORIZATION FOR SURGICAL OPERATION OR OTHER PROCEDURE    1. I hereby authorize Dr. Fabian Miller and the [facility] staff assigned to my case to perform the following operation and/or procedure at the [facility]:    Occipital Nerve Block     _______________________________________________________________________________________________    2. My physician has explained the nature and purpose of the operation or other procedure, possible alternative methods of treatment, the risks involved, and the possibility of complication to me. I acknowledge that no guarantee has been made as to the result that may be obtained. 3.  I recognize that, during the course of this operation, or other procedure, unforseen conditions may necessitate additional or different procedure than those listed above. I, therefore, further authorize and request that the above named physician, his/her physician assistants or designees perform such procedures as are, in his/her professional opinion, necessary and desirable. 4.  Any tissue or organs removed in the operation or other procedure may be disposed of by and at the discretion of the [facility] staff and [facility]. 5.  I understand that in the event of a medical emergency, I will be transported by local paramedics to [facility] or other hospital emergency department. 6.  I certify that I have read and fully understand the above consent to operation and/or other procedure. 7.  I acknowledge that my physician has explained sedation/analgesia administration to me including the risks and benefits. I consent to the administration of sedation/analgesia as may be necessary or desirable in the judgement of my physician. Witness signature: ___________________________________________________ Date:  2/15/22                    Time:  ________ A. M.  P.M.        Patient Name: Erick Harris  (please print)       Patient signature: ___________________________________________________             Relationship to Patient:           []  Parent    Responsible person                          []  Spouse  In case of minor or                    [] Other  _____________   Incompetent name:  __________________________________________________                               (please print)      _____________      Responsible person  In case of minor or  Incompetent signature:  _______________________________________________    Statement of Physician  My signature below affirms that prior to the time of the procedure, I have explained to the patient and/or his/her guardian, the risks and benefits involved in the proposed treatment and any reasonable alternative to the proposed treatment. I have also explained the risks and benefits involved in the refusal of the proposed treatment and have answered the patient's questions.                         Date:  2/15/22  Provider                      Signature:  __________________________________________________________       Time:  ___________ A.M    P.M.

## (undated) NOTE — LETTER
11/1/2021      To Whom It May Concern:    Ramonita Hanson is currently under my medical care and has been cleared to return to work by Tuesday 11/2/21 without restrictions. If you require additional information please contact our office.

## (undated) NOTE — MR AVS SNAPSHOT
After Visit Summary   3/29/2021    Oscar Mariee    MRN: [id number]           Visit Information     Date & Time  3/29/2021  4:00 PM Provider  [address] 68[address]  Phone  [phone] Inhale 2 puffs into the lungs daily. Montelukast Sodium 10 MG Oral Tab Take 1 tablet (10 mg total) by mouth nightly.       Diagnoses for This Visit    Encounter for annual routine gynecological examination   [[id number]]  -  Primary  Encounter for screening available in [employer] to schedule on your own at a time most convenient to you. If you do not have a [employer] Account, or if you prefer to speak with someone to schedule your appointment, please call [email or web address] [facility] at [phone]. non-life-threatening.   Also available by appointment     Average cost  $70*       [address]   Monday – Friday  10:00 am – 10:00 pm   Saturday – Sunday  10:00 am – 4:00 pm

## (undated) NOTE — LETTER
9/23/2021          To Whom It May Concern:    Sakshi Carrero is currently under my medical care and may not return to work at this time. Please excuse her as the pending work-up is being done and until she feels better.     If you require additional

## (undated) NOTE — LETTER
10/19/2021          To Whom It May Concern:    Krysta Aleman is currently under my medical care and may not return to work at this time. She is continuing to have close follow-up and assessments. Please excuse her at least until 11/2/2021.     If

## (undated) NOTE — LETTER
Date & Time: 4/15/2024, 7:14 PM  Patient: Isis Gramajo  Encounter Provider(s):    Chris Norton APRN       To Whom It May Concern:    Isis Gramajo was seen and treated in our department on 4/15/2024. She should not return to work until 04/19/2024 .    If you have any questions or concerns, please do not hesitate to call.        _____________________________  Physician/APC Signature

## (undated) NOTE — LETTER
Date & Time: 3/30/2023, 6:23 PM  Patient: Jo-Ann Mtz  Encounter Provider(s):    FILIPE Bob       To Whom It May Concern:    Jo-Ann Mtz was seen and treated in our department on 3/30/2023.  She can return to work on April 3rd, 2023  If you have any questions or concerns, please do not hesitate to call.        _____________________________  Physician/APC Signature

## (undated) NOTE — LETTER
AUTHORIZATION FOR SURGICAL OPERATION OR OTHER PROCEDURE    1. I hereby authorize Dr. Chamberlain, and [facility] staff assigned to my case to perform the following operation and/or procedure at the [facility]:    _____________________________Cortisone injection left foot_________________________      _______________________________________________________________________________________________    2.  My physician has explained the nature and purpose of the operation or other procedure, possible alternative methods of treatment, the risks involved, and the possibility of complication to me.  I acknowledge that no guarantee has been made as to the result that may be obtained.  3.  I recognize that, during the course of this operation, or other procedure, unforseen conditions may necessitate additional or different procedure than those listed above.  I, therefore, further authorize and request that the above named physician, his/her physician assistants or designees perform such procedures as are, in his/her professional opinion, necessary and desirable.  4.  Any tissue or organs removed in the operation or other procedure may be disposed of by and at the discretion of the [facility] and [facility].  5.  I understand that in the event of a medical emergency, I will be transported by local paramedics to [facility] or other hospital emergency department.  6.  I certify that I have read and fully understand the above consent to operation and/or other procedure.    7.  I acknowledge that my physician has explained sedation/analgesia administration to me including the risks and benefits.  I consent to the administration of sedation/analgesia as may be necessary or desirable in the judgement of my physician.    Witness signature: ___________________________________________________ Date:  ______/______/_____                    Time:  ________ A.M.  P.M.       Patient Name:   ______________________________________________________  (please print)      Patient signature:  ___________________________________________________             Relationship to Patient:           []  Parent    Responsible person                          []  Spouse  In case of minor or                    [] Other  _____________   Incompetent name:  __________________________________________________                               (please print)      _____________      Responsible person  In case of minor or  Incompetent signature:  _______________________________________________    Statement of Physician  My signature below affirms that prior to the time of the procedure, I have explained to the patient and/or his/her guardian, the risks and benefits involved in the proposed treatment and any reasonable alternative to the proposed treatment.  I have also explained the risks and benefits involved in the refusal of the proposed treatment and have answered the patient's questions.                        Date:  ______/______/_______  Provider                      Signature:  __________________________________________________________       Time:  ___________ A.M    P.M.

## (undated) NOTE — LETTER
12/6/2022          To Whom It May Concern:    Valdemar Estrada is currently under my medical care and may not return to work at this time. Please excuse Lita Harris for 2 days. She may return to work on 12/8/2022. Activity is restricted as follows: none. If you require additional information please contact our office.         Sincerely,    Cailin Del Rosario MD          Document generated by:  Cailin Del Rosario MD

## (undated) NOTE — LETTER
Date & Time: 12/9/2022, 10:57 AM  Patient: Alyssa Valenzuela      To Whom It May Concern:    Alyssa Valenzuela was seen and treated in our department. She should not return to work until Friday 12/9/2022.     If you have any questions or concerns, please do not hesitate to call.        _____________________________  Physician/APC Signature

## (undated) NOTE — LETTER
1/2/2025          To Whom It May Concern:    Isis Gramajo is currently under my medical care and may not return to work at this time.    Please excuse Isis for 2 days.  She may return to work on 1/6/2025.  Activity is restricted as follows: none.    If you require additional information please contact our office.        Sincerely,         FILIPE Mott          Document generated by:  FILIPE Mott

## (undated) NOTE — LETTER
AUTHORIZATION FOR SURGICAL OPERATION OR OTHER PROCEDURE    1. I hereby authorize Dr. Barbara Malik, and [email or web address] assigned to my case to perform the following operation and/or procedure at the [facility]:    _______________________________________________________________________________________________    Cortisone injection, left foot  _______________________________________________________________________________________________    2. My physician has explained the nature and purpose of the operation or other procedure, possible alternative methods of treatment, the risks involved, and the possibility of complication to me. I acknowledge that no guarantee has been made as to the result that may be obtained. 3.  I recognize that, during the course of this operation, or other procedure, unforseen conditions may necessitate additional or different procedure than those listed above. I, therefore, further authorize and request that the above named physician, his/her physician assistants or designees perform such procedures as are, in his/her professional opinion, necessary and desirable. 4.  Any tissue or organs removed in the operation or other procedure may be disposed of by and at the discretion of the [facility], [facility] and [facility]. 5.  I understand that in the event of a medical emergency, I will be transported by local paramedics to [facility] or other hospital emergency department. 6.  I certify that I have read and fully understand the above consent to operation and/or other procedure. 7.  I acknowledge that my physician has explained sedation/analgesia administration to me including the risks and benefits. I consent to the administration of sedation/analgesia as may be necessary or desirable in the judgement of my physician.     Witness signature: ___________________________________________________ Date:  ______/______/_____                    Time: ________ A. M.  P.M. Patient Name:  ______________________________________________________  (please print)      Patient signature:  ___________________________________________________             Relationship to Patient:           []  Parent    Responsible person                          []  Spouse  In case of minor or                    [] Other  _____________   Incompetent name:  __________________________________________________                               (please print)      _____________      Responsible person  In case of minor or  Incompetent signature:  _______________________________________________    Statement of Physician  My signature below affirms that prior to the time of the procedure, I have explained to the patient and/or his/her guardian, the risks and benefits involved in the proposed treatment and any reasonable alternative to the proposed treatment. I have also explained the risks and benefits involved in the refusal of the proposed treatment and have answered the patient's questions.                         Date:  ______/______/_______  Provider                      Signature:  __________________________________________________________       Time:  ___________ A.M    P.M.

## (undated) NOTE — LETTER
4/17/2024          To Whom It May Concern:    Isis Gramajo is currently under my medical care and may not return to work at this time.    Please excuse Isis for 4 days.  She may return to work on 4/22/2024.  Activity is restricted as follows: none.    If you require additional information please contact our office.        Sincerely,    FILIPE Moncada          Document generated by:  FILIPE Moncada

## (undated) NOTE — LETTER
4/4/2024          To Whom It May Concern:    Isis Gramajo is currently under my medical care and may not return to work at this time.    Please excuse Isis for 4 days.  She may return to work on Monday 4/8/2024.  Activity is restricted as follows: none.    If you require additional information please contact our office.        Sincerely,    Jose Kelsey MD          Document generated by:  Jose Kelsey MD